# Patient Record
Sex: FEMALE | URBAN - METROPOLITAN AREA
[De-identification: names, ages, dates, MRNs, and addresses within clinical notes are randomized per-mention and may not be internally consistent; named-entity substitution may affect disease eponyms.]

---

## 2022-05-10 ENCOUNTER — APPOINTMENT (OUTPATIENT)
Dept: GENERAL RADIOLOGY | Age: 62
End: 2022-05-10
Attending: EMERGENCY MEDICINE
Payer: MEDICARE

## 2022-05-10 ENCOUNTER — HOSPITAL ENCOUNTER (EMERGENCY)
Age: 62
Discharge: HOME | End: 2022-05-11
Attending: EMERGENCY MEDICINE
Payer: MEDICARE

## 2022-05-10 DIAGNOSIS — M25.562 CHRONIC PAIN OF BOTH KNEES: Primary | ICD-10-CM

## 2022-05-10 DIAGNOSIS — D64.9 ANEMIA, UNSPECIFIED TYPE: ICD-10-CM

## 2022-05-10 DIAGNOSIS — M25.561 CHRONIC PAIN OF BOTH KNEES: Primary | ICD-10-CM

## 2022-05-10 DIAGNOSIS — G89.29 CHRONIC PAIN OF BOTH KNEES: Primary | ICD-10-CM

## 2022-05-10 LAB
ALBUMIN SERPL-MCNC: 3 G/DL (ref 3.5–5)
ALBUMIN/GLOB SERPL: 0.6 {RATIO} (ref 1.1–2.2)
ALP SERPL-CCNC: 77 U/L (ref 45–117)
ALT SERPL-CCNC: ABNORMAL U/L (ref 12–78)
ANION GAP SERPL CALC-SCNC: 2 MMOL/L (ref 5–15)
ANION GAP SERPL CALC-SCNC: 8 MMOL/L (ref 5–15)
AST SERPL-CCNC: ABNORMAL U/L (ref 15–37)
BASOPHILS # BLD: 0.1 K/UL (ref 0–0.1)
BASOPHILS NFR BLD: 1 % (ref 0–1)
BILIRUB SERPL-MCNC: 0.5 MG/DL (ref 0.2–1)
BUN SERPL-MCNC: 13 MG/DL (ref 6–20)
BUN SERPL-MCNC: 14 MG/DL (ref 6–20)
BUN/CREAT SERPL: 17 (ref 12–20)
BUN/CREAT SERPL: 19 (ref 12–20)
CALCIUM SERPL-MCNC: 8.3 MG/DL (ref 8.5–10.1)
CALCIUM SERPL-MCNC: 9 MG/DL (ref 8.5–10.1)
CHLORIDE SERPL-SCNC: 103 MMOL/L (ref 97–108)
CHLORIDE SERPL-SCNC: 105 MMOL/L (ref 97–108)
CO2 SERPL-SCNC: 23 MMOL/L (ref 21–32)
CO2 SERPL-SCNC: 24 MMOL/L (ref 21–32)
CREAT SERPL-MCNC: 0.75 MG/DL (ref 0.55–1.02)
CREAT SERPL-MCNC: 0.78 MG/DL (ref 0.55–1.02)
DIFFERENTIAL METHOD BLD: ABNORMAL
EOSINOPHIL # BLD: 0 K/UL (ref 0–0.4)
EOSINOPHIL NFR BLD: 0 % (ref 0–7)
ERYTHROCYTE [DISTWIDTH] IN BLOOD BY AUTOMATED COUNT: 16.4 % (ref 11.5–14.5)
GLOBULIN SER CALC-MCNC: 5 G/DL (ref 2–4)
GLUCOSE SERPL-MCNC: 79 MG/DL (ref 65–100)
GLUCOSE SERPL-MCNC: 88 MG/DL (ref 65–100)
HCT VFR BLD AUTO: 33.7 % (ref 35–47)
HGB BLD-MCNC: 10.3 G/DL (ref 11.5–16)
IMM GRANULOCYTES # BLD AUTO: 0.1 K/UL (ref 0–0.04)
IMM GRANULOCYTES NFR BLD AUTO: 1 % (ref 0–0.5)
LYMPHOCYTES # BLD: 2.1 K/UL (ref 0.8–3.5)
LYMPHOCYTES NFR BLD: 13 % (ref 12–49)
MCH RBC QN AUTO: 27.8 PG (ref 26–34)
MCHC RBC AUTO-ENTMCNC: 30.6 G/DL (ref 30–36.5)
MCV RBC AUTO: 90.8 FL (ref 80–99)
MONOCYTES # BLD: 1.2 K/UL (ref 0–1)
MONOCYTES NFR BLD: 7 % (ref 5–13)
NEUTS SEG # BLD: 12.7 K/UL (ref 1.8–8)
NEUTS SEG NFR BLD: 78 % (ref 32–75)
NRBC # BLD: 0 K/UL (ref 0–0.01)
NRBC BLD-RTO: 0 PER 100 WBC
PLATELET # BLD AUTO: 582 K/UL (ref 150–400)
PMV BLD AUTO: 9.1 FL (ref 8.9–12.9)
POTASSIUM SERPL-SCNC: 3.4 MMOL/L (ref 3.5–5.1)
POTASSIUM SERPL-SCNC: ABNORMAL MMOL/L (ref 3.5–5.1)
PROT SERPL-MCNC: 8 G/DL (ref 6.4–8.2)
RBC # BLD AUTO: 3.71 M/UL (ref 3.8–5.2)
SODIUM SERPL-SCNC: 129 MMOL/L (ref 136–145)
SODIUM SERPL-SCNC: 136 MMOL/L (ref 136–145)
UR CULT HOLD, URHOLD: NORMAL
WBC # BLD AUTO: 16.2 K/UL (ref 3.6–11)

## 2022-05-10 PROCEDURE — 99284 EMERGENCY DEPT VISIT MOD MDM: CPT

## 2022-05-10 PROCEDURE — 80053 COMPREHEN METABOLIC PANEL: CPT

## 2022-05-10 PROCEDURE — 74011250636 HC RX REV CODE- 250/636: Performed by: EMERGENCY MEDICINE

## 2022-05-10 PROCEDURE — 96374 THER/PROPH/DIAG INJ IV PUSH: CPT

## 2022-05-10 PROCEDURE — 73562 X-RAY EXAM OF KNEE 3: CPT

## 2022-05-10 PROCEDURE — 85025 COMPLETE CBC W/AUTO DIFF WBC: CPT

## 2022-05-10 PROCEDURE — 96372 THER/PROPH/DIAG INJ SC/IM: CPT

## 2022-05-10 PROCEDURE — 36415 COLL VENOUS BLD VENIPUNCTURE: CPT

## 2022-05-10 PROCEDURE — 81001 URINALYSIS AUTO W/SCOPE: CPT

## 2022-05-10 RX ORDER — KETOROLAC TROMETHAMINE 30 MG/ML
30 INJECTION, SOLUTION INTRAMUSCULAR; INTRAVENOUS ONCE
Status: COMPLETED | OUTPATIENT
Start: 2022-05-10 | End: 2022-05-10

## 2022-05-10 RX ORDER — ONDANSETRON 2 MG/ML
4 INJECTION INTRAMUSCULAR; INTRAVENOUS ONCE
Status: COMPLETED | OUTPATIENT
Start: 2022-05-10 | End: 2022-05-10

## 2022-05-10 RX ADMIN — KETOROLAC TROMETHAMINE 30 MG: 30 INJECTION, SOLUTION INTRAMUSCULAR at 23:03

## 2022-05-10 RX ADMIN — SODIUM CHLORIDE 1000 ML: 9 INJECTION, SOLUTION INTRAVENOUS at 23:36

## 2022-05-10 RX ADMIN — ONDANSETRON HYDROCHLORIDE 4 MG: 2 SOLUTION INTRAMUSCULAR; INTRAVENOUS at 23:36

## 2022-05-10 NOTE — ED TRIAGE NOTES
Triage: Pt arrives from home reporting bilateral knee dislocation, vomiting and need a blood transfusion that she is schedule to get on Monday. She cannot tell me the onset of her symptoms.

## 2022-05-11 VITALS
SYSTOLIC BLOOD PRESSURE: 118 MMHG | RESPIRATION RATE: 16 BRPM | WEIGHT: 132 LBS | HEIGHT: 63 IN | OXYGEN SATURATION: 98 % | TEMPERATURE: 98.4 F | BODY MASS INDEX: 23.39 KG/M2 | HEART RATE: 86 BPM | DIASTOLIC BLOOD PRESSURE: 74 MMHG

## 2022-05-11 LAB
APPEARANCE UR: CLEAR
BACTERIA URNS QL MICRO: NEGATIVE /HPF
BILIRUB UR QL: NEGATIVE
COLOR UR: ABNORMAL
EPITH CASTS URNS QL MICRO: ABNORMAL /LPF
GLUCOSE UR STRIP.AUTO-MCNC: NEGATIVE MG/DL
HGB UR QL STRIP: NEGATIVE
HYALINE CASTS URNS QL MICRO: ABNORMAL /LPF (ref 0–5)
KETONES UR QL STRIP.AUTO: ABNORMAL MG/DL
LEUKOCYTE ESTERASE UR QL STRIP.AUTO: ABNORMAL
NITRITE UR QL STRIP.AUTO: NEGATIVE
PH UR STRIP: 6 [PH] (ref 5–8)
PROT UR STRIP-MCNC: NEGATIVE MG/DL
RBC #/AREA URNS HPF: ABNORMAL /HPF (ref 0–5)
SP GR UR REFRACTOMETRY: >1.03
UROBILINOGEN UR QL STRIP.AUTO: 0.2 EU/DL (ref 0.2–1)
WBC URNS QL MICRO: ABNORMAL /HPF (ref 0–4)

## 2022-05-11 NOTE — ED NOTES
Bedside and Verbal shift change report given to Ruel Schulte RN (oncoming nurse) by Lyn Steward RN (offgoing nurse). Report included the following information SBAR, Kardex, ED Summary, Intake/Output, MAR, Recent Results and Med Rec Status.

## 2022-05-11 NOTE — ED PROVIDER NOTES
27-year-old female with past medical history significant for chronic bilateral knee pain, anemia presents with complaints of bilateral knee pain and concern for anemia with increasing fatigue. Patient reports she was unable to get her iron infusion at her last appointment at the infusion center because they could not get IV access. Denies syncope. Denies any new new trauma. Patient reports she is scheduled for a bilateral knee replacement with her orthopedist in [place]. Denies recent illness, fever, chills, nausea, vomiting, chest pain, shortness of breath. Friend concerned about her energy level. Denies urinary complaints. Denies tobacco use, drug use, alcohol use           No past medical history on file. No past surgical history on file. No family history on file. Social History     Socioeconomic History    Marital status: UNKNOWN     Spouse name: Not on file    Number of children: Not on file    Years of education: Not on file    Highest education level: Not on file   Occupational History    Not on file   Tobacco Use    Smoking status: Not on file    Smokeless tobacco: Not on file   Substance and Sexual Activity    Alcohol use: Not on file    Drug use: Not on file    Sexual activity: Not on file   Other Topics Concern    Not on file   Social History Narrative    Not on file     Social Determinants of Health     Financial Resource Strain:     Difficulty of Paying Living Expenses: Not on file   Food Insecurity:     Worried About Running Out of Food in the Last Year: Not on file    Se of Food in the Last Year: Not on file   Transportation Needs:     Lack of Transportation (Medical): Not on file    Lack of Transportation (Non-Medical):  Not on file   Physical Activity:     Days of Exercise per Week: Not on file    Minutes of Exercise per Session: Not on file   Stress:     Feeling of Stress : Not on file   Social Connections:     Frequency of Communication with Friends and Family: Not on file    Frequency of Social Gatherings with Friends and Family: Not on file    Attends Latter-day Services: Not on file    Active Member of Clubs or Organizations: Not on file    Attends Club or Organization Meetings: Not on file    Marital Status: Not on file   Intimate Partner Violence:     Fear of Current or Ex-Partner: Not on file    Emotionally Abused: Not on file    Physically Abused: Not on file    Sexually Abused: Not on file   Housing Stability:     Unable to Pay for Housing in the Last Year: Not on file    Number of [place] in the Last Year: Not on file    Unstable Housing in the Last Year: Not on file         ALLERGIES: Haldol [haloperidol lactate], Risperidone, and Clarithromycin    Review of Systems   Constitutional: Positive for fatigue. Negative for chills and fever. Respiratory: Negative for cough and shortness of breath. Cardiovascular: Negative for chest pain. Gastrointestinal: Negative for abdominal pain, nausea and vomiting. Genitourinary: Negative for dysuria and urgency. Musculoskeletal: Positive for arthralgias. Skin: Negative for wound. Neurological: Negative for seizures and headaches. Vitals:    05/10/22 1650   BP: 118/72   Pulse: 90   Resp: 16   Temp: 98 °F (36.7 °C)   SpO2: 98%            Physical Exam  Constitutional:       Appearance: She is well-developed. HENT:      Head: Normocephalic and atraumatic. Eyes:      Comments: Pale conjunctiva   Cardiovascular:      Rate and Rhythm: Normal rate. Pulmonary:      Effort: Pulmonary effort is normal. No respiratory distress. Musculoskeletal:         General: Normal range of motion. Cervical back: Normal range of motion. Comments: Ambulatory emergency room without difficulty, no bony step-offs or crepitus. Skin:     General: Skin is warm and dry. Neurological:      Mental Status: She is alert and oriented to person, place, and time.           [facility]  Number of Diagnoses or Management Options  Anemia, unspecified type  Chronic pain of both knees  Diagnosis management comments: 81-year-old female with chronic bilateral knee pain/arthritis, chronic anemia presents with complaints of bilateral knee pain and fatigue with anemia. Patient is afebrile, nontoxic, hemodynamically stable, afebrile, no respiratory stress, clear to auscultation bilaterally, normal oxygen saturation, abdomen soft/nontender/nondistended, ambulatory movement without difficulty. Plan bilateral knee x-ray, CBC/CMP. Knee x-ray showed no acute abnormality degenerative disease  Hemoglobin stable at 10.3 elevated WBC was noted at 16.2. Patient denies any recent illness, fever, chills, urinary complaints, cough, URI complaints. Advised to follow-up with primary care physician. Amount and/or Complexity of Data Reviewed  Clinical lab tests: ordered and reviewed  Tests in the radiology section of CPT®: ordered and reviewed    Patient Progress  Patient progress: stable         Procedures  11:55 PM  Patient's results have been reviewed with them. Patient and/or family have verbally conveyed their understanding and agreement of the patient's signs, symptoms, diagnosis, treatment and prognosis and additionally agree to follow up as recommended or return to the Emergency Room should their condition change prior to follow-up. Discharge instructions have also been provided to the patient with some educational information regarding their diagnosis as well a list of reasons why they would want to return to the ER prior to their follow-up appointment should their condition change. \

## 2022-05-16 ENCOUNTER — APPOINTMENT (OUTPATIENT)
Dept: GENERAL RADIOLOGY | Age: 62
DRG: 368 | End: 2022-05-16
Attending: EMERGENCY MEDICINE
Payer: MEDICARE

## 2022-05-16 ENCOUNTER — OFFICE VISIT (OUTPATIENT)
Dept: ONCOLOGY | Age: 62
End: 2022-05-16
Payer: MEDICARE

## 2022-05-16 ENCOUNTER — HOSPITAL ENCOUNTER (INPATIENT)
Age: 62
LOS: 12 days | Discharge: NURSING, REHAB OR LONG-TERM CARE | DRG: 368 | End: 2022-05-28
Attending: EMERGENCY MEDICINE | Admitting: HOSPITALIST
Payer: MEDICARE

## 2022-05-16 ENCOUNTER — APPOINTMENT (OUTPATIENT)
Dept: CT IMAGING | Age: 62
DRG: 368 | End: 2022-05-16
Attending: EMERGENCY MEDICINE
Payer: MEDICARE

## 2022-05-16 VITALS
HEART RATE: 83 BPM | DIASTOLIC BLOOD PRESSURE: 64 MMHG | HEIGHT: 63 IN | RESPIRATION RATE: 16 BRPM | BODY MASS INDEX: 21.76 KG/M2 | OXYGEN SATURATION: 97 % | SYSTOLIC BLOOD PRESSURE: 105 MMHG | TEMPERATURE: 98.1 F | WEIGHT: 122.8 LBS

## 2022-05-16 DIAGNOSIS — M25.562 CHRONIC PAIN OF BOTH KNEES: Primary | ICD-10-CM

## 2022-05-16 DIAGNOSIS — I95.89 HYPOTENSION DUE TO HYPOVOLEMIA: ICD-10-CM

## 2022-05-16 DIAGNOSIS — D50.0 IRON DEFICIENCY ANEMIA DUE TO CHRONIC BLOOD LOSS: Primary | ICD-10-CM

## 2022-05-16 DIAGNOSIS — M25.561 CHRONIC PAIN OF BOTH KNEES: Primary | ICD-10-CM

## 2022-05-16 DIAGNOSIS — Z51.5 PALLIATIVE CARE BY SPECIALIST: ICD-10-CM

## 2022-05-16 DIAGNOSIS — F19.90 SUBSTANCE USE DISORDER: ICD-10-CM

## 2022-05-16 DIAGNOSIS — G89.29 CHRONIC PAIN OF BOTH KNEES: Primary | ICD-10-CM

## 2022-05-16 DIAGNOSIS — K92.2 UPPER GI BLEED: ICD-10-CM

## 2022-05-16 DIAGNOSIS — E86.1 HYPOTENSION DUE TO HYPOVOLEMIA: ICD-10-CM

## 2022-05-16 PROBLEM — D50.9 IRON DEFICIENCY ANEMIA: Status: ACTIVE | Noted: 2022-05-16

## 2022-05-16 LAB
ALBUMIN SERPL-MCNC: 3 G/DL (ref 3.5–5)
ALBUMIN/GLOB SERPL: 0.7 {RATIO} (ref 1.1–2.2)
ALP SERPL-CCNC: 77 U/L (ref 45–117)
ALT SERPL-CCNC: 18 U/L (ref 12–78)
ANION GAP SERPL CALC-SCNC: 10 MMOL/L (ref 5–15)
APPEARANCE UR: CLEAR
AST SERPL-CCNC: 16 U/L (ref 15–37)
BACTERIA URNS QL MICRO: NEGATIVE /HPF
BASE DEFICIT BLD-SCNC: 2.7 MMOL/L
BASOPHILS # BLD: 0 K/UL (ref 0–0.1)
BASOPHILS NFR BLD: 0 % (ref 0–1)
BILIRUB SERPL-MCNC: 0.2 MG/DL (ref 0.2–1)
BILIRUB UR QL: NEGATIVE
BNP SERPL-MCNC: 111 PG/ML
BUN SERPL-MCNC: 19 MG/DL (ref 6–20)
BUN/CREAT SERPL: 20 (ref 12–20)
CA-I BLD-MCNC: 1.15 MMOL/L (ref 1.12–1.32)
CALCIUM SERPL-MCNC: 9.1 MG/DL (ref 8.5–10.1)
CHLORIDE BLD-SCNC: 100 MMOL/L (ref 100–108)
CHLORIDE SERPL-SCNC: 101 MMOL/L (ref 97–108)
CO2 BLD-SCNC: 21 MMOL/L (ref 19–24)
CO2 SERPL-SCNC: 22 MMOL/L (ref 21–32)
COLOR UR: ABNORMAL
CREAT SERPL-MCNC: 0.96 MG/DL (ref 0.55–1.02)
CREAT UR-MCNC: 0.8 MG/DL (ref 0.6–1.3)
DIFFERENTIAL METHOD BLD: ABNORMAL
EOSINOPHIL # BLD: 0.1 K/UL (ref 0–0.4)
EOSINOPHIL NFR BLD: 1 % (ref 0–7)
EPITH CASTS URNS QL MICRO: ABNORMAL /LPF
ERYTHROCYTE [DISTWIDTH] IN BLOOD BY AUTOMATED COUNT: 16.1 % (ref 11.5–14.5)
ERYTHROCYTE [DISTWIDTH] IN BLOOD BY AUTOMATED COUNT: 16.2 % (ref 11.5–14.5)
GLOBULIN SER CALC-MCNC: 4.2 G/DL (ref 2–4)
GLUCOSE BLD STRIP.AUTO-MCNC: 80 MG/DL (ref 74–106)
GLUCOSE SERPL-MCNC: 101 MG/DL (ref 65–100)
GLUCOSE UR STRIP.AUTO-MCNC: NEGATIVE MG/DL
HCO3 BLDA-SCNC: 20 MMOL/L
HCT VFR BLD AUTO: 21.6 % (ref 35–47)
HCT VFR BLD AUTO: 27.1 % (ref 35–47)
HGB BLD-MCNC: 6.5 G/DL (ref 11.5–16)
HGB BLD-MCNC: 8.3 G/DL (ref 11.5–16)
HGB UR QL STRIP: NEGATIVE
IMM GRANULOCYTES # BLD AUTO: 0.1 K/UL (ref 0–0.04)
IMM GRANULOCYTES NFR BLD AUTO: 1 % (ref 0–0.5)
KETONES UR QL STRIP.AUTO: NEGATIVE MG/DL
LACTATE BLD-SCNC: 1.1 MMOL/L (ref 0.4–2)
LEUKOCYTE ESTERASE UR QL STRIP.AUTO: ABNORMAL
LIPASE SERPL-CCNC: 109 U/L (ref 73–393)
LYMPHOCYTES # BLD: 2.4 K/UL (ref 0.8–3.5)
LYMPHOCYTES NFR BLD: 19 % (ref 12–49)
MCH RBC QN AUTO: 27.2 PG (ref 26–34)
MCH RBC QN AUTO: 27.3 PG (ref 26–34)
MCHC RBC AUTO-ENTMCNC: 30.1 G/DL (ref 30–36.5)
MCHC RBC AUTO-ENTMCNC: 30.6 G/DL (ref 30–36.5)
MCV RBC AUTO: 89.1 FL (ref 80–99)
MCV RBC AUTO: 90.4 FL (ref 80–99)
MONOCYTES # BLD: 0.9 K/UL (ref 0–1)
MONOCYTES NFR BLD: 7 % (ref 5–13)
NEUTS SEG # BLD: 9.1 K/UL (ref 1.8–8)
NEUTS SEG NFR BLD: 72 % (ref 32–75)
NITRITE UR QL STRIP.AUTO: NEGATIVE
NRBC # BLD: 0 K/UL (ref 0–0.01)
NRBC # BLD: 0 K/UL (ref 0–0.01)
NRBC BLD-RTO: 0 PER 100 WBC
NRBC BLD-RTO: 0 PER 100 WBC
OTHER,OTHU: ABNORMAL
PCO2 BLDV: 28 MMHG (ref 41–51)
PH BLDV: 7.47 [PH] (ref 7.32–7.42)
PH UR STRIP: 5 [PH] (ref 5–8)
PLATELET # BLD AUTO: 506 K/UL (ref 150–400)
PLATELET # BLD AUTO: 734 K/UL (ref 150–400)
PMV BLD AUTO: 8.3 FL (ref 8.9–12.9)
PMV BLD AUTO: 8.5 FL (ref 8.9–12.9)
PO2 BLDV: 35 MMHG (ref 25–40)
POTASSIUM BLD-SCNC: 3.3 MMOL/L (ref 3.5–5.5)
POTASSIUM SERPL-SCNC: 3.3 MMOL/L (ref 3.5–5.1)
PROT SERPL-MCNC: 7.2 G/DL (ref 6.4–8.2)
PROT UR STRIP-MCNC: NEGATIVE MG/DL
RBC # BLD AUTO: 2.39 M/UL (ref 3.8–5.2)
RBC # BLD AUTO: 3.04 M/UL (ref 3.8–5.2)
RBC #/AREA URNS HPF: ABNORMAL /HPF (ref 0–5)
RBC MORPH BLD: ABNORMAL
SODIUM BLD-SCNC: 136 MMOL/L (ref 136–145)
SODIUM SERPL-SCNC: 133 MMOL/L (ref 136–145)
SPECIMEN SITE: ABNORMAL
TROPONIN-HIGH SENSITIVITY: 8 NG/L (ref 0–51)
UA: UC IF INDICATED,UAUC: ABNORMAL
UROBILINOGEN UR QL STRIP.AUTO: 0.2 EU/DL (ref 0.2–1)
WBC # BLD AUTO: 12.6 K/UL (ref 3.6–11)
WBC # BLD AUTO: 8 K/UL (ref 3.6–11)
WBC URNS QL MICRO: ABNORMAL /HPF (ref 0–4)

## 2022-05-16 PROCEDURE — 99203 OFFICE O/P NEW LOW 30 MIN: CPT | Performed by: INTERNAL MEDICINE

## 2022-05-16 PROCEDURE — 74011000250 HC RX REV CODE- 250: Performed by: EMERGENCY MEDICINE

## 2022-05-16 PROCEDURE — 80053 COMPREHEN METABOLIC PANEL: CPT

## 2022-05-16 PROCEDURE — 71045 X-RAY EXAM CHEST 1 VIEW: CPT

## 2022-05-16 PROCEDURE — 93005 ELECTROCARDIOGRAM TRACING: CPT

## 2022-05-16 PROCEDURE — 83690 ASSAY OF LIPASE: CPT

## 2022-05-16 PROCEDURE — G8432 DEP SCR NOT DOC, RNG: HCPCS | Performed by: INTERNAL MEDICINE

## 2022-05-16 PROCEDURE — 84484 ASSAY OF TROPONIN QUANT: CPT

## 2022-05-16 PROCEDURE — 83880 ASSAY OF NATRIURETIC PEPTIDE: CPT

## 2022-05-16 PROCEDURE — 96375 TX/PRO/DX INJ NEW DRUG ADDON: CPT

## 2022-05-16 PROCEDURE — 74011250636 HC RX REV CODE- 250/636: Performed by: EMERGENCY MEDICINE

## 2022-05-16 PROCEDURE — 94761 N-INVAS EAR/PLS OXIMETRY MLT: CPT

## 2022-05-16 PROCEDURE — 81001 URINALYSIS AUTO W/SCOPE: CPT

## 2022-05-16 PROCEDURE — 82947 ASSAY GLUCOSE BLOOD QUANT: CPT

## 2022-05-16 PROCEDURE — 82533 TOTAL CORTISOL: CPT

## 2022-05-16 PROCEDURE — 3017F COLORECTAL CA SCREEN DOC REV: CPT | Performed by: INTERNAL MEDICINE

## 2022-05-16 PROCEDURE — 84443 ASSAY THYROID STIM HORMONE: CPT

## 2022-05-16 PROCEDURE — 86900 BLOOD TYPING SEROLOGIC ABO: CPT

## 2022-05-16 PROCEDURE — 96361 HYDRATE IV INFUSION ADD-ON: CPT

## 2022-05-16 PROCEDURE — 36415 COLL VENOUS BLD VENIPUNCTURE: CPT

## 2022-05-16 PROCEDURE — 85025 COMPLETE CBC W/AUTO DIFF WBC: CPT

## 2022-05-16 PROCEDURE — 70450 CT HEAD/BRAIN W/O DYE: CPT

## 2022-05-16 PROCEDURE — 74011250636 HC RX REV CODE- 250/636: Performed by: NURSE PRACTITIONER

## 2022-05-16 PROCEDURE — 74011250637 HC RX REV CODE- 250/637: Performed by: EMERGENCY MEDICINE

## 2022-05-16 PROCEDURE — 99285 EMERGENCY DEPT VISIT HI MDM: CPT

## 2022-05-16 PROCEDURE — C9113 INJ PANTOPRAZOLE SODIUM, VIA: HCPCS | Performed by: EMERGENCY MEDICINE

## 2022-05-16 PROCEDURE — 65610000006 HC RM INTENSIVE CARE

## 2022-05-16 PROCEDURE — G8427 DOCREV CUR MEDS BY ELIG CLIN: HCPCS | Performed by: INTERNAL MEDICINE

## 2022-05-16 PROCEDURE — 84145 PROCALCITONIN (PCT): CPT

## 2022-05-16 PROCEDURE — G0463 HOSPITAL OUTPT CLINIC VISIT: HCPCS | Performed by: INTERNAL MEDICINE

## 2022-05-16 PROCEDURE — G8420 CALC BMI NORM PARAMETERS: HCPCS | Performed by: INTERNAL MEDICINE

## 2022-05-16 PROCEDURE — 96374 THER/PROPH/DIAG INJ IV PUSH: CPT

## 2022-05-16 PROCEDURE — 85027 COMPLETE CBC AUTOMATED: CPT

## 2022-05-16 RX ORDER — SODIUM CHLORIDE 0.9 % (FLUSH) 0.9 %
5-40 SYRINGE (ML) INJECTION EVERY 8 HOURS
Status: DISCONTINUED | OUTPATIENT
Start: 2022-05-16 | End: 2022-05-28 | Stop reason: HOSPADM

## 2022-05-16 RX ORDER — ACETAMINOPHEN 500 MG
1000 TABLET ORAL
Status: DISCONTINUED | OUTPATIENT
Start: 2022-05-16 | End: 2022-05-18

## 2022-05-16 RX ORDER — BUDESONIDE 0.25 MG/2ML
250 INHALANT ORAL
Status: DISCONTINUED | OUTPATIENT
Start: 2022-05-17 | End: 2022-05-28 | Stop reason: HOSPADM

## 2022-05-16 RX ORDER — SODIUM CHLORIDE, SODIUM LACTATE, POTASSIUM CHLORIDE, CALCIUM CHLORIDE 600; 310; 30; 20 MG/100ML; MG/100ML; MG/100ML; MG/100ML
100 INJECTION, SOLUTION INTRAVENOUS CONTINUOUS
Status: DISPENSED | OUTPATIENT
Start: 2022-05-16 | End: 2022-05-17

## 2022-05-16 RX ORDER — SODIUM CHLORIDE 0.9 % (FLUSH) 0.9 %
5-40 SYRINGE (ML) INJECTION AS NEEDED
Status: DISCONTINUED | OUTPATIENT
Start: 2022-05-16 | End: 2022-05-28 | Stop reason: HOSPADM

## 2022-05-16 RX ORDER — SODIUM CHLORIDE 9 MG/ML
250 INJECTION, SOLUTION INTRAVENOUS AS NEEDED
Status: DISCONTINUED | OUTPATIENT
Start: 2022-05-16 | End: 2022-05-28 | Stop reason: HOSPADM

## 2022-05-16 RX ORDER — ONDANSETRON 2 MG/ML
4 INJECTION INTRAMUSCULAR; INTRAVENOUS
Status: DISCONTINUED | OUTPATIENT
Start: 2022-05-16 | End: 2022-05-28 | Stop reason: HOSPADM

## 2022-05-16 RX ORDER — KETOROLAC TROMETHAMINE 30 MG/ML
15 INJECTION, SOLUTION INTRAMUSCULAR; INTRAVENOUS
Status: COMPLETED | OUTPATIENT
Start: 2022-05-16 | End: 2022-05-16

## 2022-05-16 RX ORDER — LEVOTHYROXINE SODIUM 150 UG/1
150 TABLET ORAL
Status: DISCONTINUED | OUTPATIENT
Start: 2022-05-17 | End: 2022-05-18

## 2022-05-16 RX ORDER — LORAZEPAM 0.5 MG/1
0.5 TABLET ORAL
Status: COMPLETED | OUTPATIENT
Start: 2022-05-16 | End: 2022-05-16

## 2022-05-16 RX ORDER — IPRATROPIUM BROMIDE AND ALBUTEROL SULFATE 2.5; .5 MG/3ML; MG/3ML
3 SOLUTION RESPIRATORY (INHALATION)
Status: DISCONTINUED | OUTPATIENT
Start: 2022-05-17 | End: 2022-05-18

## 2022-05-16 RX ORDER — ONDANSETRON 2 MG/ML
4 INJECTION INTRAMUSCULAR; INTRAVENOUS
Status: COMPLETED | OUTPATIENT
Start: 2022-05-16 | End: 2022-05-16

## 2022-05-16 RX ORDER — POTASSIUM CHLORIDE 7.45 MG/ML
10 INJECTION INTRAVENOUS
Status: DISPENSED | OUTPATIENT
Start: 2022-05-16 | End: 2022-05-17

## 2022-05-16 RX ORDER — ONDANSETRON 4 MG/1
4 TABLET, ORALLY DISINTEGRATING ORAL
Status: DISCONTINUED | OUTPATIENT
Start: 2022-05-16 | End: 2022-05-28 | Stop reason: HOSPADM

## 2022-05-16 RX ADMIN — SODIUM CHLORIDE 1000 ML: 9 INJECTION, SOLUTION INTRAVENOUS at 19:50

## 2022-05-16 RX ADMIN — SODIUM CHLORIDE 1000 ML: 9 INJECTION, SOLUTION INTRAVENOUS at 19:08

## 2022-05-16 RX ADMIN — LORAZEPAM 0.5 MG: 0.5 TABLET ORAL at 22:08

## 2022-05-16 RX ADMIN — SODIUM CHLORIDE 80 MG: 9 INJECTION, SOLUTION INTRAMUSCULAR; INTRAVENOUS; SUBCUTANEOUS at 19:00

## 2022-05-16 RX ADMIN — POTASSIUM CHLORIDE 10 MEQ: 7.46 INJECTION, SOLUTION INTRAVENOUS at 23:43

## 2022-05-16 RX ADMIN — KETOROLAC TROMETHAMINE 15 MG: 30 INJECTION, SOLUTION INTRAMUSCULAR at 18:58

## 2022-05-16 RX ADMIN — KETOROLAC TROMETHAMINE 15 MG: 30 INJECTION, SOLUTION INTRAMUSCULAR at 18:59

## 2022-05-16 RX ADMIN — SODIUM CHLORIDE, POTASSIUM CHLORIDE, SODIUM LACTATE AND CALCIUM CHLORIDE 100 ML/HR: 600; 310; 30; 20 INJECTION, SOLUTION INTRAVENOUS at 23:42

## 2022-05-16 RX ADMIN — ONDANSETRON 4 MG: 2 INJECTION INTRAMUSCULAR; INTRAVENOUS at 18:59

## 2022-05-16 NOTE — PROGRESS NOTES
[address] [facility] [address], [address] [facility] [place], [address] [place] [address]  [phone]    Hematology Note        Patient: Pamela Hancock MRN: [id number]  SSN: [id number]    YOB: 1960  Age: 62 y.o. Sex: female      Subjective:      Pamela Hancock is a 62 y.o. female who I am seeing for iron deficiency anemia. She has suffered with iron deficiency anemia for several yrs. She receives IV iron in [place] where she lives. She is visiting family members in [place]. She feels poorly and is laying on the exam table. She is poor historian. Review of Systems:    Constitutional: positive for fatigue  Eyes: negative  Ears, Nose, Mouth, Throat, and Face: negative  Respiratory: negative  Cardiovascular: negative  Gastrointestinal: negative  Genitourinary:negative  Integument/Breast: negative  Hematologic/Lymphatic: negative  Musculoskeletal:negative  Neurological: negative      Past Medical History:   Diagnosis Date    Arthritis     Iron deficiency     Lupus ([address]     Osteoporosis     Sarcoidosis      Past Surgical History:   Procedure Laterality Date    HX HERNIA REPAIR  2013    needs a repat she says      History reviewed. No pertinent family history.   Social History     Tobacco Use    Smoking status: Former Smoker     Types: Cigarettes    Smokeless tobacco: Never Used   Substance Use Topics    Alcohol use: Never      Prior to Admission medications    Not on File            Allergies   Allergen Reactions    Haldol [Haloperidol Lactate] Anaphylaxis    Risperidone Anaphylaxis    Clarithromycin Other (comments)     Urticaria           Objective:     Vitals:    05/16/22 1544   BP: 105/64   Pulse: 83   Resp: 16   Temp: 98.1 °F (36.7 °C)   TempSrc: Oral   SpO2: 97%   Weight: 122 lb 12.8 oz (55.7 kg)   Height: 5' 3\" (1.6 m)            Physical Exam:    GENERAL: no distress, mild distress, appears older than stated age, she is poorly co-operative with exam  EYE: Could not examine since the patient is wearing dark glasses  LYMPHATIC: Cervical, supraclavicular, and axillary nodes normal.   THROAT & NECK: normal and no erythema or exudates noted. LUNG: clear to auscultation bilaterally  HEART: regular rate and rhythm, S1, S2 normal, no murmur, click, rub or gallop  ABDOMEN: soft, non-tender. Bowel sounds normal. No masses,  no organomegaly  EXTREMITIES: no edema  SKIN: Normal.  NEUROLOGIC: Alert. Detailed neurological exam was not performed        Lab Results   Component Value Date/Time    WBC 12.6 (H) 05/16/2022 04:57 PM    HGB 8.3 (L) 05/16/2022 04:57 PM    HCT 27.1 (L) 05/16/2022 04:57 PM    PLATELET 090 (H) 16/70/0025 04:57 PM    MCV 89.1 05/16/2022 04:57 PM           No results found for: IRON, FE, TIBC, IBCT, PSAT, FERR        Assessment:     1. Iron deficiency anemia secondary to chronic gastrointestinal bleeding:    Last Hg ~ 10  Patient is from [place]  No urgent need for IV iron      Plan:       1.  Follow up with your hematologist in [place]      Signed by: Elida Luciano MD                     May 16, 2022

## 2022-05-16 NOTE — PROGRESS NOTES
63 y/o female here for new appt for abnormal labs presents in a wheelchair. High platelets, high WBC, and low HGB. This is noted on her ER visit recently for knee pain. She was self referred here for JANE, PCP is in [place]. Pt thought she was going to be able to get iron today. She has difficulty swallowing. She has seen GI , Dr. Lena Abdullahi in [place]. Oral pills can not tolerate due to difficulty swallowing due to hernia. She is here on vacation from [place]. She has been here approx. 3-4 weeks. Pt was supposed to get iron transfusion back on April 20 before leaving [place] but could not get a line. Pt has had several iron infusions before. Venofer is the infusions she has had. She is not sure when last one was. Pt is slumped back in the chair, moaning and huffing, has pain, she has sarcoidosis and lupus. Which she usually takes toradol but doesn't have any. She is not supposed to take NSAIDS but she is to help with pain due to no toradol. Pt said she is nauseated and in pain. Last colonoscopy was April 8 but she doesn't remember the ear. Pt unable to remember her medications in extent. Pt is on xarelto but it is prophylatic she says. Pt was throwing up the other day which had blood in it. She is on protonix twice a day. Has not had it since been here in [place]. She is also on carafate but does not have it. She had a bleeding ulcer before down to 5. Last blood transfusion was in 2020. Pt was very flighty during the visit. Her cousin was present during the visit and seemed frustrated with the pt. She asked if she can get up on the table, I advised yes but she will need to get herself on the table if she needs to. She then did not want to leave the office. Tremayne Monreal NP spoke with pt and said if she is feeling so poorly she needs to go to the ED as we can not help her in the outpt setting.

## 2022-05-17 ENCOUNTER — ANESTHESIA (OUTPATIENT)
Dept: ENDOSCOPY | Age: 62
DRG: 368 | End: 2022-05-17
Payer: MEDICARE

## 2022-05-17 ENCOUNTER — ANESTHESIA EVENT (OUTPATIENT)
Dept: ENDOSCOPY | Age: 62
DRG: 368 | End: 2022-05-17
Payer: MEDICARE

## 2022-05-17 ENCOUNTER — APPOINTMENT (OUTPATIENT)
Dept: NON INVASIVE DIAGNOSTICS | Age: 62
DRG: 368 | End: 2022-05-17
Attending: NURSE PRACTITIONER
Payer: MEDICARE

## 2022-05-17 ENCOUNTER — APPOINTMENT (OUTPATIENT)
Dept: GENERAL RADIOLOGY | Age: 62
DRG: 368 | End: 2022-05-17
Attending: PHYSICIAN ASSISTANT
Payer: MEDICARE

## 2022-05-17 PROBLEM — K92.0 HEMATEMESIS: Status: ACTIVE | Noted: 2022-05-17

## 2022-05-17 LAB
ANION GAP SERPL CALC-SCNC: 6 MMOL/L (ref 5–15)
ATRIAL RATE: 59 BPM
BASOPHILS # BLD: 0.1 K/UL (ref 0–0.1)
BASOPHILS NFR BLD: 1 % (ref 0–1)
BUN SERPL-MCNC: 16 MG/DL (ref 6–20)
BUN/CREAT SERPL: 22 (ref 12–20)
CALCIUM SERPL-MCNC: 8 MG/DL (ref 8.5–10.1)
CALCULATED P AXIS, ECG09: 39 DEGREES
CALCULATED R AXIS, ECG10: 41 DEGREES
CALCULATED T AXIS, ECG11: 31 DEGREES
CHLORIDE SERPL-SCNC: 110 MMOL/L (ref 97–108)
CO2 SERPL-SCNC: 21 MMOL/L (ref 21–32)
CORTIS SERPL-MCNC: 3.9 UG/DL
CREAT SERPL-MCNC: 0.74 MG/DL (ref 0.55–1.02)
DIAGNOSIS, 93000: NORMAL
DIFFERENTIAL METHOD BLD: ABNORMAL
ECHO AO ROOT DIAM: 4 CM
ECHO AO ROOT INDEX: 2.53 CM/M2
ECHO AR MAX VEL PISA: 4.5 M/S
ECHO AV AREA PEAK VELOCITY: 2.2 CM2
ECHO AV AREA PEAK VELOCITY: 2.3 CM2
ECHO AV AREA VTI: 2.5 CM2
ECHO AV AREA/BSA VTI: 1.6 CM2/M2
ECHO AV MEAN GRADIENT: 6 MMHG
ECHO AV MEAN VELOCITY: 1.2 M/S
ECHO AV PEAK GRADIENT: 12 MMHG
ECHO AV PEAK GRADIENT: 13 MMHG
ECHO AV PEAK VELOCITY: 1.8 M/S
ECHO AV PEAK VELOCITY: 1.8 M/S
ECHO AV REGURGITANT PHT: 265.9 MILLISECOND
ECHO AV VTI: 33.3 CM
ECHO EST RA PRESSURE: 8 MMHG
ECHO LA DIAMETER INDEX: 2.09 CM/M2
ECHO LA DIAMETER: 3.3 CM
ECHO LA TO AORTIC ROOT RATIO: 0.83
ECHO LV E' LATERAL VELOCITY: 4 CM/S
ECHO LV E' SEPTAL VELOCITY: 6 CM/S
ECHO LV FRACTIONAL SHORTENING: 47 % (ref 28–44)
ECHO LV INTERNAL DIMENSION DIASTOLE INDEX: 2.85 CM/M2
ECHO LV INTERNAL DIMENSION DIASTOLIC: 4.5 CM (ref 3.9–5.3)
ECHO LV INTERNAL DIMENSION SYSTOLIC INDEX: 1.52 CM/M2
ECHO LV INTERNAL DIMENSION SYSTOLIC: 2.4 CM
ECHO LV IVSD: 1.6 CM (ref 0.6–0.9)
ECHO LV MASS 2D: 235.3 G (ref 67–162)
ECHO LV MASS INDEX 2D: 148.9 G/M2 (ref 43–95)
ECHO LV POSTERIOR WALL DIASTOLIC: 1.1 CM (ref 0.6–0.9)
ECHO LV RELATIVE WALL THICKNESS RATIO: 0.49
ECHO LVOT AREA: 3.8 CM2
ECHO LVOT AV VTI INDEX: 0.69
ECHO LVOT DIAM: 2.2 CM
ECHO LVOT MEAN GRADIENT: 3 MMHG
ECHO LVOT PEAK GRADIENT: 5 MMHG
ECHO LVOT PEAK GRADIENT: 5 MMHG
ECHO LVOT PEAK VELOCITY: 1.1 M/S
ECHO LVOT PEAK VELOCITY: 1.1 M/S
ECHO LVOT STROKE VOLUME INDEX: 55.3 ML/M2
ECHO LVOT SV: 87.4 ML
ECHO LVOT VTI: 23 CM
ECHO MV A VELOCITY: 1.14 M/S
ECHO MV E VELOCITY: 0.67 M/S
ECHO MV E/A RATIO: 0.59
ECHO MV E/E' LATERAL: 16.75
ECHO MV E/E' RATIO (AVERAGED): 13.96
ECHO MV E/E' SEPTAL: 11.17
ECHO MV REGURGITANT PEAK GRADIENT: 112 MMHG
ECHO MV REGURGITANT PEAK VELOCITY: 5.3 M/S
ECHO PV MAX VELOCITY: 1.1 M/S
ECHO PV PEAK GRADIENT: 5 MMHG
ECHO RIGHT VENTRICULAR SYSTOLIC PRESSURE (RVSP): 43 MMHG
ECHO RV FREE WALL PEAK S': 28 CM/S
ECHO TV REGURGITANT MAX VELOCITY: 2.94 M/S
ECHO TV REGURGITANT PEAK GRADIENT: 35 MMHG
EOSINOPHIL # BLD: 0.2 K/UL (ref 0–0.4)
EOSINOPHIL NFR BLD: 3 % (ref 0–7)
ERYTHROCYTE [DISTWIDTH] IN BLOOD BY AUTOMATED COUNT: 15.4 % (ref 11.5–14.5)
ERYTHROCYTE [DISTWIDTH] IN BLOOD BY AUTOMATED COUNT: 15.5 % (ref 11.5–14.5)
ERYTHROCYTE [DISTWIDTH] IN BLOOD BY AUTOMATED COUNT: 15.5 % (ref 11.5–14.5)
FERRITIN SERPL-MCNC: 15 NG/ML (ref 8–252)
GLUCOSE SERPL-MCNC: 71 MG/DL (ref 65–100)
HCT VFR BLD AUTO: 24.6 % (ref 35–47)
HCT VFR BLD AUTO: 25.1 % (ref 35–47)
HCT VFR BLD AUTO: 29.6 % (ref 35–47)
HGB BLD-MCNC: 7.4 G/DL (ref 11.5–16)
HGB BLD-MCNC: 7.8 G/DL (ref 11.5–16)
HGB BLD-MCNC: 9.1 G/DL (ref 11.5–16)
HISTORY CHECKED?,CKHIST: NORMAL
IMM GRANULOCYTES # BLD AUTO: 0.1 K/UL (ref 0–0.04)
IMM GRANULOCYTES NFR BLD AUTO: 1 % (ref 0–0.5)
INR PPP: 1 (ref 0.9–1.1)
LACTATE SERPL-SCNC: 1.5 MMOL/L (ref 0.4–2)
LYMPHOCYTES # BLD: 1.9 K/UL (ref 0.8–3.5)
LYMPHOCYTES NFR BLD: 25 % (ref 12–49)
MAGNESIUM SERPL-MCNC: 1.9 MG/DL (ref 1.6–2.4)
MCH RBC QN AUTO: 27.7 PG (ref 26–34)
MCH RBC QN AUTO: 28.2 PG (ref 26–34)
MCH RBC QN AUTO: 28.4 PG (ref 26–34)
MCHC RBC AUTO-ENTMCNC: 30.1 G/DL (ref 30–36.5)
MCHC RBC AUTO-ENTMCNC: 30.7 G/DL (ref 30–36.5)
MCHC RBC AUTO-ENTMCNC: 31.1 G/DL (ref 30–36.5)
MCV RBC AUTO: 90.2 FL (ref 80–99)
MCV RBC AUTO: 91.3 FL (ref 80–99)
MCV RBC AUTO: 93.9 FL (ref 80–99)
MONOCYTES # BLD: 0.6 K/UL (ref 0–1)
MONOCYTES NFR BLD: 8 % (ref 5–13)
NEUTS SEG # BLD: 4.7 K/UL (ref 1.8–8)
NEUTS SEG NFR BLD: 62 % (ref 32–75)
NRBC # BLD: 0 K/UL (ref 0–0.01)
NRBC BLD-RTO: 0 PER 100 WBC
P-R INTERVAL, ECG05: 118 MS
PHOSPHATE SERPL-MCNC: 2.9 MG/DL (ref 2.6–4.7)
PLATELET # BLD AUTO: 402 K/UL (ref 150–400)
PLATELET # BLD AUTO: 520 K/UL (ref 150–400)
PLATELET # BLD AUTO: 640 K/UL (ref 150–400)
PMV BLD AUTO: 8.1 FL (ref 8.9–12.9)
PMV BLD AUTO: 8.7 FL (ref 8.9–12.9)
PMV BLD AUTO: 8.7 FL (ref 8.9–12.9)
POTASSIUM SERPL-SCNC: 3.4 MMOL/L (ref 3.5–5.1)
PROCALCITONIN SERPL-MCNC: 0.21 NG/ML
PROTHROMBIN TIME: 10.6 SEC (ref 9–11.1)
Q-T INTERVAL, ECG07: 476 MS
QRS DURATION, ECG06: 90 MS
QTC CALCULATION (BEZET), ECG08: 471 MS
RBC # BLD AUTO: 2.62 M/UL (ref 3.8–5.2)
RBC # BLD AUTO: 2.75 M/UL (ref 3.8–5.2)
RBC # BLD AUTO: 3.28 M/UL (ref 3.8–5.2)
SODIUM SERPL-SCNC: 137 MMOL/L (ref 136–145)
T4 FREE SERPL-MCNC: 2.2 NG/DL (ref 0.8–1.5)
TSH SERPL DL<=0.05 MIU/L-ACNC: 0.09 UIU/ML (ref 0.36–3.74)
TSH SERPL DL<=0.05 MIU/L-ACNC: 0.1 UIU/ML (ref 0.36–3.74)
VENTRICULAR RATE, ECG03: 59 BPM
WBC # BLD AUTO: 7.5 K/UL (ref 3.6–11)
WBC # BLD AUTO: 7.5 K/UL (ref 3.6–11)
WBC # BLD AUTO: 7.7 K/UL (ref 3.6–11)

## 2022-05-17 PROCEDURE — 36415 COLL VENOUS BLD VENIPUNCTURE: CPT

## 2022-05-17 PROCEDURE — 93306 TTE W/DOPPLER COMPLETE: CPT | Performed by: INTERNAL MEDICINE

## 2022-05-17 PROCEDURE — 94640 AIRWAY INHALATION TREATMENT: CPT

## 2022-05-17 PROCEDURE — 74011000250 HC RX REV CODE- 250: Performed by: INTERNAL MEDICINE

## 2022-05-17 PROCEDURE — 85025 COMPLETE CBC W/AUTO DIFF WBC: CPT

## 2022-05-17 PROCEDURE — 84439 ASSAY OF FREE THYROXINE: CPT

## 2022-05-17 PROCEDURE — 85027 COMPLETE CBC AUTOMATED: CPT

## 2022-05-17 PROCEDURE — 76060000031 HC ANESTHESIA FIRST 0.5 HR: Performed by: SPECIALIST

## 2022-05-17 PROCEDURE — 94761 N-INVAS EAR/PLS OXIMETRY MLT: CPT

## 2022-05-17 PROCEDURE — 93306 TTE W/DOPPLER COMPLETE: CPT

## 2022-05-17 PROCEDURE — 77010033678 HC OXYGEN DAILY

## 2022-05-17 PROCEDURE — 74011250637 HC RX REV CODE- 250/637: Performed by: HOSPITALIST

## 2022-05-17 PROCEDURE — 0DJ08ZZ INSPECTION OF UPPER INTESTINAL TRACT, VIA NATURAL OR ARTIFICIAL OPENING ENDOSCOPIC: ICD-10-PCS | Performed by: SPECIALIST

## 2022-05-17 PROCEDURE — 85610 PROTHROMBIN TIME: CPT

## 2022-05-17 PROCEDURE — 74011250637 HC RX REV CODE- 250/637: Performed by: NURSE PRACTITIONER

## 2022-05-17 PROCEDURE — 74011250636 HC RX REV CODE- 250/636: Performed by: PHYSICIAN ASSISTANT

## 2022-05-17 PROCEDURE — 84443 ASSAY THYROID STIM HORMONE: CPT

## 2022-05-17 PROCEDURE — 65270000046 HC RM TELEMETRY

## 2022-05-17 PROCEDURE — 82728 ASSAY OF FERRITIN: CPT

## 2022-05-17 PROCEDURE — 76040000019: Performed by: SPECIALIST

## 2022-05-17 PROCEDURE — 74011250637 HC RX REV CODE- 250/637: Performed by: INTERNAL MEDICINE

## 2022-05-17 PROCEDURE — 74011250637 HC RX REV CODE- 250/637: Performed by: SPECIALIST

## 2022-05-17 PROCEDURE — 30233N1 TRANSFUSION OF NONAUTOLOGOUS RED BLOOD CELLS INTO PERIPHERAL VEIN, PERCUTANEOUS APPROACH: ICD-10-PCS | Performed by: HOSPITALIST

## 2022-05-17 PROCEDURE — 74011250636 HC RX REV CODE- 250/636: Performed by: SPECIALIST

## 2022-05-17 PROCEDURE — 83735 ASSAY OF MAGNESIUM: CPT

## 2022-05-17 PROCEDURE — 74011250636 HC RX REV CODE- 250/636: Performed by: NURSE PRACTITIONER

## 2022-05-17 PROCEDURE — 36430 TRANSFUSION BLD/BLD COMPNT: CPT

## 2022-05-17 PROCEDURE — 76937 US GUIDE VASCULAR ACCESS: CPT

## 2022-05-17 PROCEDURE — 94760 N-INVAS EAR/PLS OXIMETRY 1: CPT

## 2022-05-17 PROCEDURE — 74011000250 HC RX REV CODE- 250: Performed by: NURSE ANESTHETIST, CERTIFIED REGISTERED

## 2022-05-17 PROCEDURE — 74011250637 HC RX REV CODE- 250/637

## 2022-05-17 PROCEDURE — 74011250636 HC RX REV CODE- 250/636: Performed by: NURSE ANESTHETIST, CERTIFIED REGISTERED

## 2022-05-17 PROCEDURE — C9113 INJ PANTOPRAZOLE SODIUM, VIA: HCPCS | Performed by: NURSE PRACTITIONER

## 2022-05-17 PROCEDURE — 74011250636 HC RX REV CODE- 250/636: Performed by: HOSPITALIST

## 2022-05-17 PROCEDURE — P9016 RBC LEUKOCYTES REDUCED: HCPCS

## 2022-05-17 PROCEDURE — 83605 ASSAY OF LACTIC ACID: CPT

## 2022-05-17 PROCEDURE — 2709999900 HC NON-CHARGEABLE SUPPLY: Performed by: SPECIALIST

## 2022-05-17 PROCEDURE — 74019 RADEX ABDOMEN 2 VIEWS: CPT

## 2022-05-17 PROCEDURE — 80048 BASIC METABOLIC PNL TOTAL CA: CPT

## 2022-05-17 PROCEDURE — 84100 ASSAY OF PHOSPHORUS: CPT

## 2022-05-17 PROCEDURE — 74011000250 HC RX REV CODE- 250: Performed by: NURSE PRACTITIONER

## 2022-05-17 RX ORDER — LIDOCAINE HYDROCHLORIDE 20 MG/ML
INJECTION, SOLUTION EPIDURAL; INFILTRATION; INTRACAUDAL; PERINEURAL AS NEEDED
Status: DISCONTINUED | OUTPATIENT
Start: 2022-05-17 | End: 2022-05-17 | Stop reason: HOSPADM

## 2022-05-17 RX ORDER — HYDROXYCHLOROQUINE SULFATE 200 MG/1
200 TABLET, FILM COATED ORAL DAILY
COMMUNITY

## 2022-05-17 RX ORDER — FLUOXETINE HYDROCHLORIDE 20 MG/1
60 CAPSULE ORAL DAILY
Status: DISCONTINUED | OUTPATIENT
Start: 2022-05-17 | End: 2022-05-28 | Stop reason: HOSPADM

## 2022-05-17 RX ORDER — SUCRALFATE 1 G/1
1 TABLET ORAL
Status: DISCONTINUED | OUTPATIENT
Start: 2022-05-17 | End: 2022-05-28 | Stop reason: HOSPADM

## 2022-05-17 RX ORDER — SODIUM CHLORIDE 9 MG/ML
25 INJECTION, SOLUTION INTRAVENOUS CONTINUOUS
Status: DISCONTINUED | OUTPATIENT
Start: 2022-05-17 | End: 2022-05-17 | Stop reason: HOSPADM

## 2022-05-17 RX ORDER — GABAPENTIN 300 MG/1
300 CAPSULE ORAL 2 TIMES DAILY
Status: DISCONTINUED | OUTPATIENT
Start: 2022-05-17 | End: 2022-05-25

## 2022-05-17 RX ORDER — CLONAZEPAM 0.5 MG/1
0.5 TABLET ORAL DAILY
COMMUNITY
End: 2022-05-28

## 2022-05-17 RX ORDER — GLYCOPYRROLATE 0.2 MG/ML
INJECTION INTRAMUSCULAR; INTRAVENOUS AS NEEDED
Status: DISCONTINUED | OUTPATIENT
Start: 2022-05-17 | End: 2022-05-17 | Stop reason: HOSPADM

## 2022-05-17 RX ORDER — ONDANSETRON 2 MG/ML
4 INJECTION INTRAMUSCULAR; INTRAVENOUS EVERY 6 HOURS
Status: DISCONTINUED | OUTPATIENT
Start: 2022-05-17 | End: 2022-05-27 | Stop reason: SDUPTHER

## 2022-05-17 RX ORDER — MAG HYDROX/ALUMINUM HYD/SIMETH 200-200-20
30 SUSPENSION, ORAL (FINAL DOSE FORM) ORAL
Status: COMPLETED | OUTPATIENT
Start: 2022-05-17 | End: 2022-05-17

## 2022-05-17 RX ORDER — LIDOCAINE 4 G/100G
1 PATCH TOPICAL EVERY 24 HOURS
Status: DISCONTINUED | OUTPATIENT
Start: 2022-05-17 | End: 2022-05-28 | Stop reason: HOSPADM

## 2022-05-17 RX ORDER — LIOTHYRONINE SODIUM 5 UG/1
5 TABLET ORAL DAILY
COMMUNITY

## 2022-05-17 RX ORDER — HYDROMORPHONE HYDROCHLORIDE 1 MG/ML
0.2 INJECTION, SOLUTION INTRAMUSCULAR; INTRAVENOUS; SUBCUTANEOUS ONCE
Status: COMPLETED | OUTPATIENT
Start: 2022-05-17 | End: 2022-05-17

## 2022-05-17 RX ORDER — LORAZEPAM 2 MG/ML
0.5 INJECTION INTRAMUSCULAR ONCE
Status: COMPLETED | OUTPATIENT
Start: 2022-05-17 | End: 2022-05-17

## 2022-05-17 RX ORDER — FLUOXETINE HYDROCHLORIDE 20 MG/1
60 CAPSULE ORAL DAILY
COMMUNITY

## 2022-05-17 RX ORDER — FUROSEMIDE 40 MG/1
40 TABLET ORAL DAILY
COMMUNITY
End: 2022-05-28

## 2022-05-17 RX ORDER — LEVOTHYROXINE SODIUM 175 UG/1
175 TABLET ORAL
COMMUNITY
End: 2022-05-28

## 2022-05-17 RX ORDER — IBUPROFEN 200 MG
1 TABLET ORAL DAILY
Status: DISCONTINUED | OUTPATIENT
Start: 2022-05-17 | End: 2022-05-28 | Stop reason: HOSPADM

## 2022-05-17 RX ORDER — POTASSIUM CHLORIDE 7.45 MG/ML
10 INJECTION INTRAVENOUS
Status: COMPLETED | OUTPATIENT
Start: 2022-05-17 | End: 2022-05-17

## 2022-05-17 RX ORDER — SUCRALFATE 1 G/1
1 TABLET ORAL
COMMUNITY

## 2022-05-17 RX ORDER — LANOLIN ALCOHOL/MO/W.PET/CERES
6 CREAM (GRAM) TOPICAL
Status: DISCONTINUED | OUTPATIENT
Start: 2022-05-17 | End: 2022-05-18

## 2022-05-17 RX ORDER — TIZANIDINE 4 MG/1
4 TABLET ORAL
COMMUNITY

## 2022-05-17 RX ORDER — CLONAZEPAM 1 MG/1
0.5 TABLET ORAL DAILY
Status: DISCONTINUED | OUTPATIENT
Start: 2022-05-18 | End: 2022-05-17

## 2022-05-17 RX ORDER — PROPOFOL 10 MG/ML
INJECTION, EMULSION INTRAVENOUS AS NEEDED
Status: DISCONTINUED | OUTPATIENT
Start: 2022-05-17 | End: 2022-05-17 | Stop reason: HOSPADM

## 2022-05-17 RX ORDER — ROSUVASTATIN CALCIUM 20 MG/1
20 TABLET, COATED ORAL DAILY
COMMUNITY

## 2022-05-17 RX ORDER — AZATHIOPRINE 50 MG/1
50 TABLET ORAL DAILY
COMMUNITY

## 2022-05-17 RX ORDER — CLONAZEPAM 0.5 MG/1
0.5 TABLET ORAL
Status: DISCONTINUED | OUTPATIENT
Start: 2022-05-17 | End: 2022-05-18

## 2022-05-17 RX ORDER — BUTALBITAL, ASPIRIN AND CAFFEINE 50; 325; 40 MG/1; MG/1; MG/1
1 TABLET ORAL
Status: ON HOLD | COMMUNITY
End: 2022-05-25 | Stop reason: SDUPTHER

## 2022-05-17 RX ORDER — PROMETHAZINE HYDROCHLORIDE 6.25 MG/5ML
12.5 SYRUP ORAL
COMMUNITY

## 2022-05-17 RX ADMIN — IPRATROPIUM BROMIDE AND ALBUTEROL SULFATE 3 ML: .5; 3 SOLUTION RESPIRATORY (INHALATION) at 07:13

## 2022-05-17 RX ADMIN — CLONAZEPAM 0.5 MG: 1 TABLET ORAL at 20:36

## 2022-05-17 RX ADMIN — ACETAMINOPHEN 1000 MG: 500 TABLET ORAL at 16:11

## 2022-05-17 RX ADMIN — IPRATROPIUM BROMIDE AND ALBUTEROL SULFATE 3 ML: .5; 3 SOLUTION RESPIRATORY (INHALATION) at 01:20

## 2022-05-17 RX ADMIN — ONDANSETRON 4 MG: 2 INJECTION INTRAMUSCULAR; INTRAVENOUS at 01:30

## 2022-05-17 RX ADMIN — SODIUM CHLORIDE, POTASSIUM CHLORIDE, SODIUM LACTATE AND CALCIUM CHLORIDE 500 ML: 600; 310; 30; 20 INJECTION, SOLUTION INTRAVENOUS at 00:00

## 2022-05-17 RX ADMIN — Medication 1 AMPULE: at 09:00

## 2022-05-17 RX ADMIN — IPRATROPIUM BROMIDE AND ALBUTEROL SULFATE 3 ML: .5; 3 SOLUTION RESPIRATORY (INHALATION) at 13:31

## 2022-05-17 RX ADMIN — SODIUM CHLORIDE 40 MG: 9 INJECTION, SOLUTION INTRAMUSCULAR; INTRAVENOUS; SUBCUTANEOUS at 06:34

## 2022-05-17 RX ADMIN — SODIUM CHLORIDE, PRESERVATIVE FREE 10 ML: 5 INJECTION INTRAVENOUS at 05:13

## 2022-05-17 RX ADMIN — ACETAMINOPHEN 1000 MG: 500 TABLET ORAL at 05:13

## 2022-05-17 RX ADMIN — ALUMINUM HYDROXIDE, MAGNESIUM HYDROXIDE, AND SIMETHICONE 30 ML: 200; 200; 20 SUSPENSION ORAL at 05:13

## 2022-05-17 RX ADMIN — LEVOTHYROXINE SODIUM 150 MCG: 0.1 TABLET ORAL at 05:12

## 2022-05-17 RX ADMIN — ONDANSETRON HYDROCHLORIDE 4 MG: 2 INJECTION, SOLUTION INTRAMUSCULAR; INTRAVENOUS at 14:12

## 2022-05-17 RX ADMIN — PROPOFOL 50 MG: 10 INJECTION, EMULSION INTRAVENOUS at 12:43

## 2022-05-17 RX ADMIN — HYDROMORPHONE HYDROCHLORIDE 0.2 MG: 1 INJECTION, SOLUTION INTRAMUSCULAR; INTRAVENOUS; SUBCUTANEOUS at 05:13

## 2022-05-17 RX ADMIN — ONDANSETRON HYDROCHLORIDE 4 MG: 2 INJECTION, SOLUTION INTRAMUSCULAR; INTRAVENOUS at 20:32

## 2022-05-17 RX ADMIN — FLUOXETINE HYDROCHLORIDE 60 MG: 20 CAPSULE ORAL at 16:10

## 2022-05-17 RX ADMIN — POTASSIUM CHLORIDE 10 MEQ: 7.46 INJECTION, SOLUTION INTRAVENOUS at 03:21

## 2022-05-17 RX ADMIN — SODIUM CHLORIDE, PRESERVATIVE FREE 10 ML: 5 INJECTION INTRAVENOUS at 13:00

## 2022-05-17 RX ADMIN — SODIUM CHLORIDE, PRESERVATIVE FREE 10 ML: 5 INJECTION INTRAVENOUS at 01:32

## 2022-05-17 RX ADMIN — Medication 1 AMPULE: at 20:33

## 2022-05-17 RX ADMIN — SODIUM CHLORIDE, PRESERVATIVE FREE 10 ML: 5 INJECTION INTRAVENOUS at 20:35

## 2022-05-17 RX ADMIN — BUDESONIDE 250 MCG: 0.25 INHALANT RESPIRATORY (INHALATION) at 07:39

## 2022-05-17 RX ADMIN — GLYCOPYRROLATE 0.2 MG: 0.2 INJECTION, SOLUTION INTRAMUSCULAR; INTRAVENOUS at 12:38

## 2022-05-17 RX ADMIN — SODIUM CHLORIDE 25 ML/HR: 9 INJECTION, SOLUTION INTRAVENOUS at 12:19

## 2022-05-17 RX ADMIN — SUCRALFATE 1 G: 1 TABLET ORAL at 16:11

## 2022-05-17 RX ADMIN — BUDESONIDE 250 MCG: 0.25 INHALANT RESPIRATORY (INHALATION) at 19:48

## 2022-05-17 RX ADMIN — IPRATROPIUM BROMIDE AND ALBUTEROL SULFATE 3 ML: .5; 3 SOLUTION RESPIRATORY (INHALATION) at 19:48

## 2022-05-17 RX ADMIN — PROPOFOL 100 MG: 10 INJECTION, EMULSION INTRAVENOUS at 12:41

## 2022-05-17 RX ADMIN — GABAPENTIN 300 MG: 300 CAPSULE ORAL at 20:33

## 2022-05-17 RX ADMIN — LIDOCAINE HYDROCHLORIDE 100 MG: 20 INJECTION, SOLUTION EPIDURAL; INFILTRATION; INTRACAUDAL; PERINEURAL at 12:41

## 2022-05-17 RX ADMIN — HYDROMORPHONE HYDROCHLORIDE 0.2 MG: 1 INJECTION, SOLUTION INTRAMUSCULAR; INTRAVENOUS; SUBCUTANEOUS at 01:30

## 2022-05-17 RX ADMIN — LORAZEPAM 0.5 MG: 2 INJECTION INTRAMUSCULAR; INTRAVENOUS at 03:00

## 2022-05-17 RX ADMIN — SODIUM CHLORIDE 40 MG: 9 INJECTION, SOLUTION INTRAMUSCULAR; INTRAVENOUS; SUBCUTANEOUS at 20:34

## 2022-05-17 NOTE — CONSULTS
Psychiatric Consultation      DATE OF EVALUATION:  5/17/2022    ATTENDING PHYSICIAN:  Morro Ames MD    REASON FOR REFERRAL:    Psychiatric consultation requested for [address] to evaluate patient Schizoaffective disorder. Myriam Patton HISTORY OF PRESENT ILLNESS:  The patient, [address], is a 62 y.o.  female who presents on an inpatient medical unit. Patient is alert and oriented x 4. Patient is calm and co-operative during interview. States not sleeping good, due to chronic pain. Reports she is hungry and ready for some clear liquids. Patient just completed endoscopy study and has been NPO per [employer] Patient reports increased anxiety about not having her usual medications for her lupus and pain management. States depressed due to relationship with ex-spouse and needed to get back home to [place]. Denies all substance use including tobacco and ETOH. Denies A/VH. Denies SI/ HI or plan. Stating she would never hurt herself or anyone else. Patient ask this provider about going to a different unit in the hospital. This provider informed her once she is medically stable she could be transferred when her attending doctor finds it to be appropriate. Discussed working with social workers to help get her back home and in a skill nursing facility in [place]. PAST MEDICAL HISTORY  Patient Active Problem List    Diagnosis Date Noted    Hematemesis 05/17/2022    Iron deficiency anemia 05/16/2022    GI bleed 05/16/2022     Past Medical History:   Diagnosis Date    Arthritis     Iron deficiency     Lupus ([facility] [address]     Osteoporosis     Sarcoidosis         MEDICATION PRIOR TO ADMISSION:  Prior to Admission medications    Medication Sig Start Date End Date Taking? Authorizing Provider   sucralfate (CARAFATE) 1 gram tablet Take 1 g by mouth Before breakfast and dinner. Yes Provider, Historical   azaTHIOprine (IMURAN) 50 mg tablet Take 50 mg by mouth daily.    Yes Provider, Historical vortioxetine (Trintellix) 10 mg tablet Take 10 mg by mouth daily. Yes Provider, Historical   FLUoxetine (PROzac) 20 mg capsule Take 60 mg by mouth daily. Yes Provider, Historical   butalbital-aspirin-caffeine (FIORINAL) -40 mg tablet Take 1 Tablet by mouth two (2) times daily as needed for Headache. Yes Provider, Historical   rosuvastatin (CRESTOR) 20 mg tablet Take 20 mg by mouth daily. Yes Provider, Historical   hydrOXYchloroQUINE (PLAQUENIL) 200 mg tablet Take 200 mg by mouth daily. Yes Provider, Historical   levothyroxine (SYNTHROID) 175 mcg tablet Take 175 mcg by mouth Daily (before breakfast). Yes Provider, Historical   tiZANidine (ZANAFLEX) 4 mg tablet Take 4 mg by mouth nightly. Yes Provider, Historical   furosemide (LASIX) 40 mg tablet Take 40 mg by mouth daily. Yes Provider, Historical   clonazePAM (KlonoPIN) 0.5 mg tablet Take 0.5 mg by mouth daily. Yes Provider, Historical   liothyronine (CYTOMEL) 5 mcg tablet Take 5 mcg by mouth daily. Yes Provider, Historical   promethazine (PHENERGAN) 6.25 mg/5 mL syrup Take 12.5 mg by mouth two (2) times daily as needed for Nausea. Yes Provider, Historical       ALLERGIES:  Allergies   Allergen Reactions    Haldol [Haloperidol Lactate] Anaphylaxis    Risperidone Anaphylaxis    Clarithromycin Other (comments)     Urticaria      Psychosocial History:  Schizoaffective disorder with paranoia  History of OD / ? Klonopin  Chronic pain  Lives with ex-  Disabled   Completed 10 grade        SOCIAL HISTORY:  Social History     Tobacco Use    Smoking status: Former Smoker     Types: Cigarettes    Smokeless tobacco: Never Used   Substance Use Topics    Alcohol use: Never       FAMILY HISTORY:  History reviewed. No pertinent family history. REVIEW OF SYSTEMS:  The patient denies/reports complaints of rishi, depression, anxiety, delusions,   A/V hallucinations, suicidal ideation, homicidal ideation.     Medical review of systems mainly considered negative. MENTAL STATUS EXAM:  Sensorium  oriented to time, place and person   Orientation person, place, time/date, situation, day of week, month of year and year   Relations cooperative   Eye Contact poor   Appearance:  disheveled   Motor Behavior:  within normal limits   Speech:  normal pitch, normal volume and non-pressured   Vocabulary average   Thought Process: goal directed and logical   Thought Content free of delusions and free of hallucinations   Suicidal ideations no plan  and no intention   Homicidal ideations no plan  and no intention   Mood:  anxious   Affect:  anxious   Memory recent  adequate   Memory remote:  adequate   Concentration:  adequate   Abstraction:  abstract   Insight:  fair   Reliability poor   Judgment:  poor     ASSESSMENT:  The patient is a 62 y.o.  female with a history of schizoaffective disorder, with paranoia and general anxiety disorder. Plan:  Patient does not meet inpatient criteria for behavioral unit at this time. Re-consult as needed for behavioral changes       We will follow patient's course along with you as necessary. Thank you for the opportunity to participate in the care of your patient.                     SIGNED:    Joseph Nicole NP  5/17/2022

## 2022-05-17 NOTE — H&P
[facility]    ICU TEAM Progress Note    Name: Ene Leon   : 1960   MRN: [id number]   Date: 2022      Subjective:   Progress Note: 2022      61 y/o F pt with PMH of COPD, iron deficiency anemia, peptic ulcer disease, lupus, sarcoidosis presented to ED with c/o SOB, N/V/abdominal pain. Pt reports vomiting blood yesterday but no hematemesis today. Upon arrival to ED, pt hypotensive to 63/40, given 2 L IVF with improvement in BP. Labs significant for Hg of 8.3 (down from 10.3 on 5/10). GI consulted and recommended protonix and will see in am unless she becomes unstable with s/o active bleeding. ICU consulted for admission. Upon my arrival pt's BP 92/60, HR 50.  C/o being cold and leg and abdominal pain. She is alert and oriented but with a disorganized speech pattern. Will be admitted to the ICU for further management. Active Problem List:     Problem List  Never Reviewed          Codes Class    Iron deficiency anemia ICD-10-CM: D50.9  ICD-9-CM: 280.9         GI bleed ICD-10-CM: K92.2  ICD-9-CM: 578.9               Past Medical History:      has a past medical history of Arthritis, Iron deficiency, Lupus ([address] Osteoporosis, and Sarcoidosis. Past Surgical History:      has a past surgical history that includes hx hernia repair (). Home Medications:     Prior to Admission medications    Not on File       Allergies/Social/Family History: Allergies   Allergen Reactions    Haldol [Haloperidol Lactate] Anaphylaxis    Risperidone Anaphylaxis    Clarithromycin Other (comments)     Urticaria      Social History     Tobacco Use    Smoking status: Former Smoker     Types: Cigarettes    Smokeless tobacco: Never Used   Substance Use Topics    Alcohol use: Never      No family history on file. Review of Systems:     Pertinent items are noted in HPI.     Objective:   Vital Signs:  Visit Vitals  BP 92/60   Pulse (!) 51   Temp 98.1 °F (36.7 °C)   Resp 21   Ht 5' 3\" (1.6 m)   Wt 55.7 kg (122 lb 12.7 oz)   SpO2 100%   BMI 21.75 kg/m²      O2 Device: None (Room air) Temp (24hrs), Av.1 °F (36.7 °C), Min:98.1 °F (36.7 °C), Max:98.1 °F (36.7 °C)           Intake/Output:     Intake/Output Summary (Last 24 hours) at 2022 2317  Last data filed at 2022 2210  Gross per 24 hour   Intake 2000 ml   Output 400 ml   Net 1600 ml       Physical Exam:    General:  alert, mild distress  Eye:  conjunctivae/corneas clear. PERRL,  Neurologic:  no focal deficits  Lymphatic:  Cervical, supraclavicular, and axillary nodes normal.   Neck:  normal and no erythema or exudates noted. Lungs:  clear to auscultation bilaterally  Heart:  regular rhythm with bradycardia, S1, S2 normal, no murmur, click, rub or gallop  Abdomen:  Soft but tender. Bowel sounds normal. No masses,  no organomegaly  Cardiovascular:  S1S2 present, without murmur or extra heart sounds, pedal pulses normal and no edema  Skin:  Normal.    LABS AND  DATA: Personally reviewed  Recent Labs     22  1657   WBC 12.6*   HGB 8.3*   HCT 27.1*   *     Recent Labs     22  1657   *   K 3.3*      CO2 22   BUN 19   CREA 0.96   *   CA 9.1     Recent Labs     22  1916 22  1657   AP  --  77   TP  --  7.2   ALB  --  3.0*   GLOB  --  4.2*   LPSE 109  --      No results for input(s): INR, PTP, APTT, INREXT in the last 72 hours. No results for input(s): PHI, PCO2I, PO2I, FIO2I in the last 72 hours. No results for input(s): CPK, CKMB, TROIQ, BNPP in the last 72 hours. Hemodynamics:   PAP:   CO:     Wedge:   CI:     CVP:    SVR:       PVR:       Ventilator Settings:  Mode Rate Tidal Volume Pressure FiO2 PEEP                    Peak airway pressure:      Minute ventilation:          MEDS: Reviewed    Chest X-Ray: personally reviewed and report checked      Assessment and Plan:     Discussed Plan of Care (goals of care):  Yes  Addressed Code Status: Full Code    Hypovolemic shock  Acute on chronic anemia due to GI bleed  Peptic ulcer disease  -s/p 2 L IVF in ED  -give additional 500 cc IVF bolus for hypotension while awaiting PRBC   -protonix 80mg x1 dose then 40mg BID  -2 large bore IVs  -Dr. Marcela Cloud (GI doctor) consulted and plan for EGD in am   -NPO  -Hg 10.3-->8.3-->6.5, given 1 unit PRBC  -transfusion goal Hg>7  -CBC Q 4 hr      Bradycardia  PMH of hypothyroidism  -send TSH  -cont home synthroid    PMH of COPD  -duoneb Q 6 hr  -budesonide neb Q 12    T/L/D  Tubes: None  Lines: Peripheral IV  Drains: None    DISPOSITION  Stay in ICU    CRITICAL CARE CONSULTANT NOTE  I had a face to face encounter with the patient, reviewed and interpreted patient data including clinical events, labs, images, vital signs, I/O's, and examined patient. I have discussed the case and the plan and management of the patient's care with the consulting services, the bedside nurses and the respiratory therapist.      NOTE OF PERSONAL INVOLVEMENT IN CARE   This patient has a high probability of imminent, clinically significant deterioration, which requires the highest level of preparedness to intervene urgently. I participated in the decision-making and personally managed or directed the management of the following life and organ supporting interventions that required my frequent assessment to treat or prevent imminent deterioration. I personally spent 50 minutes of critical care time. This is time spent at this critically ill patient's bedside actively involved in patient care as well as the coordination of care and discussions with the patient's family. This does not include any procedural time which has been billed separately.     Hedy Bloch, NP  [facility]  5/16/2022

## 2022-05-17 NOTE — PROCEDURES
Esophagogastroduodenoscopy Procedure Note      Pamela Hancock  1960  [id number]    Indication: Hematemesis     Endoscopist: Carlos Dinero MD    Referring Provider:  Yesenia, MD [place]    Sedation:  MAC anesthesia Propofol    Procedure Details:  After infomed consent was obtained for the procedure, with all risks and benefits of procedure explained the patient was taken to the endoscopy suite and placed in the left lateral decubitus position. Following sequential administration of sedation as per above, the endoscope was inserted into the mouth and advanced under direct vision to second portion of the duodenum. A careful inspection was made as the gastroscope was withdrawn, including a retroflexed view of the proximal stomach; findings and interventions are described below. Findings:     Esophagus:   + Lower 1/3 of esophagus with scarred appearing mucosa with some friability seen extending from 30-33 cm from incisors. Slight oozing but no mucosal tear seen. This appeared c/w partially healed LA Grade B erosive esophagitis. No bx performed. There was no other nodularity seen or mucosal lesions in esophagus. + There was a moderate sized hiatal hernia with GEJ located at 40 cm form incisors. Stomach:   + Normal mucosa throughout the stomach that appeared cylindrical shaped in keeping with prior Gastric sleeve. No ulcers in stomach. Pylorus patent. Duodenum:   - Normal mucosa to second portion. Therapies: none    Specimen: none            Complications:   None were encountered during the procedure. EBL:  < 10 ml.           Recommendations:   -PPI BID  -Carafate QID  -F/U with her bariatric surgeon and GI team in [address] [place] [address] Road, MD  5/17/2022  12:51 PM

## 2022-05-17 NOTE — PROGRESS NOTES
0715 Verbal shift change report received. 8646 Patient requesting pain medication and visually upset when told she has to wait until after endo procedure. Patient stating she will not have procedure done if she cant have dilaudid. Informed patient that they will give her medication during the procedure. 1130 Patient upset that endo procedure hasnt happened yet. She has called the  from the room phone several times and asked to be connected with endoscopy where she has then told them that they are taking to long. Patient is giving time ultimatums stating that if they dont come by a certain time, she is calling patient advocacy. She has made similar comments about nursing staff. 1145: Transport here to pick patient up and take her to Endo lab.     1325: Patient back in room from procedure. Patient very upset that she is not being given dilaudid. Patient states that she will call the \"higher ups\" if she doesn't get pain medication. 1400: Patient c/o nausea, zofran administered. See MAR    1600: Patient requesting tylenol, toradol, and ativan. Spoke with intensivist and relayed request. Tylenol okay to give and doctor ordered home med of prozac. Patient made aware of medications and upset. States \"can I have dilaudid or im signing myself out. \" Maddy Roth declined to give her dilaudid. 1730: Teledoc psych consult. 1800 Hospitalist in room talking with patient. 1930 Verbal shift change report given to Susu Leyva RN via Joan [employer]. Chart, medications and treatment plan.

## 2022-05-17 NOTE — PROGRESS NOTES
0000: patient arrived from ER. Hemoglobin 6.5  One unit of prbc's ordered. Heart rate in the 40-50 NSR. Patient anxious and restless complaining of abdominal pain and nausea. MD notified dilaudid 0.2g ordered. 0130: complete assessment done- see flow sheet. patient medicated with dilaudid 0.2mg for abdominal pain and zofran for nausea. 0230: patient still complaining of abdominal pain and anxiety. NP notified  Ativan 0.5mg iv ordered. Hypotension- blood in progress. 0500: patient complains of abdominal pain 7/10. NP notified dilaudid 0.2mg iv ordered. 4536: reassessment- no changes       Bedside shift change report given to [place] (oncoming nurse) by Darby Bueno). Report included the following information Kardex, ED Summary, Intake/Output, MAR, Recent Results, Cardiac Rhythm NSR  and Alarm Parameters .

## 2022-05-17 NOTE — PROGRESS NOTES
Patient transported to [facility] [id number] on monitor with two RN assist  Patient laying in bed with monitor on     TRANSFER - OUT REPORT:    Verbal report given to Trace RN on [address]  being transferred to 2520(unit) for routine progression of care       Report consisted of patients Situation, Background, Assessment and   Recommendations(SBAR). Information from the following report(s) Procedure Summary was reviewed with the receiving nurse. Opportunity for questions and clarification was provided.

## 2022-05-17 NOTE — PROGRESS NOTES
Inserted 20g 6cm Endurance Extended Dwell Peripheral Catheter into right basilic using ultrasound guidance and sterile technique. The Endurance catheter is an extended dwell peripheral catheter and may remain in place 29 days. Blood samples can be obtained from this catheter. To obtain labs, a tourniquet may be used, flush with 10ml NS, waste 3ml, draw labs, flush with 20ml NS. Dressings needs to be changed with central line dressing kit using bio patch and stat lock every 7 days by nurse. Patient tolerated procedure well, denies questions or concerns at this time. Patient resting in bed, sitter at bedside, call bell in reach, bed in lowest position, side rails up x 3. Primary nurse, Kassandra Dean RN notified.             Osiel Wetzel RN [facility]  Vascular Access Nurse

## 2022-05-17 NOTE — ED PROVIDER NOTES
EMERGENCY DEPARTMENT HISTORY AND PHYSICAL EXAM      Date: 5/16/2022  Patient Name: Ene Leon    History of Presenting Illness     Chief Complaint   Patient presents with    Shortness of Breath     she is bleeding and is short of breath and sent to ED for low hemoglobin. she is to come here for blood transfusion per her doctor       History Provided By: Patient    HPI: Ene Leon, 62 y.o. female with history of sarcoidosis, lupus, iron deficiency anemia, history of bleeding peptic ulcer disease requiring transfusions in the past presents to the ED with cc of shortness of breath, generalized weakness, fatigue, epigastric abdominal pain, nausea, vomiting, hematemesis. Symptoms have been present over the past 1 week. She endorses very poor p.o. intake over the past 1 week due to persistent symptoms. She endorses multiple episodes of hematemesis which started out with bright red blood and has now become dark brown-maroon colored. Denies fevers, chills, chest pain. She states that she is from [place] and recently came down to the [facility] where she is staying with her cousin. She does not know if this is a permanent location for her. She was seen at [facility] 6 days ago on 5/10 where she was noted to have a hemoglobin of 10.3 and discharged home. There are no other complaints, changes, or physical findings at this time. PCP: Yesenia, MD Chelsea    No current facility-administered medications on file prior to encounter. No current outpatient medications on file prior to encounter. Past History     Past Medical History:  Past Medical History:   Diagnosis Date    Arthritis     Iron deficiency     Lupus ([address]     Osteoporosis     Sarcoidosis        Past Surgical History:  Past Surgical History:   Procedure Laterality Date    HX HERNIA REPAIR  2013    needs a repat she says       Family History:  No family history on file.     Social History:  Social History     Tobacco Use  Smoking status: Former Smoker     Types: Cigarettes    Smokeless tobacco: Never Used   Vaping Use    Vaping Use: Every day    Substances: Nicotine    Devices: Pre-filled or refillable cartridge   Substance Use Topics    Alcohol use: Never    Drug use: Not on file       Allergies: Allergies   Allergen Reactions    Haldol [Haloperidol Lactate] Anaphylaxis    Risperidone Anaphylaxis    Clarithromycin Other (comments)     Urticaria         Review of Systems   Review of Systems   Constitutional: Positive for activity change, appetite change and fatigue. Negative for chills and fever. Respiratory: Positive for shortness of breath. Negative for cough. Cardiovascular: Negative for chest pain and leg swelling. Gastrointestinal: Positive for abdominal pain, nausea and vomiting. Musculoskeletal: Negative for back pain and gait problem. Skin: Negative for color change and rash. Neurological: Negative for dizziness, weakness, light-headedness and headaches. All other systems reviewed and are negative. Physical Exam   Physical Exam  Vitals and nursing note reviewed. Constitutional:       General: She is not in acute distress. Appearance: Normal appearance. She is not ill-appearing or toxic-appearing. HENT:      Head: Normocephalic and atraumatic. Nose: Nose normal.      Mouth/Throat:      Mouth: Mucous membranes are moist.   Eyes:      Extraocular Movements: Extraocular movements intact. Pupils: Pupils are equal, round, and reactive to light. Cardiovascular:      Rate and Rhythm: Regular rhythm. Bradycardia present. Heart sounds: No murmur heard. Pulmonary:      Effort: Pulmonary effort is normal. No respiratory distress. Breath sounds: Normal breath sounds. No wheezing. Abdominal:      General: There is no distension. Palpations: Abdomen is soft. Tenderness: There is no abdominal tenderness. There is no guarding or rebound.    Musculoskeletal: General: No swelling or tenderness. Normal range of motion. Cervical back: Normal range of motion and neck supple. Right lower leg: No edema. Left lower leg: No edema. Skin:     General: Skin is warm and dry. Coloration: Skin is not pale. Findings: No erythema. Neurological:      General: No focal deficit present. Mental Status: She is alert and oriented to person, place, and time. Diagnostic Study Results     Labs -     Recent Results (from the past 12 hour(s))   CBC WITH AUTOMATED DIFF    Collection Time: 05/16/22  4:57 PM   Result Value Ref Range    WBC 12.6 (H) 3.6 - 11.0 K/uL    RBC 3.04 (L) 3.80 - 5.20 M/uL    HGB 8.3 (L) 11.5 - 16.0 g/dL    HCT 27.1 (L) 35.0 - 47.0 %    MCV 89.1 80.0 - 99.0 FL    MCH 27.3 26.0 - 34.0 PG    MCHC 30.6 30.0 - 36.5 g/dL    RDW 16.2 (H) 11.5 - 14.5 %    PLATELET 314 (H) 642 - 400 K/uL    MPV 8.3 (L) 8.9 - 12.9 FL    NRBC 0.0 0  WBC    ABSOLUTE NRBC 0.00 0.00 - 0.01 K/uL    NEUTROPHILS 72 32 - 75 %    LYMPHOCYTES 19 12 - 49 %    MONOCYTES 7 5 - 13 %    EOSINOPHILS 1 0 - 7 %    BASOPHILS 0 0 - 1 %    IMMATURE GRANULOCYTES 1 (H) 0.0 - 0.5 %    ABS. NEUTROPHILS 9.1 (H) 1.8 - 8.0 K/UL    ABS. LYMPHOCYTES 2.4 0.8 - 3.5 K/UL    ABS. MONOCYTES 0.9 0.0 - 1.0 K/UL    ABS. EOSINOPHILS 0.1 0.0 - 0.4 K/UL    ABS. BASOPHILS 0.0 0.0 - 0.1 K/UL    ABS. IMM.  GRANS. 0.1 (H) 0.00 - 0.04 K/UL    DF SMEAR SCANNED      RBC COMMENTS NORMOCYTIC, NORMOCHROMIC     METABOLIC PANEL, COMPREHENSIVE    Collection Time: 05/16/22  4:57 PM   Result Value Ref Range    Sodium 133 (L) 136 - 145 mmol/L    Potassium 3.3 (L) 3.5 - 5.1 mmol/L    Chloride 101 97 - 108 mmol/L    CO2 22 21 - 32 mmol/L    Anion gap 10 5 - 15 mmol/L    Glucose 101 (H) 65 - 100 mg/dL    BUN 19 6 - 20 MG/DL    Creatinine 0.96 0.55 - 1.02 MG/DL    BUN/Creatinine ratio 20 12 - 20      GFR est AA >60 >60 ml/min/1.73m2    GFR est non-AA 59 (L) >60 ml/min/1.73m2    Calcium 9.1 8.5 - 10.1 MG/DL Bilirubin, total 0.2 0.2 - 1.0 MG/DL    ALT (SGPT) 18 12 - 78 U/L    AST (SGOT) 16 15 - 37 U/L    Alk. phosphatase 77 45 - 117 U/L    Protein, total 7.2 6.4 - 8.2 g/dL    Albumin 3.0 (L) 3.5 - 5.0 g/dL    Globulin 4.2 (H) 2.0 - 4.0 g/dL    A-G Ratio 0.7 (L) 1.1 - 2.2     TROPONIN-HIGH SENSITIVITY    Collection Time: 05/16/22  4:57 PM   Result Value Ref Range    Troponin-High Sensitivity 8 0 - 51 ng/L   NT-PRO BNP    Collection Time: 05/16/22  4:57 PM   Result Value Ref Range    NT pro- <125 PG/ML   TYPE & SCREEN    Collection Time: 05/16/22  4:57 PM   Result Value Ref Range    Crossmatch Expiration 05/19/2022,2359     ABO/Rh(D) A POSITIVE     Antibody screen NEG    EKG, 12 LEAD, INITIAL    Collection Time: 05/16/22  5:02 PM   Result Value Ref Range    Ventricular Rate 59 BPM    Atrial Rate 59 BPM    P-R Interval 118 ms    QRS Duration 90 ms    Q-T Interval 476 ms    QTC Calculation (Bezet) 471 ms    Calculated P Axis 39 degrees    Calculated R Axis 41 degrees    Calculated T Axis 31 degrees    Diagnosis       Sinus bradycardia  Possible Left atrial enlargement  No previous ECGs available     BLOOD GAS,CHEM8,LACTIC ACID POC    Collection Time: 05/16/22  7:13 PM   Result Value Ref Range    Calcium, ionized (POC) 1.15 1.12 - 1.32 mmol/L    BICARBONATE 20 mmol/L    Base deficit (POC) 2.7 mmol/L    Sample source VENOUS BLOOD      CO2, POC 21 19 - 24 MMOL/L    Sodium,  136 - 145 MMOL/L    Potassium, POC 3.3 (L) 3.5 - 5.5 MMOL/L    Chloride,  100 - 108 MMOL/L    Glucose, POC 80 74 - 106 MG/DL    Creatinine, POC 0.8 0.6 - 1.3 MG/DL    Lactic Acid (POC) 1.10 0.40 - 2.00 mmol/L    pH, venous (POC) 7.47 (H) 7.32 - 7.42      pCO2, venous (POC) 28.0 (L) 41 - 51 MMHG    pO2, venous (POC) 35 25 - 40 mmHg   LIPASE    Collection Time: 05/16/22  7:16 PM   Result Value Ref Range    Lipase 109 73 - 393 U/L       Radiologic Studies -   XR CHEST PORT   Final Result   No acute cardiopulmonary findings.       CT HEAD WO CONT    (Results Pending)     CT Results  (Last 48 hours)    None        CXR Results  (Last 48 hours)               05/16/22 1718  XR CHEST PORT Final result    Impression:  No acute cardiopulmonary findings. Narrative:  EXAM: XR CHEST PORT       INDICATION: Shortness of breath       COMPARISON: None. FINDINGS: A portable AP radiograph of the chest was obtained at 1714 hours. Examination is slightly limited by the chin positioning. The lungs are clear. Heart size is normal.  There is no focal airspace consolidation. There is no   evidence of pleural effusion or pneumothorax. Patient is status post bilateral   shoulder arthroplasties. Medical Decision Making   I am the first provider for this patient. I reviewed the vital signs, available nursing notes, past medical history, past surgical history, family history and social history. Vital Signs-Reviewed the patient's vital signs. Patient Vitals for the past 12 hrs:   Temp Pulse Resp BP SpO2   05/16/22 2207 -- (!) 50 13 (!) 90/50 91 %   05/16/22 2137 -- (!) 47 10 (!) 94/54 100 %   05/16/22 2122 -- 60 21 (!) 95/57 99 %   05/16/22 2037 -- (!) 52 12 (!) 82/52 100 %   05/16/22 2007 -- (!) 58 13 (!) 83/48 100 %   05/16/22 1922 -- (!) 58 15 (!) 76/63 100 %   05/16/22 1907 -- (!) 56 17 (!) 63/40 100 %   05/16/22 1809 -- 60 10 -- --   05/16/22 1808 -- -- -- (!) 102/59 --   05/16/22 1804 -- -- -- -- 100 %   05/16/22 1652 98.1 °F (36.7 °C) 71 22 (!) 75/46 100 %       EKG interpretation:   EKG per my interpretation sinus bradycardia, rate 59 bpm, normal axis, no acute ischemic changes or interval changes. Records Reviewed: Nursing Notes, Old Medical Records, Previous Radiology Studies and Previous Laboratory Studies  I personally reviewed prior hospitalization records from earlier this year when she was admitted in [facility].     Provider Notes (Medical Decision Making):   81-year-old female with above history presenting with reported hematemesis, nausea, vomiting, epigastric pain, weakness and fatigue and poor p.o. intake. She appears dry, pale, and dehydrated. She is noted to be hypotensive upon arrival and has had persistent borderline low blood pressures despite 2 L IV fluid bolus. She is noted to have a two-point hemoglobin drop from 10.3 down to 8.3 over the course of 6 days. I have concern for upper GI bleed given her reported hematemesis and prior history of GI bleed requiring blood transfusions. I have typed and crossed patient but will hold on any blood product administration at this time. I will consult GI and plan for admission. ED Course:   Initial assessment performed. The patients presenting problems have been discussed, and they are in agreement with the care plan formulated and outlined with them. I have encouraged them to ask questions as they arise throughout their visit. ED Course as of 05/16/22 8244   Mon May 16, 2022   1913 Spoke with Dr. Frances Pandey, GI, agrees with plan to fluid resuscitate, start 80 mg IV Protonix, and keep n.p.o.. If patient's blood pressure remains stable then he will plan on EGD in the morning, if she decompensates or becomes unstable from a hemodynamic perspective then he will plan on EGD tonight. [AK]   1959 EKG per my interpretation sinus bradycardia, rate 59 bpm, normal axis, no acute ischemic changes or interval changes. [AK]      ED Course User Index  [AK] Alphonse Becerra MD         Cardiac Monitoring: The cardiac monitor revealed the following rhythm as interpreted by me: Sinus bradycardia, rate 55 bpm  The cardiac monitor was ordered secondary to the patient's reported complaint of weakness and shortness of breath and to monitor the patient for dysrhythmia.   Dinah Spencer MD      Admission Note:  Patient is being admitted to the hospital by Dr. Cornelio Desir, Service: [facility].  The results of their tests and reasons for their admission have been discussed with them and available family. They convey agreement and understanding for the need to be admitted and for their admission diagnosis. Critical Care Documentation  I have spent 83 minutes of critical care time in evaluating and treating this patient. This includes time spent at bedside, time with family and decision makers, documentation, review of labs and imaging, and/or consultation with specialists. It does not include time spent on separately billed procedures. This patient presents with a critical illness or injury that acutely impairs one or more vital organ systems such that there is a high probability of imminent or life threatening deterioration in the patient's condition. This case involved decision making of high complexity to assess, manipulate, and support vital organ system failure and/or to prevent further life threatening deterioration of the patient's condition. Failure to initiate these interventions on an urgent basis would likely result in sudden, clinically significant or life threatening deterioration in the patient's condition. This case required my direct attention, intervention, and personal management for the time documented above. This time does not include separately billed interventions/procedures which are separately documented. Abnormal findings supporting critical care: Persistent hypotension in setting of dehydration as well as probable upper GI bleed  Interventions to support critical care: Frequent reassessment of hemodynamic instability, fluid resuscitation with 2 L IV fluid bolus, consultation multiple specialist including GI and ICU, typed and crossed packed red blood cells  Failure to intervene may result in: Hemodynamic instability and collapse, worsening dehydration, electrolyte abnormality, JOANA, hemorrhagic shock, death  Mark Quiñones MD      Disposition:  Admit to ICU      Diagnosis     Clinical Impression:   1. Upper GI bleed    2.  Hypotension due to hypovolemia Attestations:  I am the first and primary provider of record for this patient's ED encounter. I personally performed the services described above in this documentation. Mandi Maya MD    Please note that this dictation was completed with [employer], the computer voice recognition software. Quite often unanticipated grammatical, syntax, homophones, and other interpretive errors are inadvertently transcribed by the computer software. Please disregard these errors. Please excuse any errors that have escaped final proofreading. Thank you.

## 2022-05-17 NOTE — PROGRESS NOTES
Pharmacy Medication Reconciliation     Pertinent Findings:   -clonazepam was filled recently (5/4/2022)  -liothyronine bottle is empty (filled 4/28/22 for 30 DS)    Clarified PTA med list with Sahra, patient's cousin. PTA medication list was corrected to the following:     Prior to Admission Medications   Prescriptions Last Dose Informant Taking? FLUoxetine (PROzac) 20 mg capsule  Family Member Yes   Sig: Take 60 mg by mouth daily. azaTHIOprine (IMURAN) 50 mg tablet  Family Member Yes   Sig: Take 50 mg by mouth daily. butalbital-aspirin-caffeine (FIORINAL) -40 mg tablet  Family Member Yes   Sig: Take 1 Tablet by mouth two (2) times daily as needed for Headache.   clonazePAM (KlonoPIN) 0.5 mg tablet  Family Member Yes   Sig: Take 0.5 mg by mouth daily. furosemide (LASIX) 40 mg tablet  Family Member Yes   Sig: Take 40 mg by mouth daily. hydrOXYchloroQUINE (PLAQUENIL) 200 mg tablet  Family Member Yes   Sig: Take 200 mg by mouth daily. levothyroxine (SYNTHROID) 175 mcg tablet  Family Member Yes   Sig: Take 175 mcg by mouth Daily (before breakfast). liothyronine (CYTOMEL) 5 mcg tablet  Family Member Yes   Sig: Take 5 mcg by mouth daily. promethazine (PHENERGAN) 6.25 mg/5 mL syrup  Family Member Yes   Sig: Take 12.5 mg by mouth two (2) times daily as needed for Nausea. rosuvastatin (CRESTOR) 20 mg tablet  Family Member Yes   Sig: Take 20 mg by mouth daily. sucralfate (CARAFATE) 1 gram tablet  Family Member Yes   Sig: Take 1 g by mouth Before breakfast and dinner. tiZANidine (ZANAFLEX) 4 mg tablet  Family Member Yes   Sig: Take 4 mg by mouth nightly. vortioxetine (Trintellix) 10 mg tablet  Family Member Yes   Sig: Take 10 mg by mouth daily.       Facility-Administered Medications: None        Thank you,  Elvis Rivera, STUD

## 2022-05-17 NOTE — PROGRESS NOTES
Hospitalist Progress Note    NAME: Elmira Benson   :  1960   MRN:  [id number]     Admit date: 2022    Today's date: 22    PCP: Yesenia, MD Chelsea     Called to see pt , transferring out of the ICU    Anticipated discharge date: 2022    Barriers:      Assessment / Plan:    Hypovolemic shock POA initial BP 63/40, improved with IVF  Acute blood loss anemia POA Hgb  Peptic ulcer disease POA  - Reportedly vomited blood PTA, hypotensive in ED  - S/p 2 L IVF in ED  - Given 1 unit PRBCs  - IV protonix  - 2 large bore IVs  - [facility] following  - EGD    Esophagus:             Lower 1/3 of esophagus with scarred appearing mucosa with some friability seen extending from 30-33 cm from incisors. Slight oozing but no mucosal tear seen. This appeared c/w partially healed LA Grade B erosive esophagitis. No bx performed. There was no other nodularity seen or mucosal lesions in esophagus. There was a moderate sized hiatal hernia with GEJ located at 40 cm form incisors. Stomach:               Normal mucosa throughout the stomach that appeared cylindrical shaped in keeping with prior Gastric sleeve. No ulcers in stomach. Pylorus patent. Duodenum:             Normal mucosa to second portion. - clears  - Hg 10.3 --> 8.3 --> 6.5, given 1 unit PRBC --> 7.4 --> 7.8 --> 9.1  - Transfusion goal Hg>7  - Serial HgBs  - Avoid NSAIDs  - Start discharge planning in AM     Bradycardia  Hypothyroidism  -TSH 0.10  -Home synthroid reduced from 175 mcg to 150 mcg     COPD  -duoneb Q 6 hr  -budesonide neb Q 12     Schizoaffective disorder POA  Recent admit for clonopine/suboxone overdose in [address] [place] [address] several months ago  Chronic pain syndrome  Tylenol  Reports taking neurontin, will add back    History of gastric       Body mass index is 22.03 kg/m².     Code status: Full  Prophylaxis: SCD's  Recommended Disposition: Home w/Family    Subjective: Chief Complaint / Reason for Physician Visit  \"I am hurting all over\". Discussed with RN events overnight. Complaints of chronic back, joint pain  No further bleeding per NS  EGD today  Pt asking for clear liquids     Review of Systems:  Symptom Y/N Comments  Symptom Y/N Comments   Fever/Chills n   Chest Pain n    Poor Appetite    Edema     Cough n   Abdominal Pain n    Sputum    Joint Pain y    SOB/HILL n   Headache     Nausea/vomit n   Tolerating PT/OT     Diarrhea    Tolerating Diet y    Constipation    Other       Could NOT obtain due to:      Objective:     VITALS:   Last 24hrs VS reviewed since prior progress note.  Most recent are:  Patient Vitals for the past 24 hrs:   Temp Pulse Resp BP SpO2   05/17/22 1600 -- 85 21 (!) 154/69 --   05/17/22 1345 -- -- -- (!) 172/72 --   05/17/22 1253 -- 85 22 (!) 172/72 100 %   05/17/22 1250 -- 95 18 (!) 172/72 98 %   05/17/22 1248 -- 94 24 (!) 172/72 100 %   05/17/22 1209 -- 81 18 (!) 170/65 95 %   05/17/22 1100 -- 83 20 (!) 143/64 --   05/17/22 1000 -- 87 18 (!) 120/57 --   05/17/22 0900 -- 83 18 129/75 --   05/17/22 0800 97.8 °F (36.6 °C) 83 14 117/61 --   05/17/22 0713 -- -- -- -- 99 %   05/17/22 0700 -- 70 17 (!) 111/54 97 %   05/17/22 0605 -- 68 21 112/74 --   05/17/22 0535 -- 70 20 (!) 137/121 100 %   05/17/22 0520 97.8 °F (36.6 °C) 72 22 (!) 153/58 96 %   05/17/22 0505 -- 89 28 (!) 115/58 --   05/17/22 0450 -- 70 25 (!) 110/99 97 %   05/17/22 0435 -- 72 20 104/88 --   05/17/22 0420 -- 74 15 101/74 95 %   05/17/22 0405 -- 63 18 (!) 126/51 96 %   05/17/22 0350 -- (!) 57 17 (!) 137/50 99 %   05/17/22 0305 -- 70 -- -- --   05/17/22 0240 97.8 °F (36.6 °C) 62 19 111/61 100 %   05/17/22 0225 -- 67 14 107/63 100 %   05/17/22 0215 -- 63 19 94/60 --   05/17/22 0210 -- (!) 59 20 (!) 100/49 93 %   05/17/22 0200 -- 62 15 (!) 88/27 96 %   05/17/22 0145 -- (!) 57 16 (!) 89/33 95 %   05/17/22 0130 -- (!) 56 22 (!) 80/68 97 %   05/17/22 0115 -- (!) 53 18 (!) 81/49 99 % 05/17/22 0100 -- (!) 55 16 (!) 91/53 --   05/17/22 0045 97.7 °F (36.5 °C) (!) 48 10 (!) 102/54 96 %   05/17/22 0030 97.8 °F (36.6 °C) (!) 52 14 (!) 102/55 99 %   05/16/22 2300 -- -- -- -- 100 %   05/16/22 2237 -- (!) 51 21 92/60 100 %   05/16/22 2207 -- (!) 50 13 (!) 90/50 91 %   05/16/22 2137 -- (!) 47 10 (!) 94/54 100 %   05/16/22 2122 -- 60 21 (!) 95/57 99 %   05/16/22 2037 -- (!) 52 12 (!) 82/52 100 %   05/16/22 2007 -- (!) 58 13 (!) 83/48 100 %   05/16/22 1922 -- (!) 58 15 (!) 76/63 100 %   05/16/22 1907 -- (!) 56 17 (!) 63/40 100 %       Intake/Output Summary (Last 24 hours) at 5/17/2022 1830  Last data filed at 5/17/2022 1600  Gross per 24 hour   Intake 3280 ml   Output 1100 ml   Net 2180 ml        Wt Readings from Last 12 Encounters:   05/17/22 56.4 kg (124 lb 5.4 oz)   05/16/22 55.7 kg (122 lb 12.8 oz)   05/10/22 59.9 kg (132 lb)       PHYSICAL EXAM:    I had a face to face encounter and independently examined this patient on 05/17/22 as outlined below:    General: WD, WN. Alert, cooperative, no acute distress    EENT:  PERRL. Anicteric sclerae. MMM  Resp:  Rhonchi bilaterally, no wheezing or rales. No accessory muscle use  CV:  Regular  rhythm,  No edema  GI:  Soft, Non distended, Non tender. +Bowel sounds, no rebound  Neurologic:  Alert and oriented X 3, normal speech, non focal motor exam  Psych:   Good insight. Not anxious nor agitated  Skin:  No rashes.   No jaundice    Reviewed most current lab test results and cultures  YES  Reviewed most current radiology test results   YES  Review and summation of old records today    NO  Reviewed patient's current orders and MAR    YES  PMH/SH reviewed - no change compared to H&P  ________________________________________________________________________  Care Plan discussed with:    Comments   Patient y    Family      RN y    Care Manager     Consultant                        Multidiciplinary team rounds were held today with , nursing, pharmacist and clinical coordinator. Patient's plan of care was discussed; medications were reviewed and discharge planning was addressed. ________________________________________________________________________      Comments   >50% of visit spent in counseling and coordination of care     ________________________________________________________________________  Sierra Bangura MD     Procedures: see electronic medical records for all procedures/Xrays and details which were not copied into this note but were reviewed prior to creation of Plan. LABS:  I reviewed today's most current labs and imaging studies.   Pertinent labs include:  Recent Labs     05/17/22  1522 05/17/22  0819 05/17/22  0327   WBC 7.5 7.7 7.5   HGB 9.1* 7.8* 7.4*   HCT 29.6* 25.1* 24.6*   * 402* 520*     Recent Labs     05/17/22  1522 05/17/22  0327 05/16/22  1657   NA  --  137 133*   K  --  3.4* 3.3*   CL  --  110* 101   CO2  --  21 22   GLU  --  71 101*   BUN  --  16 19   CREA  --  0.74 0.96   CA  --  8.0* 9.1   MG  --  1.9  --    PHOS  --  2.9  --    ALB  --   --  3.0*   TBILI  --   --  0.2   ALT  --   --  18   INR 1.0  --   --

## 2022-05-17 NOTE — PROGRESS NOTES
Critical Care Progress Note  Layo Merchant MD          Date of Service:  2022  NAME:  Calos Morse  :  1960  MRN:  [id number]      Subjective/Hospital course:      : Patient is asking for pain medicines and c/o pain all over her body and abd pain. She is going to get EGD today. Received one U of blood overnight. Problem list:     Problem list:  Hospital Problems  Date Reviewed: 2022          Codes Class Noted POA    Hematemesis ICD-10-CM: K92.0  ICD-9-CM: 578.0  2022 Unknown        Iron deficiency anemia ICD-10-CM: D50.9  ICD-9-CM: 280.9  2022 Yes        GI bleed ICD-10-CM: K92.2  ICD-9-CM: 578.9  2022 Unknown              Assessment/Plan:     Hypovolemic/hemorrhagic shock  Acute post hemorrhagic anemia  Peptic ulcer disease  -s/p 2.5 L IVF oevernight.  -protonix  40mg BID  -2 large bore IVs  -Plan for EGD this am   -NPO  -Hg 10.3-->8.3-->6.5, given 1 unit PRBC  -transfusion goal Hg>7  -CBC Q 4 hr        Bradycardia  PMH of hypothyroidism  -TSH is low, check FT4 level.  -cont home synthroid     PMH of COPD  -duoneb Q 6 hr  -budesonide neb Q 12    Pain seeking behavior and schizoaffective disorider.  -There is discripancy in the psych meds that are reports vs filled. H/o admission due to ?klonopin overdose.  -Consult psychiatry. Code status: Full code. DVT prophylaxis: SCD. AC contraindicated. Care Plan discussed with: Patient/Family and Nurse  Disposition: TBD    I personally spent 40 minutes of critical care time. This is time spent at this critically ill patient's bedside actively involved in patient care as well as the coordination of care and discussions with the patient's family. This does not include any procedural time which has been billed separately. Review of Systems:   A comprehensive review of systems was negative.        Vital Signs:     Patient Vitals for the past 4 hrs:   Pulse   22 0800 83        Intake/Output Summary (Last 24 hours) at 5/17/2022 1159  Last data filed at 5/17/2022 0700  Gross per 24 hour   Intake 3280 ml   Output 700 ml   Net 2580 ml        Physical Examination:    Physical Exam  Constitutional:       Appearance: Normal appearance. HENT:      Head: Normocephalic and atraumatic. Mouth/Throat:      Mouth: Mucous membranes are moist.   Eyes:      Extraocular Movements: Extraocular movements intact. Conjunctiva/sclera: Conjunctivae normal.   Cardiovascular:      Rate and Rhythm: Normal rate and regular rhythm. Pulmonary:      Effort: Pulmonary effort is normal. No respiratory distress. Breath sounds: Normal breath sounds. Abdominal:      General: Abdomen is flat. There is no distension. Palpations: Abdomen is soft. Tenderness: There is no abdominal tenderness. There is no guarding. Musculoskeletal:      Cervical back: Normal range of motion and neck supple. Neurological:      Mental Status: She is alert and oriented to person, place, and time. Labs:   Labs and imaging reviewed.       Medications:     Current Facility-Administered Medications   Medication Dose Route Frequency    nicotine (NICODERM CQ) 21 mg/24 hr patch 1 Patch  1 Patch TransDERmal DAILY    alcohol 62% (NOZIN) nasal  1 Ampule  1 Ampule Topical Q12H    ondansetron (ZOFRAN) injection 4 mg  4 mg IntraVENous Q6H    pantoprazole (PROTONIX) 40 mg in 0.9% sodium chloride 10 mL injection  40 mg IntraVENous Q12H    albuterol-ipratropium (DUO-NEB) 2.5 MG-0.5 MG/3 ML  3 mL Nebulization Q6H RT    levothyroxine (SYNTHROID) tablet 150 mcg  150 mcg Oral 6am    budesonide (PULMICORT) 250 mcg/2ml nebulizer susp  250 mcg Nebulization BID RT    sodium chloride (NS) flush 5-40 mL  5-40 mL IntraVENous Q8H    sodium chloride (NS) flush 5-40 mL  5-40 mL IntraVENous PRN    ondansetron (ZOFRAN ODT) tablet 4 mg  4 mg Oral Q8H PRN    Or    ondansetron (ZOFRAN) injection 4 mg  4 mg IntraVENous Q6H PRN    acetaminophen (TYLENOL) tablet 1,000 mg  1,000 mg Oral Q6H PRN    0.9% sodium chloride infusion 250 mL  250 mL IntraVENous PRN     ______________________________________________________________________  EXPECTED LENGTH OF STAY: - - -  ACTUAL LENGTH OF STAY:          [address] Abad, MD   Pulmonary/CCM  [address]  [phone]  5/17/2022

## 2022-05-17 NOTE — ANESTHESIA PREPROCEDURE EVALUATION
Relevant Problems   HEMATOLOGY   (+) Iron deficiency anemia       Anesthetic History     PONV          Review of Systems / Medical History  Patient summary reviewed, nursing notes reviewed and pertinent labs reviewed    Pulmonary                Comments: Former Smoker   Neuro/Psych   Within defined limits           Cardiovascular  Within defined limits                  Comments: ECG (5/16/22): Sinus bradycardia   Possible Left atrial enlargement   No previous ECGs available   GI/Hepatic/Renal               Comments: Hematemesis    Hx Gastric sleeve (2013) and revision (2020) Endo/Other        Arthritis and anemia (Hgb=7.8 on 5/17/22)    Comments: S/P Bilateral shoulder arthroplasties Other Findings   Comments: Systemic Lupus Erythematosus    Osteoporosis    Sarcoidosis         Physical Exam    Airway  Mallampati: II  TM Distance: > 6 cm  Neck ROM: normal range of motion   Mouth opening: Normal     Cardiovascular  Regular rate and rhythm,  S1 and S2 normal,  no murmur, click, rub, or gallop             Dental    Dentition: Poor dentition  Comments: Upper - edentulous, Lower - few remaining teeth, all broken of at gum line with severe decay.    Pulmonary  Breath sounds clear to auscultation               Abdominal  GI exam deferred       Other Findings            Anesthetic Plan    ASA: 3  Anesthesia type: MAC          Induction: Intravenous  Anesthetic plan and risks discussed with: Patient

## 2022-05-17 NOTE — PROGRESS NOTES
0510: Patient was out of bed, pulled out telemetry cables and BP cuff. Patient redirected back into bed by staff and bed alarm turned on at this time.

## 2022-05-17 NOTE — CONSULTS
Gastroenterology Consultation Note  SHANNON You   for Dr. Perez    NAME: Ene Leon : 1960 MRN: [id number]   ATTG: Dr. Sana Garcia PCP: Chelsea Patterson MD  Date/Time:  2022 7:58 AM  Subjective:   REASON FOR CONSULT:      Suzan Nunn is a 62 y.o.  female who I was asked to see for hematemesis. PMHx significant for Lupus on plaquel, osteoparosis, depression, and chronic JANE. She reports she has a hx of ulcerative esophagitis. Underwent EGD on 22 by Dr. Johann Hare in [place], [place]. Results and path not available however can see encounter in [facility]. She reports they were also to do a manometry however her esophagus was too ulcerated to complete. She was to increase her Omeprazole to BID but she never did as she came down to  [facility] to  with her Aunt who is in hospice. Pt is very tangential with her hx. She reports she has been vomiting fairly regularly since EGD but not daily and reports it to be bilious emesis. Yesterday started with hematemesis, unable to quantify how much to me. She reports friends were caring for her as she was previously in a SNF. She reports she is not to use NSAIDs but is taking Toradol for pain. Admits to umbilical abdominal pain described as burning. She has continued to request narcotic pain meds. She reports hx of gastric sleeve in  and required a revision in  with mesh placement. She reports it has since, \"\". She reports she was to have joint replacement surgery and given Xarelto to take prophylactically but has not started it yet. Denies any other AC meds. She also reports she was to get an iron infusion but unable to complete prior to her leaving [place]. She was to do a bone marrow biopsy but really did not want to complete due to fear of pain and therefore not done. Hgb on presentation was 8.3 down to 6.5, this AM improved to 7.4then 7.8. Given one unit of blood.   Plt stable, WBC WNL, K 3.4, BUN and Cr stable. Normal LFTs and lipase. CT head neg. EGD 1/2020 from [address] [place] [address] Loop noted gastric ulcer with normal esphagus, normal duodenum. Path not available. She denies any pertinent family hx. She also denies any alcohol use, vapes and denies any illicit substance use. There are reports in her care everywhere chart to the contrary. Past Medical History:   Diagnosis Date    Arthritis     Iron deficiency     Lupus ([address]     Osteoporosis     Sarcoidosis       Past Surgical History:   Procedure Laterality Date    HX HERNIA REPAIR  2013    needs a repat she says     Social History     Tobacco Use    Smoking status: Former Smoker     Types: Cigarettes    Smokeless tobacco: Never Used   Substance Use Topics    Alcohol use: Never      No family history on file.    Allergies   Allergen Reactions    Haldol [Haloperidol Lactate] Anaphylaxis    Risperidone Anaphylaxis    Clarithromycin Other (comments)     Urticaria      Home Medications:  None     Hospital medications:  Current Facility-Administered Medications   Medication Dose Route Frequency    nicotine (NICODERM CQ) 21 mg/24 hr patch 1 Patch  1 Patch TransDERmal DAILY    alcohol 62% (NOZIN) nasal  1 Ampule  1 Ampule Topical Q12H    pantoprazole (PROTONIX) 40 mg in 0.9% sodium chloride 10 mL injection  40 mg IntraVENous Q12H    albuterol-ipratropium (DUO-NEB) 2.5 MG-0.5 MG/3 ML  3 mL Nebulization Q6H RT    levothyroxine (SYNTHROID) tablet 150 mcg  150 mcg Oral 6am    budesonide (PULMICORT) 250 mcg/2ml nebulizer susp  250 mcg Nebulization BID RT    sodium chloride (NS) flush 5-40 mL  5-40 mL IntraVENous Q8H    sodium chloride (NS) flush 5-40 mL  5-40 mL IntraVENous PRN    ondansetron (ZOFRAN ODT) tablet 4 mg  4 mg Oral Q8H PRN    Or    ondansetron (ZOFRAN) injection 4 mg  4 mg IntraVENous Q6H PRN    acetaminophen (TYLENOL) tablet 1,000 mg  1,000 mg Oral Q6H PRN    0.9% sodium chloride infusion 250 mL  250 mL IntraVENous PRN     REVIEW OF SYSTEMS:     []     Unable to obtain  ROS due to  []    mental status change  []    sedated   []    intubated  Review of Systems -   History obtained from the patient  General ROS: negative for - chills or fever  Psychological ROS: positive for - anxiety and depression  Hematological and Lymphatic ROS: positive for - blood transfusions  Respiratory ROS: no cough, shortness of breath, or wheezing  Cardiovascular ROS: no chest pain or dyspnea on exertion  Gastrointestinal ROS: see HPI  Genito-Urinary ROS: no dysuria, trouble voiding, or hematuria  Musculoskeletal ROS: Positive for - joint pain  Neurological ROS: no TIA or stroke symptoms, +AMS  Dermatological ROS: negative for - rash    Objective:   VITALS:    Visit Vitals  /74   Pulse 68   Temp 97.8 °F (36.6 °C)   Resp 21   Ht 5' 3\" (1.6 m)   Wt 56.4 kg (124 lb 5.4 oz)   SpO2 99%   BMI 22.03 kg/m²     Temp (24hrs), Av.9 °F (36.6 °C), Min:97.7 °F (36.5 °C), Max:98.1 °F (36.7 °C)    PHYSICAL EXAM:   General:    Alert, somewhat cooperative, no distress, appears older than stated age. Head:   Normocephalic, without obvious abnormality, atraumatic. Eyes:   Conjunctivae clear, anicteric sclerae. Pupils are equal  Nose:  Nares normal. No drainage or sinus tenderness. Throat:    Lips, mucosa, and tongue normal.  No Thrush  Neck:  Supple, symmetrical,  no adenopathy, thyroid: non tender. Lungs:   CTA bilaterally. No wheezing/rhonchi/rales. Heart:   Regular rate and rhythm,  no murmur, rub or gallop. Abdomen:   Soft, epigastric tenderness without guarding or rebound. Not distended. Bowel sounds normal. No masses. No hepatosplenomegaly. Rectal:  Deferred   Extremities: No cyanosis. No edema. No clubbing  Skin:     Texture, turgor normal. No rashes/lesions/jaundice  Lymph: Cervical, supraclavicular normal.  Psych:   Poor insight. Not depressed. Not anxious or agitated. Neurologic: EOMs intact. No facial asymmetry.  No aphasia or slurred speech normal strength, A/O X 3. LAB DATA REVIEWED:    Lab Results   Component Value Date/Time    WBC 7.5 05/17/2022 03:27 AM    HGB 7.4 (L) 05/17/2022 03:27 AM    HCT 24.6 (L) 05/17/2022 03:27 AM    PLATELET 539 (H) 87/96/9418 03:27 AM    MCV 93.9 05/17/2022 03:27 AM     Lab Results   Component Value Date/Time    ALT (SGPT) 18 05/16/2022 04:57 PM    Alk. phosphatase 77 05/16/2022 04:57 PM    Bilirubin, total 0.2 05/16/2022 04:57 PM     Lab Results   Component Value Date/Time    Sodium 137 05/17/2022 03:27 AM    Potassium 3.4 (L) 05/17/2022 03:27 AM    Chloride 110 (H) 05/17/2022 03:27 AM    CO2 21 05/17/2022 03:27 AM    Anion gap 6 05/17/2022 03:27 AM    Glucose 71 05/17/2022 03:27 AM    BUN 16 05/17/2022 03:27 AM    Creatinine 0.74 05/17/2022 03:27 AM    BUN/Creatinine ratio 22 (H) 05/17/2022 03:27 AM    GFR est AA >60 05/17/2022 03:27 AM    GFR est non-AA >60 05/17/2022 03:27 AM    Calcium 8.0 (L) 05/17/2022 03:27 AM     Lab Results   Component Value Date/Time    Lipase 109 05/16/2022 07:16 PM     No results found for: INR, PTMR, PTP, PT1, PT2, INREXT    IMAGING RESULTS:   []      I have personally reviewed the actual   []    CXR  []    CT  []     US    Impression:  Active Problems:    Iron deficiency anemia (5/16/2022)      GI bleed (5/16/2022)      Hematemesis (5/17/2022)       Plan:   Patient with reports of hematemesis yesterday, no further witness episodes since arrival.  She was hypotensive and anemic on presentation that improved with IVF and a unit of blood. She had been vomiting for days prior as well. Recommend proceeding with EGD today, appears stable to go down to endo if anesthesia agrees. Procedure reviewed with patient and she is agreeable with proceeding. Nausea meds ordered as scheduled rather than PRN, defer pain management to primary care team, concerned for drug seeking behaviors.   Was able to geet in touch with GI practice in [place], faxing procedure and path reports to [facility] and awaiting MD to call back to review case.        ___________________________________________________  Care Plan discussed with:    [x]    Patient   []    Family   [x]    Nursing   []    Attending  Total Time :  45  minutes   ___________________________________________________  GI: SHANNON Salomon

## 2022-05-17 NOTE — PROGRESS NOTES
Endoscope was pre-cleaned at the bedside immediately following procedure by Jamshid Luna ET    Medications     glycopyrrolate 0.2 mg/mL (mg)     Date/Time Rate/Dose/Volume Action Route Admin User Audit       05/17/22  1238 0.2 mg Given IntraVENous Adam Wood CRNA            lidocaine (PF) 2% (mg)     Date/Time Rate/Dose/Volume Action Route Admin User Audit       05/17/22  1241 100 mg Given IntraVENous Adam Wood CRNA            propofol 10 mg/mL (mg)     Date/Time Rate/Dose/Volume Action Route Admin User Audit       05/17/22  1241 100 mg Given IntraVENous [place], [facility], CRNA       1243 50 mg Given IntraVENous Paula Badillo CRNA            0.9% sodium chloride infusion (mL/hr) Dosing weight:  56.4    Date/Time Rate/Dose/Volume Action Route Admin User Audit       05/17/22  1235 25 mL/hr Rate Verify IntraVENous Paula Badillo CRNA edited               .

## 2022-05-18 LAB
ABO + RH BLD: NORMAL
ANION GAP SERPL CALC-SCNC: 7 MMOL/L (ref 5–15)
BASOPHILS # BLD: 0.1 K/UL (ref 0–0.1)
BASOPHILS NFR BLD: 1 % (ref 0–1)
BLD PROD TYP BPU: NORMAL
BLOOD GROUP ANTIBODIES SERPL: NORMAL
BPU ID: NORMAL
BUN SERPL-MCNC: 4 MG/DL (ref 6–20)
BUN/CREAT SERPL: 8 (ref 12–20)
CALCIUM SERPL-MCNC: 8.5 MG/DL (ref 8.5–10.1)
CHLORIDE SERPL-SCNC: 111 MMOL/L (ref 97–108)
CO2 SERPL-SCNC: 24 MMOL/L (ref 21–32)
CREAT SERPL-MCNC: 0.52 MG/DL (ref 0.55–1.02)
CROSSMATCH RESULT,%XM: NORMAL
DIFFERENTIAL METHOD BLD: ABNORMAL
EOSINOPHIL # BLD: 0.1 K/UL (ref 0–0.4)
EOSINOPHIL NFR BLD: 1 % (ref 0–7)
ERYTHROCYTE [DISTWIDTH] IN BLOOD BY AUTOMATED COUNT: 15.5 % (ref 11.5–14.5)
GLUCOSE BLD STRIP.AUTO-MCNC: 120 MG/DL (ref 65–117)
GLUCOSE BLD STRIP.AUTO-MCNC: 81 MG/DL (ref 65–117)
GLUCOSE SERPL-MCNC: 81 MG/DL (ref 65–100)
HCT VFR BLD AUTO: 28.6 % (ref 35–47)
HGB BLD-MCNC: 9 G/DL (ref 11.5–16)
IMM GRANULOCYTES # BLD AUTO: 0.1 K/UL (ref 0–0.04)
IMM GRANULOCYTES NFR BLD AUTO: 1 % (ref 0–0.5)
INR PPP: 1 (ref 0.9–1.1)
LYMPHOCYTES # BLD: 1.3 K/UL (ref 0.8–3.5)
LYMPHOCYTES NFR BLD: 22 % (ref 12–49)
MAGNESIUM SERPL-MCNC: 2.1 MG/DL (ref 1.6–2.4)
MCH RBC QN AUTO: 28.1 PG (ref 26–34)
MCHC RBC AUTO-ENTMCNC: 31.5 G/DL (ref 30–36.5)
MCV RBC AUTO: 89.4 FL (ref 80–99)
MONOCYTES # BLD: 0.6 K/UL (ref 0–1)
MONOCYTES NFR BLD: 10 % (ref 5–13)
NEUTS SEG # BLD: 3.8 K/UL (ref 1.8–8)
NEUTS SEG NFR BLD: 65 % (ref 32–75)
NRBC # BLD: 0 K/UL (ref 0–0.01)
NRBC BLD-RTO: 0 PER 100 WBC
PHOSPHATE SERPL-MCNC: 2.7 MG/DL (ref 2.6–4.7)
PLATELET # BLD AUTO: 635 K/UL (ref 150–400)
PMV BLD AUTO: 8.1 FL (ref 8.9–12.9)
POTASSIUM SERPL-SCNC: 3.4 MMOL/L (ref 3.5–5.1)
PROTHROMBIN TIME: 10.8 SEC (ref 9–11.1)
RBC # BLD AUTO: 3.2 M/UL (ref 3.8–5.2)
RBC MORPH BLD: ABNORMAL
SERVICE CMNT-IMP: ABNORMAL
SERVICE CMNT-IMP: NORMAL
SODIUM SERPL-SCNC: 142 MMOL/L (ref 136–145)
SPECIMEN EXP DATE BLD: NORMAL
STATUS OF UNIT,%ST: NORMAL
UNIT DIVISION, %UDIV: 0
WBC # BLD AUTO: 6 K/UL (ref 3.6–11)

## 2022-05-18 PROCEDURE — 84100 ASSAY OF PHOSPHORUS: CPT

## 2022-05-18 PROCEDURE — 74011250637 HC RX REV CODE- 250/637: Performed by: HOSPITALIST

## 2022-05-18 PROCEDURE — 85025 COMPLETE CBC W/AUTO DIFF WBC: CPT

## 2022-05-18 PROCEDURE — 94640 AIRWAY INHALATION TREATMENT: CPT

## 2022-05-18 PROCEDURE — 74011250637 HC RX REV CODE- 250/637: Performed by: SPECIALIST

## 2022-05-18 PROCEDURE — 82962 GLUCOSE BLOOD TEST: CPT

## 2022-05-18 PROCEDURE — 74011000250 HC RX REV CODE- 250: Performed by: NURSE PRACTITIONER

## 2022-05-18 PROCEDURE — 65270000046 HC RM TELEMETRY

## 2022-05-18 PROCEDURE — 83735 ASSAY OF MAGNESIUM: CPT

## 2022-05-18 PROCEDURE — 74011250636 HC RX REV CODE- 250/636: Performed by: PHYSICIAN ASSISTANT

## 2022-05-18 PROCEDURE — 74011250637 HC RX REV CODE- 250/637: Performed by: NURSE PRACTITIONER

## 2022-05-18 PROCEDURE — 85610 PROTHROMBIN TIME: CPT

## 2022-05-18 PROCEDURE — 74011250636 HC RX REV CODE- 250/636: Performed by: NURSE PRACTITIONER

## 2022-05-18 PROCEDURE — 74011250637 HC RX REV CODE- 250/637: Performed by: INTERNAL MEDICINE

## 2022-05-18 PROCEDURE — C9113 INJ PANTOPRAZOLE SODIUM, VIA: HCPCS | Performed by: NURSE PRACTITIONER

## 2022-05-18 PROCEDURE — 80048 BASIC METABOLIC PNL TOTAL CA: CPT

## 2022-05-18 PROCEDURE — 36415 COLL VENOUS BLD VENIPUNCTURE: CPT

## 2022-05-18 PROCEDURE — 74011000250 HC RX REV CODE- 250: Performed by: INTERNAL MEDICINE

## 2022-05-18 RX ORDER — LIOTHYRONINE SODIUM 5 UG/1
5 TABLET ORAL DAILY
Status: DISCONTINUED | OUTPATIENT
Start: 2022-05-19 | End: 2022-05-28 | Stop reason: HOSPADM

## 2022-05-18 RX ORDER — HYDROXYCHLOROQUINE SULFATE 200 MG/1
200 TABLET, FILM COATED ORAL DAILY
Status: DISCONTINUED | OUTPATIENT
Start: 2022-05-19 | End: 2022-05-28 | Stop reason: HOSPADM

## 2022-05-18 RX ORDER — FUROSEMIDE 40 MG/1
40 TABLET ORAL DAILY
Status: DISCONTINUED | OUTPATIENT
Start: 2022-05-19 | End: 2022-05-28 | Stop reason: HOSPADM

## 2022-05-18 RX ORDER — TRAZODONE HYDROCHLORIDE 50 MG/1
25 TABLET ORAL
Status: DISCONTINUED | OUTPATIENT
Start: 2022-05-18 | End: 2022-05-28 | Stop reason: HOSPADM

## 2022-05-18 RX ORDER — HYDROCODONE BITARTRATE AND ACETAMINOPHEN 5; 325 MG/1; MG/1
1 TABLET ORAL
Status: DISCONTINUED | OUTPATIENT
Start: 2022-05-18 | End: 2022-05-23

## 2022-05-18 RX ORDER — IPRATROPIUM BROMIDE AND ALBUTEROL SULFATE 2.5; .5 MG/3ML; MG/3ML
3 SOLUTION RESPIRATORY (INHALATION)
Status: DISCONTINUED | OUTPATIENT
Start: 2022-05-18 | End: 2022-05-28 | Stop reason: HOSPADM

## 2022-05-18 RX ORDER — CLONAZEPAM 1 MG/1
1 TABLET ORAL
Status: DISCONTINUED | OUTPATIENT
Start: 2022-05-18 | End: 2022-05-20

## 2022-05-18 RX ORDER — POTASSIUM CHLORIDE 750 MG/1
20 TABLET, FILM COATED, EXTENDED RELEASE ORAL
Status: COMPLETED | OUTPATIENT
Start: 2022-05-18 | End: 2022-05-18

## 2022-05-18 RX ADMIN — SODIUM CHLORIDE 40 MG: 9 INJECTION, SOLUTION INTRAMUSCULAR; INTRAVENOUS; SUBCUTANEOUS at 18:15

## 2022-05-18 RX ADMIN — POTASSIUM CHLORIDE 20 MEQ: 750 TABLET, EXTENDED RELEASE ORAL at 12:36

## 2022-05-18 RX ADMIN — ONDANSETRON HYDROCHLORIDE 4 MG: 2 INJECTION, SOLUTION INTRAMUSCULAR; INTRAVENOUS at 09:29

## 2022-05-18 RX ADMIN — Medication 1 AMPULE: at 08:55

## 2022-05-18 RX ADMIN — CLONAZEPAM 1 MG: 1 TABLET ORAL at 20:42

## 2022-05-18 RX ADMIN — ACETAMINOPHEN 1000 MG: 500 TABLET ORAL at 00:50

## 2022-05-18 RX ADMIN — SUCRALFATE 1 G: 1 TABLET ORAL at 12:36

## 2022-05-18 RX ADMIN — BUDESONIDE 250 MCG: 0.25 INHALANT RESPIRATORY (INHALATION) at 20:56

## 2022-05-18 RX ADMIN — ACETAMINOPHEN 1000 MG: 500 TABLET ORAL at 06:44

## 2022-05-18 RX ADMIN — SODIUM CHLORIDE, PRESERVATIVE FREE 10 ML: 5 INJECTION INTRAVENOUS at 06:44

## 2022-05-18 RX ADMIN — GABAPENTIN 300 MG: 300 CAPSULE ORAL at 08:51

## 2022-05-18 RX ADMIN — GABAPENTIN 300 MG: 300 CAPSULE ORAL at 18:15

## 2022-05-18 RX ADMIN — IPRATROPIUM BROMIDE AND ALBUTEROL SULFATE 3 ML: .5; 3 SOLUTION RESPIRATORY (INHALATION) at 02:29

## 2022-05-18 RX ADMIN — FLUOXETINE HYDROCHLORIDE 60 MG: 20 CAPSULE ORAL at 08:51

## 2022-05-18 RX ADMIN — SODIUM CHLORIDE, PRESERVATIVE FREE 10 ML: 5 INJECTION INTRAVENOUS at 12:39

## 2022-05-18 RX ADMIN — SODIUM CHLORIDE 40 MG: 9 INJECTION, SOLUTION INTRAMUSCULAR; INTRAVENOUS; SUBCUTANEOUS at 06:44

## 2022-05-18 RX ADMIN — ACETAMINOPHEN 1000 MG: 500 TABLET ORAL at 12:36

## 2022-05-18 RX ADMIN — LEVOTHYROXINE SODIUM 150 MCG: 0.1 TABLET ORAL at 06:44

## 2022-05-18 RX ADMIN — HYDROCODONE BITARTRATE AND ACETAMINOPHEN 1 TABLET: 5; 325 TABLET ORAL at 16:44

## 2022-05-18 RX ADMIN — ONDANSETRON HYDROCHLORIDE 4 MG: 2 INJECTION, SOLUTION INTRAMUSCULAR; INTRAVENOUS at 20:00

## 2022-05-18 RX ADMIN — SUCRALFATE 1 G: 1 TABLET ORAL at 20:43

## 2022-05-18 RX ADMIN — ONDANSETRON HYDROCHLORIDE 4 MG: 2 INJECTION, SOLUTION INTRAMUSCULAR; INTRAVENOUS at 15:50

## 2022-05-18 RX ADMIN — SUCRALFATE 1 G: 1 TABLET ORAL at 06:45

## 2022-05-18 RX ADMIN — TRAZODONE HYDROCHLORIDE 25 MG: 50 TABLET ORAL at 20:00

## 2022-05-18 RX ADMIN — HYDROCODONE BITARTRATE AND ACETAMINOPHEN 1 TABLET: 5; 325 TABLET ORAL at 21:57

## 2022-05-18 RX ADMIN — SODIUM CHLORIDE, PRESERVATIVE FREE 10 ML: 5 INJECTION INTRAVENOUS at 23:13

## 2022-05-18 RX ADMIN — SUCRALFATE 1 G: 1 TABLET ORAL at 16:44

## 2022-05-18 NOTE — PROGRESS NOTES
End of Shift Note    Bedside shift change report given to Kwan Tavares (oncoming nurse) by Blanca Don (offgoing nurse). Report included the following information SBAR, Kardex, Intake/Output and MAR    Shift worked:  07a-7p     Shift summary and any significant changes:    Pt is not wanting to participate in care. Does not want to get out of bed   Demands for medication to be crushed   Demands for different medication other then what the hospital has prescribed based on her medical needs   Alert  X 4  Room air   Cardiac monitoring      Concerns for physician to address: none     Zone phone for oncoming shift:  None     Activity:  Activity Level: Up with Assistance  Number times ambulated in hallways past shift: 0  Number of times OOB to chair past shift: 1    Cardiac:   Cardiac Monitoring: Yes      Cardiac Rhythm: Sinus Rhythm    Access:   Current line(s): PIV     Genitourinary:   Urinary status: voiding    Respiratory:   O2 Device: None (Room air)  Chronic home O2 use?: YES  Incentive spirometer at bedside: YES       GI:  Last Bowel Movement Date:  (PTA)  Current diet:  ADULT DIET Clear Liquid  Passing flatus: YES  Tolerating current diet: YES       Pain Management:   Patient states pain is manageable on current regimen: YES    Skin:  Quan Score: 19  Interventions: turn team and foam dressing    Patient Safety:  Fall Score:  Total Score: 3  Interventions: bed/chair alarm and gripper socks  High Fall Risk: Yes    Length of Stay:  Expected LOS: 4d 9h  Actual LOS: [facility]

## 2022-05-18 NOTE — ANESTHESIA POSTPROCEDURE EVALUATION
Procedure(s):  ESOPHAGOGASTRODUODENOSCOPY (EGD).     MAC    Anesthesia Post Evaluation      Multimodal analgesia: multimodal analgesia used between 6 hours prior to anesthesia start to PACU discharge  Patient location during evaluation: bedside  Patient participation: complete - patient participated  Level of consciousness: awake  Pain management: adequate  Airway patency: patent  Anesthetic complications: no  Cardiovascular status: acceptable  Respiratory status: acceptable  Hydration status: acceptable  Post anesthesia nausea and vomiting:  controlled  Final Post Anesthesia Temperature Assessment:  Normothermia (36.0-37.5 degrees C)      INITIAL Post-op Vital signs:   Vitals Value Taken Time   /81 05/18/22 1100   Temp 36.8 °C (98.2 °F) 05/18/22 0800   Pulse 80 05/18/22 1200   Resp 18 05/18/22 1055   SpO2 99 % 05/18/22 1055

## 2022-05-18 NOTE — PROGRESS NOTES
Problem: Pressure Injury - Risk of  Goal: *Prevention of pressure injury  Description: Document Quan Scale and appropriate interventions in the flowsheet. Outcome: Progressing Towards Goal  Note: Pressure Injury Interventions:       Moisture Interventions: Absorbent underpads    Activity Interventions: Increase time out of bed    Mobility Interventions: HOB 30 degrees or less    Nutrition Interventions: Document food/fluid/supplement intake    Friction and Shear Interventions: HOB 30 degrees or less                Problem: Patient Education: Go to Patient Education Activity  Goal: Patient/Family Education  Outcome: Progressing Towards Goal     Problem: Falls - Risk of  Goal: *Absence of Falls  Description: Document Alfredo Fall Risk and appropriate interventions in the flowsheet.   Outcome: Progressing Towards Goal  Note: Fall Risk Interventions:  Mobility Interventions: Bed/chair exit alarm         Medication Interventions: Patient to call before getting OOB,Bed/chair exit alarm    Elimination Interventions: Patient to call for help with toileting needs              Problem: Patient Education: Go to Patient Education Activity  Goal: Patient/Family Education  Outcome: Progressing Towards Goal     Problem: Breathing Pattern - Ineffective  Goal: *Absence of hypoxia  Outcome: Progressing Towards Goal  Goal: *Use of effective breathing techniques  Outcome: Progressing Towards Goal     Problem: Patient Education: Go to Patient Education Activity  Goal: Patient/Family Education  Outcome: Progressing Towards Goal

## 2022-05-18 NOTE — PROGRESS NOTES
1900:Bedside shift change report given to Mercedes WHITTAKER (oncoming nurse) by Sachi Berg (offgoing nurse). Report included the following information SBAR, Kardex, Intake/Output, MAR, Recent Results and Cardiac Rhythm NSR.       2000: Assessment completed- see flow sheet. Ambulated patient to the bathroom voided w/o problem. Requested her klonopin. Klonopin not ordered until am but patient wants now. MD  Notified, klonopin changed to bedtime. Patient agreeable. Provided patient with chicken broth and tea. Sitter at the bedside with patient. 2100: Patient states she has pain in her knees, abdomen, and back. Patient requested pain medication. Patient was offered tylenol. Patient agreeable. 2200: Patient sleeping in bed. Bed alarm in place and sitter in the room. 0000: Reassessment completed- no changes. Patient assisted to the bathroom. Patient took tylenol for pain and went back to sleep. Bed alarm in place. 0540: Patient has slept most of the night. Patient woken up for lab work and 3rd shift assessment- no changes. Patient assisted to the bathroom back in bed with one person assist. complains of abdominal pain will medicate with tylenol. 0700:Bedside shift change report given to Cuauhtemoc Jacobo (oncoming nurse) by Mercedes WHITTAKER(offgoing nurse). Report included the following information SBAR, Kardex, Intake/Output, MAR, Recent Results, Med Rec Status, Cardiac Rhythm NSR  and Alarm Parameters .

## 2022-05-18 NOTE — PROGRESS NOTES
Nocturnist NP Note         2004- Notified by nursing the patient is requesting her Klonopin. Scheduled to start tomorrow morning, but patient is requesting tonight. Changed dosing to every bedtime, first dose tonight. Nursing explained to patient that she will not get another dose until tomorrow evening and the patient verbalized understanding. Please note that this note was dictated using Dragon computer voice recognition software. Quite often unanticipated grammatical, syntax, homophones, and other interpretive errors are inadvertently transcribed by the computer software. Please disregard these errors. Please excuse any errors that have escaped final proofreading.

## 2022-05-18 NOTE — PROGRESS NOTES
Gastroenterology Daily Progress Note   (Leeanna Ram PA-C for Dr. Bina Napier)   [facility]    Admit Date: 5/16/2022       Subjective:     Patient seen during morning rounds. She is resting with sitter at bedside. No new complaints. Hgb improved to 9 today    EGD by Dr. Bina Napier reviewed with patient:   Findings:      Esophagus:   +          Lower 1/3 of esophagus with scarred appearing mucosa with some friability seen extending from 30-33 cm from incisors. Slight oozing but no mucosal tear seen. This appeared c/w partially healed LA Grade B erosive esophagitis. No bx performed. There was no other nodularity seen or mucosal lesions in esophagus. +          There was a moderate sized hiatal hernia with GEJ located at 40 cm form incisors.       Stomach:   +          Normal mucosa throughout the stomach that appeared cylindrical shaped in keeping with prior Gastric sleeve. No ulcers in stomach. Pylorus patent.     Duodenum:   -           Normal mucosa to second portion.     Current Facility-Administered Medications   Medication Dose Route Frequency    nicotine (NICODERM CQ) 21 mg/24 hr patch 1 Patch  1 Patch TransDERmal DAILY    alcohol 62% (NOZIN) nasal  1 Ampule  1 Ampule Topical Q12H    ondansetron (ZOFRAN) injection 4 mg  4 mg IntraVENous Q6H    sucralfate (CARAFATE) tablet 1 g  1 g Oral AC&HS    FLUoxetine (PROzac) capsule 60 mg  60 mg Oral DAILY    gabapentin (NEURONTIN) capsule 300 mg  300 mg Oral BID    lidocaine 4 % patch 1 Patch  1 Patch TransDERmal Q24H    melatonin tablet 6 mg  6 mg Oral QHS    clonazePAM (KlonoPIN) tablet 0.5 mg  0.5 mg Oral QHS    pantoprazole (PROTONIX) 40 mg in 0.9% sodium chloride 10 mL injection  40 mg IntraVENous Q12H    albuterol-ipratropium (DUO-NEB) 2.5 MG-0.5 MG/3 ML  3 mL Nebulization Q6H RT    levothyroxine (SYNTHROID) tablet 150 mcg  150 mcg Oral 6am    budesonide (PULMICORT) 250 mcg/2ml nebulizer susp  250 mcg Nebulization BID RT    sodium chloride (NS) flush 5-40 mL  5-40 mL IntraVENous Q8H    sodium chloride (NS) flush 5-40 mL  5-40 mL IntraVENous PRN    ondansetron (ZOFRAN ODT) tablet 4 mg  4 mg Oral Q8H PRN    Or    ondansetron (ZOFRAN) injection 4 mg  4 mg IntraVENous Q6H PRN    acetaminophen (TYLENOL) tablet 1,000 mg  1,000 mg Oral Q6H PRN    0.9% sodium chloride infusion 250 mL  250 mL IntraVENous PRN        Objective:     Visit Vitals  /64 (BP 1 Location: Left upper arm, BP Patient Position: Lying right side)   Pulse 75   Temp 98.2 °F (36.8 °C)   Resp 20   Ht 5' 3\" (1.6 m)   Wt 56.4 kg (124 lb 5.4 oz)   SpO2 96%   Breastfeeding No   BMI 22.03 kg/m²   Blood pressure 139/64, pulse 75, temperature 98.2 °F (36.8 °C), resp. rate 20, height 5' 3\" (1.6 m), weight 56.4 kg (124 lb 5.4 oz), SpO2 96 %, not currently breastfeeding. No intake/output data recorded. 05/16 1901 - 05/18 0700  In: 3280 [I.V.:2930]  Out: 1100 [Urine:1100]      Intake/Output Summary (Last 24 hours) at 5/18/2022 0840  Last data filed at 5/17/2022 1600  Gross per 24 hour   Intake --   Output 400 ml   Net -400 ml         Physical Exam:       General: unkempt WF, NAD  Chest:  CTA, No rhonchi, rales or rubs.   Heart: S1, S2, RRR  GI: Soft, subjective epigastric tenderness without guarding or rebound, ND + bowel sounds  Extremities: No edema      Labs:       Recent Results (from the past 24 hour(s))   ECHO ADULT COMPLETE    Collection Time: 05/17/22  2:10 PM   Result Value Ref Range    IVSd 1.6 (A) 0.6 - 0.9 cm    LVIDd 4.5 3.9 - 5.3 cm    LVIDs 2.4 cm    LVOT Diameter 2.2 cm    LVPWd 1.1 (A) 0.6 - 0.9 cm    LVOT Peak Gradient 5 mmHg    LVOT Peak Gradient 5 mmHg    LVOT Mean Gradient 3 mmHg    LVOT SV 87.4 ml    LVOT Peak Velocity 1.1 m/s    LVOT Peak Velocity 1.1 m/s    LVOT VTI 23.0 cm    RV Free Wall Peak S' 28 cm/s    LA Diameter 3.3 cm    AV Area by Peak Velocity 2.2 cm2    AV Area by Peak Velocity 2.2 cm2    AV Area by Peak Velocity 2.3 cm2    AV Area by Peak Velocity 2.2 cm2    AV Area by VTI 2.5 cm2    AR .9 millisecond    AR Max Velocity PISA 4.5 m/s    AV Peak Gradient 12 mmHg    AV Peak Gradient 13 mmHg    AV Mean Gradient 6 mmHg    AV Peak Velocity 1.8 m/s    AV Peak Velocity 1.8 m/s    AV Mean Velocity 1.2 m/s    AV VTI 33.3 cm    MV A Velocity 1.14 m/s    MV E Velocity 0.67 m/s    LV E' Lateral Velocity 4 cm/s    LV E' Septal Velocity 6 cm/s    MR Peak Gradient 112 mmHg    MR Peak Velocity 5.3 m/s    PV Peak Gradient 5 mmHg    PV Max Velocity 1.1 m/s    TR Peak Gradient 35 mmHg    TR Max Velocity 2.94 m/s    Aortic Root 4.0 cm    Fractional Shortening 2D 47 28 - 44 %    LVIDd Index 2.85 cm/m2    LVIDs Index 1.52 cm/m2    LV RWT Ratio 0.49     LV Mass 2D 235.3 (A) 67 - 162 g    LV Mass 2D Index 148.9 (A) 43 - 95 g/m2    MV E/A 0.59     E/E' Ratio (Averaged) 13.96     E/E' Lateral 16.75     E/E' Septal 11.17     LVOT Stroke Volume Index 55.3 mL/m2    LVOT Area 3.8 cm2    LA Size Index 2.09 cm/m2    LA/AO Root Ratio 0.83     Ao Root Index 2.53 cm/m2    LVOT:AV VTI Index 0.69     SONAL/BSA VTI 1.6 cm2/m2    Est. RA Pressure 8 mmHg    RVSP 43 mmHg   CBC W/O DIFF    Collection Time: 05/17/22  3:22 PM   Result Value Ref Range    WBC 7.5 3.6 - 11.0 K/uL    RBC 3.28 (L) 3.80 - 5.20 M/uL    HGB 9.1 (L) 11.5 - 16.0 g/dL    HCT 29.6 (L) 35.0 - 47.0 %    MCV 90.2 80.0 - 99.0 FL    MCH 27.7 26.0 - 34.0 PG    MCHC 30.7 30.0 - 36.5 g/dL    RDW 15.5 (H) 11.5 - 14.5 %    PLATELET 268 (H) 424 - 400 K/uL    MPV 8.1 (L) 8.9 - 12.9 FL    NRBC 0.0 0  WBC    ABSOLUTE NRBC 0.00 0.00 - 0.01 K/uL   PROTHROMBIN TIME + INR    Collection Time: 05/17/22  3:22 PM   Result Value Ref Range    INR 1.0 0.9 - 1.1      Prothrombin time 10.6 9.0 - 11.1 sec   T4, FREE    Collection Time: 05/17/22  3:22 PM   Result Value Ref Range    T4, Free 2.2 (H) 0.8 - 1.5 NG/DL   METABOLIC PANEL, BASIC    Collection Time: 05/18/22  5:30 AM   Result Value Ref Range    Sodium 142 136 - 145 mmol/L    Potassium 3.4 (L) 3.5 - 5.1 mmol/L    Chloride 111 (H) 97 - 108 mmol/L    CO2 24 21 - 32 mmol/L    Anion gap 7 5 - 15 mmol/L    Glucose 81 65 - 100 mg/dL    BUN 4 (L) 6 - 20 MG/DL    Creatinine 0.52 (L) 0.55 - 1.02 MG/DL    BUN/Creatinine ratio 8 (L) 12 - 20      GFR est AA >60 >60 ml/min/1.73m2    GFR est non-AA >60 >60 ml/min/1.73m2    Calcium 8.5 8.5 - 10.1 MG/DL   CBC WITH AUTOMATED DIFF    Collection Time: 05/18/22  5:30 AM   Result Value Ref Range    WBC 6.0 3.6 - 11.0 K/uL    RBC 3.20 (L) 3.80 - 5.20 M/uL    HGB 9.0 (L) 11.5 - 16.0 g/dL    HCT 28.6 (L) 35.0 - 47.0 %    MCV 89.4 80.0 - 99.0 FL    MCH 28.1 26.0 - 34.0 PG    MCHC 31.5 30.0 - 36.5 g/dL    RDW 15.5 (H) 11.5 - 14.5 %    PLATELET 231 (H) 853 - 400 K/uL    MPV 8.1 (L) 8.9 - 12.9 FL    NRBC 0.0 0  WBC    ABSOLUTE NRBC 0.00 0.00 - 0.01 K/uL    NEUTROPHILS 65 32 - 75 %    LYMPHOCYTES 22 12 - 49 %    MONOCYTES 10 5 - 13 %    EOSINOPHILS 1 0 - 7 %    BASOPHILS 1 0 - 1 %    IMMATURE GRANULOCYTES 1 (H) 0.0 - 0.5 %    ABS. NEUTROPHILS 3.8 1.8 - 8.0 K/UL    ABS. LYMPHOCYTES 1.3 0.8 - 3.5 K/UL    ABS. MONOCYTES 0.6 0.0 - 1.0 K/UL    ABS. EOSINOPHILS 0.1 0.0 - 0.4 K/UL    ABS. BASOPHILS 0.1 0.0 - 0.1 K/UL    ABS. IMM.  GRANS. 0.1 (H) 0.00 - 0.04 K/UL    DF SMEAR SCANNED      RBC COMMENTS HYPOCHROMIA  1+       MAGNESIUM    Collection Time: 05/18/22  5:30 AM   Result Value Ref Range    Magnesium 2.1 1.6 - 2.4 mg/dL   PHOSPHORUS    Collection Time: 05/18/22  5:30 AM   Result Value Ref Range    Phosphorus 2.7 2.6 - 4.7 MG/DL   PROTHROMBIN TIME + INR    Collection Time: 05/18/22  5:30 AM   Result Value Ref Range    INR 1.0 0.9 - 1.1      Prothrombin time 10.8 9.0 - 11.1 sec   LABRCNT(wbc:2,hgb:2,hct:2,plt:2,)  Recent Labs     05/18/22  0530 05/17/22  0327 05/16/22  1657    137 133*   K 3.4* 3.4* 3.3*   * 110* 101   CO2 24 21 22   BUN 4* 16 19   CREA 0.52* 0.74 0.96   GLU 81 71 101*   CA 8.5 8.0* 9.1   MG 2.1 1.9  --    PHOS 2.7 2.9  --    LABRCNT(sgot:3,gpt:3,ap:3,tbiL:3,TP:3,ALB:3,GLOB:3,ggt:3,aml:3,amyp:3,lpse:3,hlpse:3)  Recent Labs     05/18/22  0530 05/17/22  1522   INR 1.0 1.0   PTP 10.8 10.6     Recent Labs     05/16/22  1916 05/16/22  1657   AP  --  77   TP  --  7.2   ALB  --  3.0*   GLOB  --  4.2*   LPSE 109  --    BRIEFLAB(B12,FOL,FOLAT,RBCF)No results found for: FOL, RBCFLABRCNT(CPK:3,CpKMB:3,ckndx:3,troiq:3)No components found for: GLPOCBRIEFLAB(CHOL,CHOLX,CHOLP,CHLST,CHOLV,HDL,HDLC,HDLP,LDL,DLDL,LDLC,DLDLP,TGL,TGLX,TRIGL,TRIGP,CHHD,CHHDX)No results for input(s): PH, PCO2, PO2 in the last 72 hours. LABRCNT(CPK:3,CpKMB:3,ckndx:3,troiq:3)SHANNON Grigsby  No results for input(s): CPK, CKNDX, TROIQ in the last 72 hours. No lab exists for component: CPSHANNON Alvarez      Problem List:     Active Problems:    Iron deficiency anemia (5/16/2022)      GI bleed (5/16/2022)      Hematemesis (5/17/2022)        Impression:  Hematemesis  Erosive esophagitis- Grade B  Hiatal hernia  Hx of gastic sleeve  Chronic NSAID use  Schizoaffective disorder          Plan:  Patient with finding of Grade B erosive esophagitis. Continue on BID PPI and Carafate. Discussed avoiding all NSAIDs moving forward. Discussed with her Primary GI yesterday evening who reports patient is often non complianet and fails to follow up. Reiterated to importance of f/u with her primary GI in [place] as well as her bariatric surgeon to consider [facility] repair. Hgb stable today without further bleeding. We will sign off the case, feel free to call with questions or if further consultation is required.        SHANNON Brooks    5/18/2022  8:40 AM   [address] [facility], [address] [place] [address]  [phone]  [address] [place] [address] [place]: [phone]

## 2022-05-18 NOTE — PROGRESS NOTES
Transition of Care Plan:    RUR:14%  Disposition:Home w/family  Follow up appointments:  DME needed:n/a-pt owns a walker  Transportation at [address] or means to access home:        IM Medicare Letter:2nd IM needed  Is patient a BCPI-A Bundle: If yes, was Bundle Letter given?:    Is patient a [place] and connected with the [place]? If yes, was [place] transfer form completed and [facility] notified? Caregiver Contact:, Erika Pugh [phone]  Discharge Caregiver contacted prior to discharge? Care Conference needed?:                         Reason for Admission:  GI Bleed                 RUR Score:      14%               Plan for utilizing home health:    TBD      PCP: First and Last name:  Other, MD Chelsea     Name of Practice:    Are you a current patient: Yes/No:    Approximate date of last visit:    Can you participate in a virtual visit with your PCP:                     Current Advanced Directive/Advance Care Plan: Full Code      Healthcare Decision Maker:   Click here to complete [address] including selection of the [address] Relationship (ie \"Primary\")                             Transition of Care Plan:     Per pt's , pt lives in [place] at [address] [place] [address]. Patient states her address is [address], [address] [place] [address]. Patient uses a walker at [facility]. Patient has home O2 that is used occasionally. Per , pt came to [place] with cousin, Shell Brown for a visit.  also stated pt has Medicaid. MD to order therapy consult. CM will continue to follow.     Fleet Dykes  Ext [phone]

## 2022-05-18 NOTE — PROGRESS NOTES
Hospitalist Progress Note    NAME: Chetan Parker   :  1960   MRN:  [id number]     Admit date: 2022    Today's date: 22    PCP: Alessandro Anderson MD     Anticipated discharge date: 2022    Barriers:  Safe discharge back to [address] [place] [address]. Assessment / Plan:    Hypovolemic shock POA initial BP 63/40, improved with IVF  Acute blood loss anemia POA Hgb  Peptic ulcer disease POA  - Reportedly vomited blood PTA, hypotensive in ED  - S/p 2 L IVF in ED  - S/p 1 unit PRBCs   - EGD    Esophagus:             Lower 1/3 of esophagus with scarred appearing mucosa with some friability seen extending from 30-33 cm from incisors. Slight oozing but no mucosal tear seen. This appeared c/w partially healed LA Grade B erosive esophagitis. No bx performed. There was no other nodularity seen or mucosal lesions in esophagus. There was a moderate sized hiatal hernia with GEJ located at 40 cm form incisors. Stomach:               Normal mucosa throughout the stomach that appeared cylindrical shaped in keeping with prior Gastric sleeve. No ulcers in stomach. Pylorus patent. Duodenum:             Normal mucosa to second portion.  - Avoid NSAIDs  - on IV PPI, switch to PO in AM.  Continue carafate with PPI  - on clears today. Advance this evening    Deconditioned  DJD, knees  - was on NSAIDs for DJD. Try hydrocodone prn. Baseline walks sometimes by herself but frequently needs cane. Does not drive.      - PT OT eval and treat.     Bradycardia  Hypothyroidism  -TSH 0.10  -on synthroid and cytomel PTA. Needs repeat TSH in 4 weeks with PCP     COPD  -duoneb Q 6 hr  -budesonide neb Q 12     Schizoaffective disorder POA  Recent admit for clonopine/suboxone overdose in [address] [place] [address] several months ago  Chronic pain syndrome  Tylenol  Reports taking neurontin, will add back  Continue psych meds        Body mass index is 22.03 kg/m².     Code status: Full  Prophylaxis: SCD's  Recommended Disposition: Home w/Family    Subjective:     Chief Complaint / Reason for Physician Visit  \"I am hurting all over\". Discussed with RN events overnight. Complaints of chronic back, joint pain  No further bleeding per NS  EGD today  Pt asking for clear liquids     Review of Systems:  Symptom Y/N Comments  Symptom Y/N Comments   Fever/Chills n   Chest Pain n    Poor Appetite    Edema     Cough n   Abdominal Pain n    Sputum    Joint Pain y    SOB/HILL n   Headache     Nausea/vomit n   Tolerating PT/OT     Diarrhea    Tolerating Diet y    Constipation    Other       Could NOT obtain due to:      Objective:     VITALS:   Last 24hrs VS reviewed since prior progress note. Most recent are:  Patient Vitals for the past 24 hrs:   Temp Pulse Resp BP SpO2   05/18/22 1600 -- 82 -- -- --   05/18/22 1522 98.3 °F (36.8 °C) 82 18 (!) 163/68 96 %   05/18/22 1200 -- 80 -- -- --   05/18/22 1100 -- 80 -- (!) 154/81 --   05/18/22 1055 -- 79 18 (!) 169/103 99 %   05/18/22 0815 -- 75 20 -- --   05/18/22 0800 98.2 °F (36.8 °C) 75 25 139/64 96 %   05/18/22 0745 -- 68 21 -- --   05/18/22 0730 -- 76 15 -- --   05/18/22 0715 -- 77 14 -- --   05/18/22 0700 -- 84 13 -- --   05/18/22 0539 98.4 °F (36.9 °C) 87 13 (!) 142/81 97 %   05/18/22 0229 -- -- -- -- 94 %   05/18/22 0048 98.5 °F (36.9 °C) 81 20 (!) 157/69 95 %   05/17/22 2000 98.7 °F (37.1 °C) 100 (!) 34 (!) 158/74 --   05/17/22 1948 -- -- -- -- 97 %   05/17/22 1900 -- 89 23 -- --   05/17/22 1800 -- 91 25 (!) 144/74 --     No intake or output data in the 24 hours ending 05/18/22 1712     Wt Readings from Last 12 Encounters:   05/17/22 56.4 kg (124 lb 5.4 oz)   05/16/22 55.7 kg (122 lb 12.8 oz)   05/10/22 59.9 kg (132 lb)       PHYSICAL EXAM:    I had a face to face encounter and independently examined this patient on 05/18/22 as outlined below:    General: WD, WN. Alert, cooperative, no acute distress    EENT:  PERRL. Anicteric sclerae.  MMM  Resp:  Rhonchi bilaterally, no wheezing or rales. No accessory muscle use  CV:  Regular  rhythm,  No edema  GI:  Soft, Non distended, Non tender. +Bowel sounds, no rebound  Neurologic:  Alert and oriented X 3, normal speech, non focal motor exam  Psych:   Good insight. Not anxious nor agitated  Skin:  No rashes. No jaundice    Reviewed most current lab test results and cultures  YES  Reviewed most current radiology test results   YES  Review and summation of old records today    NO  Reviewed patient's current orders and MAR    YES  PMH/SH reviewed - no change compared to H&P  ________________________________________________________________________  Care Plan discussed with:    Comments   Patient y    Family      RN y    Care Manager     Consultant                        Multidiciplinary team rounds were held today with , nursing, pharmacist and clinical coordinator. Patient's plan of care was discussed; medications were reviewed and discharge planning was addressed. ________________________________________________________________________      Comments   >50% of visit spent in counseling and coordination of care     ________________________________________________________________________  Clint Castro MD     Procedures: see electronic medical records for all procedures/Xrays and details which were not copied into this note but were reviewed prior to creation of Plan. LABS:  I reviewed today's most current labs and imaging studies.   Pertinent labs include:  Recent Labs     05/18/22  0530 05/17/22  1522 05/17/22  0819   WBC 6.0 7.5 7.7   HGB 9.0* 9.1* 7.8*   HCT 28.6* 29.6* 25.1*   * 640* 402*     Recent Labs     05/18/22  0530 05/17/22  1522 05/17/22  0327 05/16/22  1657     --  137 133*   K 3.4*  --  3.4* 3.3*   *  --  110* 101   CO2 24  --  21 22   GLU 81  --  71 101*   BUN 4*  --  16 19   CREA 0.52*  --  0.74 0.96   CA 8.5  --  8.0* 9.1   MG 2.1  --  1.9  --    PHOS 2.7  --  2.9  --    ALB  --   -- --  3.0*   TBILI  --   --   --  0.2   ALT  --   --   --  18   INR 1.0 1.0  --   --

## 2022-05-18 NOTE — PROGRESS NOTES
8764: TRANSFER - OUT REPORT:    Verbal report given to Nathaly Del Cid RN(name) on Latanya Heading  being transferred to Surg Tele(unit) for routine progression of care       Report consisted of patients Situation, Background, Assessment and   Recommendations(SBAR). Information from the following report(s) SBAR, Kardex, Intake/Output, MAR, Recent Results and Cardiac Rhythm NSR was reviewed with the receiving nurse. Lines:   Peripheral IV 05/16/22 Left;Posterior Forearm (Active)   Site Assessment Clean, dry, & intact 05/18/22 0000   Phlebitis Assessment 0 05/18/22 0000   Infiltration Assessment 0 05/18/22 0000   Dressing Status Clean, dry, & intact 05/18/22 0000   Dressing Type Transparent 05/18/22 0000   Hub Color/Line Status Pink;Patent; Flushed 05/18/22 0000   Action Taken Open ports on tubing capped 05/18/22 0000   Alcohol Cap Used Yes 05/17/22 2000       Peripheral IV 08/48/15 Right Basilic (Active)   Site Assessment Clean, dry, & intact 05/18/22 0000   Phlebitis Assessment 0 05/18/22 0000   Infiltration Assessment 0 05/18/22 0000   Dressing Status Clean, dry, & intact 05/18/22 0000   Dressing Type Transparent 05/18/22 0000   Hub Color/Line Status Patent;Pink 05/18/22 0000   Action Taken Open ports on tubing capped 05/18/22 0000   Alcohol Cap Used Yes 05/17/22 2000        Opportunity for questions and clarification was provided.       Patient transported with:   Monitor  Registered Nurse

## 2022-05-19 LAB
25(OH)D3 SERPL-MCNC: 41.1 NG/ML (ref 30–100)
ANION GAP SERPL CALC-SCNC: 6 MMOL/L (ref 5–15)
BASOPHILS # BLD: 0.1 K/UL (ref 0–0.1)
BASOPHILS NFR BLD: 1 % (ref 0–1)
BUN SERPL-MCNC: 3 MG/DL (ref 6–20)
BUN/CREAT SERPL: 5 (ref 12–20)
CALCIUM SERPL-MCNC: 8.4 MG/DL (ref 8.5–10.1)
CHLORIDE SERPL-SCNC: 110 MMOL/L (ref 97–108)
CO2 SERPL-SCNC: 24 MMOL/L (ref 21–32)
COMMENT, HOLDF: NORMAL
CREAT SERPL-MCNC: 0.55 MG/DL (ref 0.55–1.02)
DIFFERENTIAL METHOD BLD: ABNORMAL
EOSINOPHIL # BLD: 0.1 K/UL (ref 0–0.4)
EOSINOPHIL NFR BLD: 1 % (ref 0–7)
ERYTHROCYTE [DISTWIDTH] IN BLOOD BY AUTOMATED COUNT: 15.5 % (ref 11.5–14.5)
FERRITIN SERPL-MCNC: 17 NG/ML (ref 8–252)
GLUCOSE BLD STRIP.AUTO-MCNC: 100 MG/DL (ref 65–117)
GLUCOSE BLD STRIP.AUTO-MCNC: 94 MG/DL (ref 65–117)
GLUCOSE SERPL-MCNC: 88 MG/DL (ref 65–100)
HCT VFR BLD AUTO: 27.9 % (ref 35–47)
HGB BLD-MCNC: 8.6 G/DL (ref 11.5–16)
IMM GRANULOCYTES # BLD AUTO: 0 K/UL (ref 0–0.04)
IMM GRANULOCYTES NFR BLD AUTO: 1 % (ref 0–0.5)
IRON SATN MFR SERPL: 7 % (ref 20–50)
IRON SERPL-MCNC: 20 UG/DL (ref 35–150)
LYMPHOCYTES # BLD: 1.5 K/UL (ref 0.8–3.5)
LYMPHOCYTES NFR BLD: 22 % (ref 12–49)
MAGNESIUM SERPL-MCNC: 2 MG/DL (ref 1.6–2.4)
MCH RBC QN AUTO: 27.7 PG (ref 26–34)
MCHC RBC AUTO-ENTMCNC: 30.8 G/DL (ref 30–36.5)
MCV RBC AUTO: 90 FL (ref 80–99)
MONOCYTES # BLD: 0.7 K/UL (ref 0–1)
MONOCYTES NFR BLD: 11 % (ref 5–13)
NEUTS SEG # BLD: 4.4 K/UL (ref 1.8–8)
NEUTS SEG NFR BLD: 65 % (ref 32–75)
NRBC # BLD: 0 K/UL (ref 0–0.01)
NRBC BLD-RTO: 0 PER 100 WBC
PLATELET # BLD AUTO: 636 K/UL (ref 150–400)
PMV BLD AUTO: 8.1 FL (ref 8.9–12.9)
POTASSIUM SERPL-SCNC: 3.7 MMOL/L (ref 3.5–5.1)
RBC # BLD AUTO: 3.1 M/UL (ref 3.8–5.2)
SAMPLES BEING HELD,HOLD: NORMAL
SERVICE CMNT-IMP: NORMAL
SERVICE CMNT-IMP: NORMAL
SODIUM SERPL-SCNC: 140 MMOL/L (ref 136–145)
TIBC SERPL-MCNC: 292 UG/DL (ref 250–450)
WBC # BLD AUTO: 6.7 K/UL (ref 3.6–11)

## 2022-05-19 PROCEDURE — 82306 VITAMIN D 25 HYDROXY: CPT

## 2022-05-19 PROCEDURE — 97535 SELF CARE MNGMENT TRAINING: CPT

## 2022-05-19 PROCEDURE — 65270000046 HC RM TELEMETRY

## 2022-05-19 PROCEDURE — 74011250636 HC RX REV CODE- 250/636: Performed by: PHYSICIAN ASSISTANT

## 2022-05-19 PROCEDURE — 74011250636 HC RX REV CODE- 250/636: Performed by: NURSE PRACTITIONER

## 2022-05-19 PROCEDURE — 74011000250 HC RX REV CODE- 250: Performed by: NURSE PRACTITIONER

## 2022-05-19 PROCEDURE — 97116 GAIT TRAINING THERAPY: CPT | Performed by: PHYSICAL THERAPIST

## 2022-05-19 PROCEDURE — 97161 PT EVAL LOW COMPLEX 20 MIN: CPT | Performed by: PHYSICAL THERAPIST

## 2022-05-19 PROCEDURE — 74011250637 HC RX REV CODE- 250/637: Performed by: SPECIALIST

## 2022-05-19 PROCEDURE — 97165 OT EVAL LOW COMPLEX 30 MIN: CPT

## 2022-05-19 PROCEDURE — 74011250637 HC RX REV CODE- 250/637: Performed by: INTERNAL MEDICINE

## 2022-05-19 PROCEDURE — 83540 ASSAY OF IRON: CPT

## 2022-05-19 PROCEDURE — 74011250637 HC RX REV CODE- 250/637: Performed by: NURSE PRACTITIONER

## 2022-05-19 PROCEDURE — 74011250636 HC RX REV CODE- 250/636: Performed by: INTERNAL MEDICINE

## 2022-05-19 PROCEDURE — 82962 GLUCOSE BLOOD TEST: CPT

## 2022-05-19 PROCEDURE — 74011250637 HC RX REV CODE- 250/637: Performed by: HOSPITALIST

## 2022-05-19 PROCEDURE — 74011000250 HC RX REV CODE- 250: Performed by: INTERNAL MEDICINE

## 2022-05-19 PROCEDURE — 83735 ASSAY OF MAGNESIUM: CPT

## 2022-05-19 PROCEDURE — 82728 ASSAY OF FERRITIN: CPT

## 2022-05-19 PROCEDURE — 85025 COMPLETE CBC W/AUTO DIFF WBC: CPT

## 2022-05-19 PROCEDURE — 80048 BASIC METABOLIC PNL TOTAL CA: CPT

## 2022-05-19 PROCEDURE — C9113 INJ PANTOPRAZOLE SODIUM, VIA: HCPCS | Performed by: NURSE PRACTITIONER

## 2022-05-19 RX ORDER — LEVOTHYROXINE SODIUM 150 UG/1
150 TABLET ORAL
Status: DISCONTINUED | OUTPATIENT
Start: 2022-05-20 | End: 2022-05-28 | Stop reason: HOSPADM

## 2022-05-19 RX ORDER — BUTALBITAL, ACETAMINOPHEN AND CAFFEINE 50; 325; 40 MG/1; MG/1; MG/1
1 TABLET ORAL
Status: DISCONTINUED | OUTPATIENT
Start: 2022-05-19 | End: 2022-05-28 | Stop reason: HOSPADM

## 2022-05-19 RX ORDER — PANTOPRAZOLE SODIUM 40 MG/1
40 TABLET, DELAYED RELEASE ORAL
Status: DISCONTINUED | OUTPATIENT
Start: 2022-05-19 | End: 2022-05-28 | Stop reason: HOSPADM

## 2022-05-19 RX ADMIN — SODIUM CHLORIDE, PRESERVATIVE FREE 10 ML: 5 INJECTION INTRAVENOUS at 23:50

## 2022-05-19 RX ADMIN — ONDANSETRON HYDROCHLORIDE 4 MG: 2 INJECTION, SOLUTION INTRAMUSCULAR; INTRAVENOUS at 02:57

## 2022-05-19 RX ADMIN — GABAPENTIN 300 MG: 300 CAPSULE ORAL at 17:15

## 2022-05-19 RX ADMIN — FLUOXETINE HYDROCHLORIDE 60 MG: 20 CAPSULE ORAL at 08:02

## 2022-05-19 RX ADMIN — SODIUM CHLORIDE 40 MG: 9 INJECTION, SOLUTION INTRAMUSCULAR; INTRAVENOUS; SUBCUTANEOUS at 03:58

## 2022-05-19 RX ADMIN — HYDROCODONE BITARTRATE AND ACETAMINOPHEN 1 TABLET: 5; 325 TABLET ORAL at 11:52

## 2022-05-19 RX ADMIN — SUCRALFATE 1 G: 1 TABLET ORAL at 17:15

## 2022-05-19 RX ADMIN — ONDANSETRON HYDROCHLORIDE 4 MG: 2 INJECTION, SOLUTION INTRAMUSCULAR; INTRAVENOUS at 14:04

## 2022-05-19 RX ADMIN — SODIUM CHLORIDE, PRESERVATIVE FREE 10 ML: 5 INJECTION INTRAVENOUS at 14:09

## 2022-05-19 RX ADMIN — HYDROXYCHLOROQUINE SULFATE 200 MG: 200 TABLET ORAL at 08:02

## 2022-05-19 RX ADMIN — HYDROCODONE BITARTRATE AND ACETAMINOPHEN 1 TABLET: 5; 325 TABLET ORAL at 18:26

## 2022-05-19 RX ADMIN — HYDROCODONE BITARTRATE AND ACETAMINOPHEN 1 TABLET: 5; 325 TABLET ORAL at 03:57

## 2022-05-19 RX ADMIN — CLONAZEPAM 1 MG: 1 TABLET ORAL at 20:36

## 2022-05-19 RX ADMIN — BUTALBITAL, ACETAMINOPHEN, AND CAFFEINE 1 TABLET: 50; 325; 40 TABLET ORAL at 17:15

## 2022-05-19 RX ADMIN — PANTOPRAZOLE SODIUM 40 MG: 40 TABLET, DELAYED RELEASE ORAL at 17:15

## 2022-05-19 RX ADMIN — IRON SUCROSE 200 MG: 20 INJECTION, SOLUTION INTRAVENOUS at 17:15

## 2022-05-19 RX ADMIN — SUCRALFATE 1 G: 1 TABLET ORAL at 20:36

## 2022-05-19 RX ADMIN — GABAPENTIN 300 MG: 300 CAPSULE ORAL at 08:08

## 2022-05-19 RX ADMIN — LIOTHYRONINE SODIUM 5 MCG: 5 TABLET ORAL at 08:12

## 2022-05-19 RX ADMIN — LEVOTHYROXINE SODIUM 175 MCG: 0.15 TABLET ORAL at 03:57

## 2022-05-19 RX ADMIN — FUROSEMIDE 40 MG: 40 TABLET ORAL at 08:02

## 2022-05-19 RX ADMIN — SUCRALFATE 1 G: 1 TABLET ORAL at 11:52

## 2022-05-19 RX ADMIN — ONDANSETRON HYDROCHLORIDE 4 MG: 2 INJECTION, SOLUTION INTRAMUSCULAR; INTRAVENOUS at 08:02

## 2022-05-19 RX ADMIN — ONDANSETRON 4 MG: 2 INJECTION INTRAMUSCULAR; INTRAVENOUS at 20:36

## 2022-05-19 RX ADMIN — SODIUM CHLORIDE, PRESERVATIVE FREE 10 ML: 5 INJECTION INTRAVENOUS at 05:53

## 2022-05-19 RX ADMIN — SUCRALFATE 1 G: 1 TABLET ORAL at 03:58

## 2022-05-19 NOTE — PROGRESS NOTES
Problem: Self Care Deficits Care Plan (Adult)  Goal: *Acute Goals and Plan of Care (Insert Text)  Description: FUNCTIONAL STATUS PRIOR TO ADMISSION:  Patient poor historian giving conflicting reports of PLOF. Uses cane or walker for ambulation    HOME SUPPORT PRIOR TO ADMISSION: Patient stating she is homeless. Per chart she lives in [place] and was visiting family in [place]. Per CM, she has an address in [place]. Uncertain if she lives alone and/or has any support. Occupational Therapy Goals  Initiated 5/19/2022  1. Patient will perform grooming at the sink with supervision/set-up within 7 day(s). 2.  Patient will perform bathing at the sink with moderate assistance  within 7 day(s). 3.  Patient will perform lower body dressing with minimal assistance/contact guard assist within 7 day(s). 4.  Patient will perform toilet transfers with supervision/set-up within 7 day(s). 5.  Patient will perform all aspects of toileting with supervision/set-up within 7 day(s). 6.  Patient will participate in upper extremity therapeutic exercise/activities with supervision/set-up for 10 minutes within 7 day(s). 7.  Patient will utilize energy conservation techniques during functional activities with verbal cues within 7 day(s). Outcome: Not Met   OCCUPATIONAL THERAPY EVALUATION  Patient: Samir Poole (22 y.o. female)  Date: 5/19/2022  Primary Diagnosis: GI bleed [K92.2]  Procedure(s) (LRB):  ESOPHAGOGASTRODUODENOSCOPY (EGD) (N/A) 2 Days Post-Op   Precautions: fall       ASSESSMENT  Based on the objective data described below, the patient presents with decreased endurance, strength, functional mobility, ADLs and confusion. Pt is a poor historian, and stated that she was homeless and was not able to state where she was living prior. Pt was supine in bed and was able to come supine to sit with SBA and was able to stand with CGA. Pt stated she would not be able to walk to bathroom with RW, due to her right knee pain. Pt was able to hop on one foot  with RW to bathroom. Pt was CGA to SBA to transfer to toilet, and able to stand with CGA, and wash her hands at the sink. She needed VC and manual cues to use the RW correctly when transferring to toilet.  (pt turned and put the RW behind her and was pulling it as she tried to start to walk)  Pt was able to remain sitting up in chair and bed alarm activated, and pt was instructed on use of call bell. Pt does not appear to have the ability to learn new information. Pt will need rehab at discharge. Current Level of Function Impacting Discharge (ADLs/self-care): max to mod for ADLs     Functional Outcome Measure: The patient scored 55/100 on the Barthel outcome measure which is indicative of max to mod for ADls. Patient will benefit from skilled therapy intervention to address the above noted impairments. PLAN :  Recommendations and Planned Interventions: self care training, functional mobility training, therapeutic exercise, balance training, therapeutic activities, endurance activities, patient education, and home safety training    Frequency/Duration: Patient will be followed by occupational therapy 3 times a week to address goals. Recommendation for discharge: (in order for the patient to meet his/her long term goals)  Therapy up to 5 days/week in SNF setting    This discharge recommendation:  Has been made in collaboration with the attending provider and/or case management    IF patient discharges home will need the following DME: tbd       SUBJECTIVE:   Patient stated I cant walk. My knees are not good.     OBJECTIVE DATA SUMMARY:   HISTORY:   Past Medical History:   Diagnosis Date    Arthritis     Iron deficiency     Lupus ([facility] [address]     Osteoporosis     Sarcoidosis      Past Surgical History:   Procedure Laterality Date    HX GASTRIC BYPASS      gastric sleeve    HX HERNIA REPAIR  2013    needs a repat she says       Expanded or extensive additional review of patient history:     Home Situation  Home Environment: Other (comment) (homeless)  # Steps to Enter: 0  One/Two Story Residence: Other (Comment)  # of Interior Steps: 0  Living Alone: Yes (homeless)  Support Systems: Spouse/Significant Other,Other Family Member(s)  Patient Expects to be Discharged to[de-identified] Other:  Current DME Used/Available at Home: Walker, rolling    Hand dominance: Right    EXAMINATION OF PERFORMANCE DEFICITS:  Cognitive/Behavioral Status:  Neurologic State: Alert  Orientation Level: Oriented to person;Oriented to place  Cognition: Follows commands; Impaired decision making  Perception: Appears intact  Perseveration: No perseveration noted  Safety/Judgement: Fall prevention    Skin: in fair health     Edema: none    Hearing: Auditory  Auditory Impairment: None    Vision/Perceptual:                 Appears intact                    Range of Motion:    AROM: Within functional limits                         Strength:    Strength: Within functional limits                Coordination:  Coordination: Within functional limits  Fine Motor Skills-Upper: Left Intact; Right Intact    Gross Motor Skills-Upper: Left Intact; Right Intact    Tone & Sensation:    Tone: Normal              Balance:  Sitting: Intact  Standing: Impaired  Standing - Static: Good;Constant support  Standing - Dynamic : Good;Constant support (using RW for support)    Functional Mobility and Transfers for ADLs:  Bed Mobility:  Rolling: Stand-by assistance  Supine to Sit: Stand-by assistance  Scooting: Stand-by assistance    Transfers:  Sit to Stand: Contact guard assistance  Stand to Sit: Contact guard assistance  Bed to Chair: Contact guard assistance;Assist x1  Bathroom Mobility: Contact guard assistance  Toilet Transfer : Contact guard assistance;Assist x1    ADL Assessment:  Feeding: Setup    Oral Facial Hygiene/Grooming: Setup    Bathing: Maximum assistance;Minimum assistance    Upper Body Dressing: Setup    Lower Body Dressing: Maximum assistance;Minimum assistance    Toileting: Contact guard assistance;Stand by assistance             Cognitive Retraining  Safety/Judgement: Fall prevention         Functional Measure:    Barthel Index:  Bathin  Bladder: 10  Bowels: 10  Groomin  Dressin  Feeding: 10  Mobility: 0  Stairs: 0  Toilet Use: 5  Transfer (Bed to Chair and Back): 10  Total: 55/100      The Barthel ADL Index: Guidelines  1. The index should be used as a record of what a patient does, not as a record of what a patient could do. 2. The main aim is to establish degree of independence from any help, physical or verbal, however minor and for whatever reason. 3. The need for supervision renders the patient not independent. 4. A patient's performance should be established using the best available evidence. Asking the patient, friends/relatives and nurses are the usual sources, but direct observation and common sense are also important. However direct testing is not needed. 5. Usually the patient's performance over the preceding 24-48 hours is important, but occasionally longer periods will be relevant. 6. Middle categories imply that the patient supplies over 50 per cent of the effort. 7. Use of aids to be independent is allowed. Score Interpretation (from [address] [address])    Independent   60-79 Minimally independent   40-59 Partially dependent   20-39 Very dependent   <20 Totally dependent     -Chuck Harding., Barthel, D.W. (1965). Functional evaluation: the Barthel Index. [facility] ([address] (1997). The Barthel activities of daily living index: self-reporting versus actual performance in the old (> or = 75 years). Journal of [address] [place] [address] 45(7), [facility], .JMartyMCONTRERAS, Lisa Yost., Janette Mina. (1999).  Measuring the change in disability after inpatient rehabilitation; comparison of the responsiveness of the Barthel Index and Functional Ethel Measure. Journal of Neurology, Neurosurgery, and Psychiatry, 66(4), 593-623. BRISEYDA Knowles.A, JARON Fall, & Sammie Sage M.A. (2004) Assessment of post-stroke quality of life in cost-effectiveness studies: The usefulness of the Barthel Index and the EuroQoL-5D. Quality of Life Research, 15, 626-82         Occupational Therapy Evaluation Charge Determination   History Examination Decision-Making   MEDIUM Complexity : Expanded review of history including physical, cognitive and psychosocial  history  MEDIUM Complexity : 3-5 performance deficits relating to physical, cognitive , or psychosocial skils that result in activity limitations and / or participation restrictions MEDIUM Complexity : Patient may present with comorbidities that affect occupational performnce. Miniml to moderate modification of tasks or assistance (eg, physical or verbal ) with assesment(s) is necessary to enable patient to complete evaluation       Based on the above components, the patient evaluation is determined to be of the following complexity level: MEDIUM  Pain Rating:  Pt stated that she had pain in her knee but did not rate    Activity Tolerance:   Fair    After treatment patient left in no apparent distress:    Sitting in chair, Call bell within reach, and Bed / chair alarm activated    COMMUNICATION/EDUCATION:   The patients plan of care was discussed with: Physical therapist and Registered nurse. Patient is unable to participate in goal setting and plan of care. This patients plan of care is appropriate for delegation to [facility].     Thank you for this referral.  Bree Dow OT  Time Calculation: 19 mins

## 2022-05-19 NOTE — PROGRESS NOTES
End of Shift Note    Bedside shift change report given to (oncoming nurse) by Julisa Callahan RN (offgoing nurse). Report included the following information SBAR and Kardex    Shift worked:  [id number]   Shift summary and any significant changes:     Tele Sitter  Wants home meds on board for scheduled medications  Midline IV no blood return- unable to get labs drawn. ED nurse paged to do labs       Concerns for physician to address:  Possible new IV line     Zone phone for oncoming shift:   [phone]       Activity:  Activity Level: Up with Assistance  Number times ambulated in hallways past shift: 0  Number of times OOB to chair past shift: 0    Cardiac:   Cardiac Monitoring: Yes      Cardiac Rhythm: Sinus Rhythm    Access:   Current line(s): midline     Genitourinary:   Urinary status: voiding    Respiratory:   O2 Device: None (Room air)  Chronic home O2 use?: NO  Incentive spirometer at bedside: NO       GI:  Last Bowel Movement Date:  (PTA)  Current diet:  ADULT ORAL NUTRITION SUPPLEMENT Lunch; Protein Modular  ADULT DIET Easy to Chew  DIET ONE TIME MESSAGE  Passing flatus: YES  Tolerating current diet: YES       Pain Management:   Patient states pain is manageable on current regimen: NO    Skin:  Quan Score: 18  Interventions: float heels, increase time out of bed, foam dressing and PT/OT consult    Patient Safety:  Fall Score:  Total Score: 3  Interventions: bed/chair alarm, assistive device (walker, cane, etc), gripper socks, pt to call before getting OOB, stay with me (per policy) and tele sitter   High Fall Risk: Yes    Length of Stay:  Expected LOS: 4d 9h  Actual LOS: 3      Julisa Callahan RN

## 2022-05-19 NOTE — PROGRESS NOTES
Hospitalist Progress Note    NAME: Jae Worthington   :  1960   MRN:  [id number]     Admit date: 2022    Today's date: 22    PCP: Other, MD [place]     Anticipated discharge date:  Unknown    Barriers:  Homeless    Assessment / Plan:    Hypovolemic shock POA initial BP 63/40, improved with IVF  Acute blood loss anemia POA Hgb  GI bleeding from Erosive esophagitis, POA  Iron deficiency  - Reportedly vomited blood PTA, hypotensive in ED  - S/p 2 L IVF in ED  - S/p 1 unit PRBCs   - EGD    Esophagus:             Lower 1/3 of esophagus with scarred appearing mucosa with some friability seen extending from 30-33 cm from incisors. Slight oozing but no mucosal tear seen. This appeared c/w partially healed LA Grade B erosive esophagitis. No bx performed. There was no other nodularity seen or mucosal lesions in esophagus. There was a moderate sized hiatal hernia with GEJ located at 40 cm form incisors. Stomach:               Normal mucosa throughout the stomach that appeared cylindrical shaped in keeping with prior Gastric sleeve. No ulcers in stomach. Pylorus patent. Duodenum:             Normal mucosa to second portion.    - Avoid NSAIDs  - Continue carafate with PPI  - advance diet   - Iron sucrose 200mg IV x 1     Deconditioned  DJD, knees  - was on NSAIDs for DJD. Try hydrocodone prn. Baseline walks sometimes by herself but frequently needs cane. Does not drive.      - PT OT eval and treat.     Bradycardia  Hypothyroidism  -TSH 0.10  -Home synthroid reduced from 175 mcg to 150 mcg  -also on cytomel PTA.   Needs repeat TSH in 4 weeks with PCP     COPD  -duoneb Q 6 hr  -budesonide neb Q 12     Schizoaffective disorder POA  Recent admit for clonopine/suboxone overdose in [place] several months ago  Chronic pain syndrome  Tylenol  Reports taking neurontin, will add back  Continue psych meds        Body mass index is 22.03 kg/m². Code status: Full  Prophylaxis: SCD's  Recommended Disposition: Home w/Family    Subjective:     Chief Complaint / Reason for Physician Visit  Follow up GIB, ABLA, psych issues, chronic pain    Review of Systems:  Symptom Y/N Comments  Symptom Y/N Comments   Fever/Chills n   Chest Pain n    Poor Appetite    Edema     Cough n   Abdominal Pain n    Sputum    Joint Pain y    SOB/HILL n   Headache     Nausea/vomit n   Tolerating PT/OT     Diarrhea    Tolerating Diet y    Constipation    Other       Could NOT obtain due to:      Objective:     VITALS:   Last 24hrs VS reviewed since prior progress note. Most recent are:  Patient Vitals for the past 24 hrs:   Temp Pulse Resp BP SpO2   05/19/22 1504 98.5 °F (36.9 °C) 98 18 131/79 98 %   05/19/22 1109 98.5 °F (36.9 °C) (!) 108 18 138/77 98 %   05/19/22 0722 98.8 °F (37.1 °C) 72 18 (!) 144/70 98 %   05/19/22 0406 98.6 °F (37 °C) 70 17 (!) 149/66 97 %   05/19/22 0400 -- 73 -- -- --   05/18/22 2356 -- 75 -- -- --   05/18/22 2325 99 °F (37.2 °C) 73 17 138/66 97 %   05/18/22 2055 -- -- -- -- 98 %   05/18/22 2045 98.6 °F (37 °C) 91 18 132/75 98 %   05/18/22 2000 -- 91 -- -- --   05/18/22 1600 -- 82 -- -- --       Intake/Output Summary (Last 24 hours) at 5/19/2022 1545  Last data filed at 5/18/2022 1942  Gross per 24 hour   Intake 240 ml   Output --   Net 240 ml        Wt Readings from Last 12 Encounters:   05/17/22 56.4 kg (124 lb 5.4 oz)   05/16/22 55.7 kg (122 lb 12.8 oz)   05/10/22 59.9 kg (132 lb)       PHYSICAL EXAM:    I had a face to face encounter and independently examined this patient on 05/19/22 as outlined below:    General: WD, WN. Alert, cooperative, no acute distress    EENT:  PERRL. Anicteric sclerae. MMM  Resp:  Rhonchi bilaterally, no wheezing or rales. No accessory muscle use  CV:  Regular  rhythm,  No edema  GI:  Soft, Non distended, Non tender.   +Bowel sounds, no rebound  Neurologic:  Alert and oriented X 3, normal speech, non focal motor exam  Psych:   Good insight. Not anxious nor agitated  Skin:  No rashes. No jaundice    Reviewed most current lab test results and cultures  YES  Reviewed most current radiology test results   YES  Review and summation of old records today    NO  Reviewed patient's current orders and MAR    YES  PMH/SH reviewed - no change compared to H&P  ________________________________________________________________________  Care Plan discussed with:    Comments   Patient y    Family      RN y    Care Manager     Consultant                        Multidiciplinary team rounds were held today with , nursing, pharmacist and clinical coordinator. Patient's plan of care was discussed; medications were reviewed and discharge planning was addressed. ________________________________________________________________________      Comments   >50% of visit spent in counseling and coordination of care     ________________________________________________________________________  Juan J Rubi MD     Procedures: see electronic medical records for all procedures/Xrays and details which were not copied into this note but were reviewed prior to creation of Plan. LABS:  I reviewed today's most current labs and imaging studies.   Pertinent labs include:  Recent Labs     05/19/22  0324 05/18/22  0530 05/17/22  1522   WBC 6.7 6.0 7.5   HGB 8.6* 9.0* 9.1*   HCT 27.9* 28.6* 29.6*   * 635* 640*     Recent Labs     05/19/22  0324 05/18/22  0530 05/17/22  1522 05/17/22  0327 05/16/22 1657 05/16/22 1657    142  --  137   < > 133*   K 3.7 3.4*  --  3.4*   < > 3.3*   * 111*  --  110*   < > 101   CO2 24 24  --  21   < > 22   GLU 88 81  --  71   < > 101*   BUN 3* 4*  --  16   < > 19   CREA 0.55 0.52*  --  0.74   < > 0.96   CA 8.4* 8.5  --  8.0*   < > 9.1   MG 2.0 2.1  --  1.9  --   --    PHOS  --  2.7  --  2.9  --   --    ALB  --   --   --   --   --  3.0*   TBILI  --   --   --   --   --  0.2   ALT  --   --   --   -- --  18   INR  --  1.0 1.0  --   --   --     < > = values in this interval not displayed.

## 2022-05-19 NOTE — PROGRESS NOTES
Problem: Mobility Impaired (Adult and Pediatric)  Goal: *Acute Goals and Plan of Care (Insert Text)  Description: FUNCTIONAL STATUS PRIOR TO ADMISSION: Patient poor historian giving conflicting reports of PLOF. Uses cane or walker for ambulation    HOME SUPPORT PRIOR TO ADMISSION: Patient stating she is homeless. Per chart she lives in [place] and was visiting family in [place]. Per CM, she has an address in [place]. Uncertain if she lives alone and/or has any support. Physical Therapy Goals  Initiated 5/19/2022  1. Patient will move from supine to sit and sit to supine , scoot up and down, and roll side to side in bed with independence within 7 day(s). 2.  Patient will transfer from bed to chair and chair to bed with supervision/set-up using the least restrictive device within 7 day(s). 3.  Patient will perform sit to stand with supervision/set-up within 7 day(s). 4.  Patient will ambulate with supervision/set-up for 100 feet with the least restrictive device within 7 day(s). 5.  Patient will ascend/descend 4 stairs with 1 handrail(s) with supervision/set-up within 7 day(s). Outcome: Not Met   PHYSICAL THERAPY EVALUATION  Patient: Maura Verdin (44 y.o. female)  Date: 5/19/2022  Primary Diagnosis: GI bleed [K92.2]  Procedure(s) (LRB):  ESOPHAGOGASTRODUODENOSCOPY (EGD) (N/A) 2 Days Post-Op   Precautions: falls       ASSESSMENT  Based on the objective data described below, the patient presents with increased c/o pain especially in B knees which limits her willingness to participate in therapy. Patient able to complete bed mobility with SBA and requires CGA for transfers and ambulation. Patient using RW for support and gait pattern/abnormalilies fluctuating constantly during ambulation. Patient initially utilizing a NWB gait pattern and then fully weight bearing through RLE with fluctuating antalgia noted.  Patient demonstrates impulsivity with mobility and requires frequent VC for proper use of RW which increases risk of falls. Patient with h/o schizoaffective disorder, with paranoia and general anxiety disorder which appears to be patient's greatest limiting factor at this time. Patient states she is homeless, but chart indicates and CM confirmed patient has a home in [place]. Uncertain of PLOF and support systems. Recommend SNF rehab at this time    Current Level of Function Impacting Discharge (mobility/balance): SBA to CGA    Functional Outcome Measure: The patient scored 55/100 on the Barthel outcome measure which is indicative of 45% functional impairement. Other factors to consider for discharge: uncertain of PPLOF and support systems     Patient will benefit from skilled therapy intervention to address the above noted impairments. PLAN :  Recommendations and Planned Interventions: bed mobility training, transfer training, gait training, therapeutic exercises, patient and family training/education and therapeutic activities      Frequency/Duration: Patient will be followed by physical therapy:  3 times a week to address goals. Recommendation for discharge: (in order for the patient to meet his/her long term goals)  Therapy up to 5 days/week in SNF setting    This discharge recommendation:  Has been made in collaboration with the attending provider and/or case management    IF patient discharges home will need the following DME: patient owns DME required for discharge         SUBJECTIVE:   Patient stated My knee have needed to be replaced for the last two years. They hurt me so bad.     OBJECTIVE DATA SUMMARY:   HISTORY:    Past Medical History:   Diagnosis Date    Arthritis     Iron deficiency     Lupus ([address]     Osteoporosis     Sarcoidosis      Past Surgical History:   Procedure Laterality Date    HX GASTRIC BYPASS      gastric sleeve    HX HERNIA REPAIR  2013    needs a repat she says       Personal factors and/or comorbidities impacting plan of care: h/o schizoaffective disorder, with paranoia and general anxiety disorder. Home Situation  Home Environment: Other (comment) (homeless)  # Steps to Enter: 0  One/Two Story Residence: Other (Comment)  # of Interior Steps: 0  Living Alone: Yes (homeless)  Support Systems: Spouse/Significant Other,Other Family Member(s)  Patient Expects to be Discharged to[de-identified] Other:  Current DME Used/Available at Home: Walker, rolling    EXAMINATION/PRESENTATION/DECISION MAKING:   Critical Behavior:  Neurologic State: Alert  Orientation Level: Oriented X4  Cognition: Impulsive,Follows commands     Hearing: Auditory  Auditory Impairment: None    Range Of Motion:  AROM: Within functional limits                       Strength:    Strength: Within functional limits                    Tone & Sensation:   Tone: Normal                              Coordination:  Coordination: Within functional limits  Vision:      Functional Mobility:  Bed Mobility:  Rolling: Stand-by assistance  Supine to Sit: Stand-by assistance     Scooting: Stand-by assistance  Transfers:  Sit to Stand: Contact guard assistance  Stand to Sit: Contact guard assistance                       Balance:   Sitting: Intact  Standing: Impaired  Standing - Static: Good;Constant support  Standing - Dynamic : Good;Constant support (using RW for support)  Ambulation/Gait Training:  Distance (ft): 20 Feet (ft) (10 feet x 2 with seated rest in bathroom)  Assistive Device: Walker, rolling;Gait belt  Ambulation - Level of Assistance: Contact guard assistance        Gait Abnormalities: Antalgic;Decreased step clearance (fluctuating weight bearing through RLE)        Base of Support: Shift to left     Speed/Nahomy: Fluctuations  Step Length:  (fluctuating)            Functional Measure:  Barthel Index:    Bathin  Bladder: 10  Bowels: 10  Groomin  Dressin  Feeding: 10  Mobility: 0  Stairs: 0  Toilet Use: 5  Transfer (Bed to Chair and Back): 10  Total: 55/100       The Barthel ADL Index: Guidelines  1.  The index should be used as a record of what a patient does, not as a record of what a patient could do. 2. The main aim is to establish degree of independence from any help, physical or verbal, however minor and for whatever reason. 3. The need for supervision renders the patient not independent. 4. A patient's performance should be established using the best available evidence. Asking the patient, friends/relatives and nurses are the usual sources, but direct observation and common sense are also important. However direct testing is not needed. 5. Usually the patient's performance over the preceding 24-48 hours is important, but occasionally longer periods will be relevant. 6. Middle categories imply that the patient supplies over 50 per cent of the effort. 7. Use of aids to be independent is allowed. Score Interpretation (from [address] [address])    Independent   60-79 Minimally independent   40-59 Partially dependent   20-39 Very dependent   <20 Totally dependent     -Chuck Harding., BarthelEVELYN. (1965). Functional evaluation: the Barthel Index. [facility] ([address] (1997). The Barthel activities of daily living index: self-reporting versus actual performance in the old (> or = 75 years). Journal of [address] [place] [address] 457), [facility], ...F, Kim Daley., Nanda Helton. (1999). Measuring the change in disability after inpatient rehabilitation; comparison of the responsiveness of the Barthel Index and Functional [place] Measure. Journal of Neurology, Neurosurgery, and Psychiatry, 66(4), 972-048. Johann Dee, N.J.A, JARON Fall, & Carol Arnold M.A. (2004) Assessment of post-stroke quality of life in cost-effectiveness studies: The usefulness of the Barthel Index and the EuroQoL-5D.  Quality of Life Research, 13, 427-43              Pain Rating:  Patient c/o headache and B knee pain    Activity Tolerance:   Fair and requires rest breaks    After treatment patient left in no apparent distress:   Sitting in chair, Call bell within reach and Bed / chair alarm activated    COMMUNICATION/EDUCATION:   The patients plan of care was discussed with: Occupational therapist, Registered nurse and Case management. Patient understands intent and goals of therapy, but is neutral about his/her participation.     Thank you for this referral.  Mark Marcelino, PT   Time Calculation: 19 mins

## 2022-05-19 NOTE — PROGRESS NOTES
Comprehensive Nutrition Assessment    Type and Reason for Visit: Initial,Positive nutrition screen    Nutrition Recommendations/Plan:   1. Continue diet as tolerated     Malnutrition Assessment:  Malnutrition Status:  Insufficient data (05/19/22 1454)        Nutrition Assessment:  Pt admitted with GI bleed. PMH: gastric sleeve (2013), COPD, PUD, psych hx. MST triggered for wt loss and poor appetite. Chart reviewed, pt working with staff at time of attempted visit. She is disoriented x 4 so ability to provide accurate hx is likely limited. She is reportedly homeless and typically resides in [place]. Noted n/v today? GI was following but signed off. Labs reviewed, WNL. Good appetite per PO flowsheet's below. Will need to determine malnutrition status upon F/U pending interview and NFPE. For now it looks like she is eating enough to meet est kcal and protein needs. Patient Vitals for the past 168 hrs:   % Diet Eaten   05/18/22 1942 76 - 100%          Nutrition Related Findings:    Meds: lasix, zofran, protonix, carafate. BM 5/16 Wound Type: None    Current Nutrition Intake & Therapies:  Average Meal Intake: %  Average Supplement Intake: None ordered  ADULT ORAL NUTRITION SUPPLEMENT Lunch; Protein Modular  ADULT DIET Easy to Chew  DIET ONE TIME MESSAGE    Anthropometric Measures:  Height: 5' 3\" (160 cm)  Ideal Body Weight (IBW): 115 lbs (52 kg)     Current Body Wt:  56.4 kg (124 lb 5.4 oz), 108.1 % IBW. Bed scale  Current BMI (kg/m2): 22                          BMI Category: Normal weight (BMI 18.5-24. 9)    Estimated Daily Nutrient Needs:        Energy (kcal/day): MSJ 1450 (1103 x 1.3)  Weight Used for Protein Requirements: Current  Protein (g/day): 68g (1.2gPro/kg)  Method Used for Fluid Requirements: 1 ml/kcal  Fluid (ml/day): 1450mL    Nutrition Diagnosis:   No nutrition diagnosis at this time     Nutrition Interventions:   Food and/or Nutrient Delivery: Continue current diet  Nutrition Education/Counseling: No recommendations at this time  Coordination of Nutrition Care: Continue to monitor while inpatient       Goals:     Goals: PO intake 75% or greater,by next RD assessment (in 2-4 days)       Nutrition Monitoring and Evaluation:   Behavioral-Environmental Outcomes: None identified  Food/Nutrient Intake Outcomes: Food and nutrient intake  Physical Signs/Symptoms Outcomes: Biochemical data,Nutrition focused physical findings,Skin,Weight,GI status    Discharge Planning:    Continue current diet    Jose G Rubalcava RD, CNSC  Contact: ext [phone]

## 2022-05-20 LAB — MAGNESIUM SERPL-MCNC: 2.2 MG/DL (ref 1.6–2.4)

## 2022-05-20 PROCEDURE — 74011000250 HC RX REV CODE- 250: Performed by: INTERNAL MEDICINE

## 2022-05-20 PROCEDURE — 36415 COLL VENOUS BLD VENIPUNCTURE: CPT

## 2022-05-20 PROCEDURE — 74011250637 HC RX REV CODE- 250/637: Performed by: SPECIALIST

## 2022-05-20 PROCEDURE — 74011250637 HC RX REV CODE- 250/637: Performed by: INTERNAL MEDICINE

## 2022-05-20 PROCEDURE — 83735 ASSAY OF MAGNESIUM: CPT

## 2022-05-20 PROCEDURE — 74011250636 HC RX REV CODE- 250/636: Performed by: PHYSICIAN ASSISTANT

## 2022-05-20 PROCEDURE — 65270000046 HC RM TELEMETRY

## 2022-05-20 PROCEDURE — 74011000250 HC RX REV CODE- 250: Performed by: NURSE PRACTITIONER

## 2022-05-20 PROCEDURE — 74011250637 HC RX REV CODE- 250/637: Performed by: HOSPITALIST

## 2022-05-20 PROCEDURE — 74011250637 HC RX REV CODE- 250/637: Performed by: NURSE PRACTITIONER

## 2022-05-20 RX ORDER — CLONAZEPAM 0.5 MG/1
0.5 TABLET ORAL
Status: DISCONTINUED | OUTPATIENT
Start: 2022-05-20 | End: 2022-05-28 | Stop reason: HOSPADM

## 2022-05-20 RX ADMIN — GABAPENTIN 300 MG: 300 CAPSULE ORAL at 18:19

## 2022-05-20 RX ADMIN — HYDROCODONE BITARTRATE AND ACETAMINOPHEN 1 TABLET: 5; 325 TABLET ORAL at 03:54

## 2022-05-20 RX ADMIN — SUCRALFATE 1 G: 1 TABLET ORAL at 23:34

## 2022-05-20 RX ADMIN — BUTALBITAL, ACETAMINOPHEN, AND CAFFEINE 1 TABLET: 50; 325; 40 TABLET ORAL at 12:32

## 2022-05-20 RX ADMIN — FLUOXETINE HYDROCHLORIDE 60 MG: 20 CAPSULE ORAL at 09:26

## 2022-05-20 RX ADMIN — LIOTHYRONINE SODIUM 5 MCG: 5 TABLET ORAL at 09:27

## 2022-05-20 RX ADMIN — HYDROCODONE BITARTRATE AND ACETAMINOPHEN 1 TABLET: 5; 325 TABLET ORAL at 11:03

## 2022-05-20 RX ADMIN — GABAPENTIN 300 MG: 300 CAPSULE ORAL at 09:26

## 2022-05-20 RX ADMIN — ONDANSETRON HYDROCHLORIDE 4 MG: 2 INJECTION, SOLUTION INTRAMUSCULAR; INTRAVENOUS at 20:10

## 2022-05-20 RX ADMIN — PANTOPRAZOLE SODIUM 40 MG: 40 TABLET, DELAYED RELEASE ORAL at 05:00

## 2022-05-20 RX ADMIN — HYDROCODONE BITARTRATE AND ACETAMINOPHEN 1 TABLET: 5; 325 TABLET ORAL at 23:34

## 2022-05-20 RX ADMIN — SODIUM CHLORIDE, PRESERVATIVE FREE 10 ML: 5 INJECTION INTRAVENOUS at 05:01

## 2022-05-20 RX ADMIN — HYDROCODONE BITARTRATE AND ACETAMINOPHEN 1 TABLET: 5; 325 TABLET ORAL at 16:52

## 2022-05-20 RX ADMIN — SUCRALFATE 1 G: 1 TABLET ORAL at 11:03

## 2022-05-20 RX ADMIN — SODIUM CHLORIDE, PRESERVATIVE FREE 10 ML: 5 INJECTION INTRAVENOUS at 12:32

## 2022-05-20 RX ADMIN — LEVOTHYROXINE SODIUM 150 MCG: 0.15 TABLET ORAL at 05:00

## 2022-05-20 RX ADMIN — SODIUM CHLORIDE, PRESERVATIVE FREE 10 ML: 5 INJECTION INTRAVENOUS at 20:09

## 2022-05-20 RX ADMIN — CLONAZEPAM 0.5 MG: 0.5 TABLET ORAL at 16:43

## 2022-05-20 RX ADMIN — PANTOPRAZOLE SODIUM 40 MG: 40 TABLET, DELAYED RELEASE ORAL at 16:04

## 2022-05-20 RX ADMIN — ONDANSETRON HYDROCHLORIDE 4 MG: 2 INJECTION, SOLUTION INTRAMUSCULAR; INTRAVENOUS at 03:54

## 2022-05-20 RX ADMIN — HYDROXYCHLOROQUINE SULFATE 200 MG: 200 TABLET ORAL at 09:27

## 2022-05-20 RX ADMIN — ONDANSETRON HYDROCHLORIDE 4 MG: 2 INJECTION, SOLUTION INTRAMUSCULAR; INTRAVENOUS at 09:14

## 2022-05-20 RX ADMIN — BUTALBITAL, ACETAMINOPHEN, AND CAFFEINE 1 TABLET: 50; 325; 40 TABLET ORAL at 06:06

## 2022-05-20 RX ADMIN — FUROSEMIDE 40 MG: 40 TABLET ORAL at 09:27

## 2022-05-20 RX ADMIN — SUCRALFATE 1 G: 1 TABLET ORAL at 16:04

## 2022-05-20 RX ADMIN — BUTALBITAL, ACETAMINOPHEN, AND CAFFEINE 1 TABLET: 50; 325; 40 TABLET ORAL at 18:19

## 2022-05-20 RX ADMIN — SUCRALFATE 1 G: 1 TABLET ORAL at 05:00

## 2022-05-20 RX ADMIN — ONDANSETRON HYDROCHLORIDE 4 MG: 2 INJECTION, SOLUTION INTRAMUSCULAR; INTRAVENOUS at 14:04

## 2022-05-20 NOTE — PROGRESS NOTES
Hospitalist Progress Note    NAME: Chetan Parker   :  1960   MRN:  [id number]     Admit date: 2022    Today's date: 22    PCP: Yesenia, MD [place]     Anticipated discharge date:  Unknown    Barriers:  Homeless. Need to wean off sitter over weekend and DC SNF next week when bed available    Assessment / Plan:    Hypovolemic shock POA initial BP 63/40, improved with IVF  Acute blood loss anemia POA Hgb  GI bleeding from Erosive esophagitis, POA  Iron deficiency  - Reportedly vomited blood PTA, hypotensive in ED  - S/p 2 L IVF in ED  - S/p 1 unit PRBCs   - EGD    Esophagus:             Lower 1/3 of esophagus with scarred appearing mucosa with some friability seen extending from 30-33 cm from incisors. Slight oozing but no mucosal tear seen. This appeared c/w partially healed LA Grade B erosive esophagitis. No bx performed. There was no other nodularity seen or mucosal lesions in esophagus. There was a moderate sized hiatal hernia with GEJ located at 40 cm form incisors. Stomach:               Normal mucosa throughout the stomach that appeared cylindrical shaped in keeping with prior Gastric sleeve. No ulcers in stomach. Pylorus patent. Duodenum:             Normal mucosa to second portion.    - Avoid NSAIDs  - Continue carafate with PPI  - advance diet   - Iron sucrose 200mg IV x 1 ()    Deconditioned  DJD, knees  - was on NSAIDs for DJD. Try hydrocodone prn. Baseline walks sometimes by herself but frequently needs cane. Does not drive.      - PT OT eval and treat. DC planning to SNF     Bradycardia  Hypothyroidism  -TSH 0.10  -Home synthroid reduced from 175 mcg to 150 mcg  -also on cytomel PTA.   Needs repeat TSH in 4 weeks with PCP     COPD  -duoneb Q 6 hr  -budesonide neb Q 12     Schizoaffective disorder POA  Recent admit for clonopine/suboxone overdose in [employer] several months ago  Chronic pain syndrome  Tylenol  Reports taking neurontin, will add back  Continue psych meds        Body mass index is 22.03 kg/m². Code status: Full  Prophylaxis: SCD's  Recommended Disposition: Home w/Family    Subjective:     Chief Complaint / Reason for Physician Visit  Follow up GIB, ABLA, psych issues, chronic pain    Review of Systems:  Symptom Y/N Comments  Symptom Y/N Comments   Fever/Chills n   Chest Pain n    Poor Appetite    Edema     Cough n   Abdominal Pain n    Sputum    Joint Pain y    SOB/HILL n   Headache     Nausea/vomit n   Tolerating PT/OT     Diarrhea    Tolerating Diet y    Constipation    Other       Could NOT obtain due to:      Objective:     VITALS:   Last 24hrs VS reviewed since prior progress note. Most recent are:  Patient Vitals for the past 24 hrs:   Temp Pulse Resp BP SpO2   05/20/22 1119 98.7 °F (37.1 °C) 89 18 114/71 98 %   05/20/22 0801 98.5 °F (36.9 °C) 78 16 114/75 100 %   05/20/22 0509 98.1 °F (36.7 °C) 89 17 133/85 100 %   05/20/22 0000 98.3 °F (36.8 °C) 73 18 128/75 98 %   05/19/22 2043 98.2 °F (36.8 °C) 82 17 127/72 98 %   05/19/22 2000 -- 82 -- -- --   05/19/22 1600 -- 98 -- -- --   05/19/22 1504 98.5 °F (36.9 °C) 98 18 131/79 98 %     No intake or output data in the 24 hours ending 05/20/22 1431     Wt Readings from Last 12 Encounters:   05/17/22 56.4 kg (124 lb 5.4 oz)   05/16/22 55.7 kg (122 lb 12.8 oz)   05/10/22 59.9 kg (132 lb)       PHYSICAL EXAM:    I had a face to face encounter and independently examined this patient on 05/20/22 as outlined below:    General: WD, WN. Alert, cooperative, no acute distress    EENT:  PERRL. Anicteric sclerae. MMM  Resp:  Rhonchi bilaterally, no wheezing or rales. No accessory muscle use  CV:  Regular  rhythm,  No edema  GI:  Soft, Non distended, Non tender. +Bowel sounds, no rebound  Neurologic:  Alert and oriented X 3, normal speech, non focal motor exam  Psych:   Good insight. Not anxious nor agitated  Skin:  No rashes.   No jaundice    Reviewed most current lab test results and cultures  YES  Reviewed most current radiology test results   YES  Review and summation of old records today    NO  Reviewed patient's current orders and MAR    YES  PMH/SH reviewed - no change compared to H&P  ________________________________________________________________________  Care Plan discussed with:    Comments   Patient y    Family      RN y    Care Manager     Consultant                        Multidiciplinary team rounds were held today with , nursing, pharmacist and clinical coordinator. Patient's plan of care was discussed; medications were reviewed and discharge planning was addressed. ________________________________________________________________________      Comments   >50% of visit spent in counseling and coordination of care     ________________________________________________________________________  Molly Dhillon MD     Procedures: see electronic medical records for all procedures/Xrays and details which were not copied into this note but were reviewed prior to creation of Plan. LABS:  I reviewed today's most current labs and imaging studies.   Pertinent labs include:  Recent Labs     05/19/22  0324 05/18/22  0530 05/17/22  1522   WBC 6.7 6.0 7.5   HGB 8.6* 9.0* 9.1*   HCT 27.9* 28.6* 29.6*   * 635* 640*     Recent Labs     05/20/22  0342 05/19/22  0324 05/18/22  0530 05/17/22  1522   NA  --  140 142  --    K  --  3.7 3.4*  --    CL  --  110* 111*  --    CO2  --  24 24  --    GLU  --  88 81  --    BUN  --  3* 4*  --    CREA  --  0.55 0.52*  --    CA  --  8.4* 8.5  --    MG 2.2 2.0 2.1  --    PHOS  --   --  2.7  --    INR  --   --  1.0 1.0

## 2022-05-20 NOTE — PROGRESS NOTES
Transition of Care Plan:     RUR:14%  Disposition:Home w/family vs SNF  Follow up appointments:  DME needed:n/a-pt owns a walker  Transportation at [facility] or means to access home:        IM Medicare Letter:2nd IM needed  Is patient a BCPI-A Bundle: If yes, was Bundle Letter given?:    Is patient a  and connected with the [place]? If yes, was [employer] transfer form completed and [facility] notified? Caregiver Contact:, Anoop Westbrook [phone]  Discharge Caregiver contacted prior to discharge? Care Conference needed?:         3:55  [facility] declined, no available bed. CM will continue to follow. Patient & , both in agreement w/SNF recommendation. Per pt request, CM sent a referral to [facility], [address] [place] [address].   stated he will be transporting pt home at end of SNF stay. CM will continue to follow.     Dieudonne Tuskegee  Ext [phone]

## 2022-05-21 LAB
ANION GAP SERPL CALC-SCNC: 5 MMOL/L (ref 5–15)
BASOPHILS # BLD: 0.1 K/UL (ref 0–0.1)
BASOPHILS NFR BLD: 1 % (ref 0–1)
BUN SERPL-MCNC: 17 MG/DL (ref 6–20)
BUN/CREAT SERPL: 24 (ref 12–20)
CALCIUM SERPL-MCNC: 8.6 MG/DL (ref 8.5–10.1)
CHLORIDE SERPL-SCNC: 104 MMOL/L (ref 97–108)
CO2 SERPL-SCNC: 28 MMOL/L (ref 21–32)
CREAT SERPL-MCNC: 0.72 MG/DL (ref 0.55–1.02)
DIFFERENTIAL METHOD BLD: ABNORMAL
EOSINOPHIL # BLD: 0.1 K/UL (ref 0–0.4)
EOSINOPHIL NFR BLD: 1 % (ref 0–7)
ERYTHROCYTE [DISTWIDTH] IN BLOOD BY AUTOMATED COUNT: 15.4 % (ref 11.5–14.5)
GLUCOSE SERPL-MCNC: 107 MG/DL (ref 65–100)
HCT VFR BLD AUTO: 32.8 % (ref 35–47)
HGB BLD-MCNC: 10.1 G/DL (ref 11.5–16)
IMM GRANULOCYTES # BLD AUTO: 0.1 K/UL (ref 0–0.04)
IMM GRANULOCYTES NFR BLD AUTO: 1 % (ref 0–0.5)
LYMPHOCYTES # BLD: 2.5 K/UL (ref 0.8–3.5)
LYMPHOCYTES NFR BLD: 29 % (ref 12–49)
MAGNESIUM SERPL-MCNC: 2 MG/DL (ref 1.6–2.4)
MCH RBC QN AUTO: 27.4 PG (ref 26–34)
MCHC RBC AUTO-ENTMCNC: 30.8 G/DL (ref 30–36.5)
MCV RBC AUTO: 89.1 FL (ref 80–99)
MONOCYTES # BLD: 0.9 K/UL (ref 0–1)
MONOCYTES NFR BLD: 10 % (ref 5–13)
NEUTS SEG # BLD: 5.1 K/UL (ref 1.8–8)
NEUTS SEG NFR BLD: 58 % (ref 32–75)
NRBC # BLD: 0 K/UL (ref 0–0.01)
NRBC BLD-RTO: 0 PER 100 WBC
PLATELET # BLD AUTO: 785 K/UL (ref 150–400)
PMV BLD AUTO: 8.1 FL (ref 8.9–12.9)
POTASSIUM SERPL-SCNC: 3.6 MMOL/L (ref 3.5–5.1)
RBC # BLD AUTO: 3.68 M/UL (ref 3.8–5.2)
SODIUM SERPL-SCNC: 137 MMOL/L (ref 136–145)
WBC # BLD AUTO: 8.8 K/UL (ref 3.6–11)

## 2022-05-21 PROCEDURE — 74011250636 HC RX REV CODE- 250/636: Performed by: INTERNAL MEDICINE

## 2022-05-21 PROCEDURE — 74011000250 HC RX REV CODE- 250: Performed by: INTERNAL MEDICINE

## 2022-05-21 PROCEDURE — 74011250636 HC RX REV CODE- 250/636: Performed by: PHYSICIAN ASSISTANT

## 2022-05-21 PROCEDURE — 80048 BASIC METABOLIC PNL TOTAL CA: CPT

## 2022-05-21 PROCEDURE — 65270000046 HC RM TELEMETRY

## 2022-05-21 PROCEDURE — 83735 ASSAY OF MAGNESIUM: CPT

## 2022-05-21 PROCEDURE — 74011250637 HC RX REV CODE- 250/637: Performed by: HOSPITALIST

## 2022-05-21 PROCEDURE — 74011250637 HC RX REV CODE- 250/637: Performed by: INTERNAL MEDICINE

## 2022-05-21 PROCEDURE — 74011000250 HC RX REV CODE- 250: Performed by: NURSE PRACTITIONER

## 2022-05-21 PROCEDURE — 94640 AIRWAY INHALATION TREATMENT: CPT

## 2022-05-21 PROCEDURE — 36415 COLL VENOUS BLD VENIPUNCTURE: CPT

## 2022-05-21 PROCEDURE — 74011250637 HC RX REV CODE- 250/637: Performed by: SPECIALIST

## 2022-05-21 PROCEDURE — 85025 COMPLETE CBC W/AUTO DIFF WBC: CPT

## 2022-05-21 PROCEDURE — 74011250637 HC RX REV CODE- 250/637: Performed by: NURSE PRACTITIONER

## 2022-05-21 PROCEDURE — 94760 N-INVAS EAR/PLS OXIMETRY 1: CPT

## 2022-05-21 RX ORDER — MORPHINE SULFATE 2 MG/ML
2 INJECTION, SOLUTION INTRAMUSCULAR; INTRAVENOUS ONCE
Status: COMPLETED | OUTPATIENT
Start: 2022-05-21 | End: 2022-05-21

## 2022-05-21 RX ORDER — MORPHINE SULFATE 2 MG/ML
INJECTION, SOLUTION INTRAMUSCULAR; INTRAVENOUS
Status: DISPENSED
Start: 2022-05-21 | End: 2022-05-22

## 2022-05-21 RX ORDER — LANOLIN ALCOHOL/MO/W.PET/CERES
1 CREAM (GRAM) TOPICAL
Status: DISCONTINUED | OUTPATIENT
Start: 2022-05-22 | End: 2022-05-28 | Stop reason: HOSPADM

## 2022-05-21 RX ADMIN — FUROSEMIDE 40 MG: 40 TABLET ORAL at 08:03

## 2022-05-21 RX ADMIN — BUTALBITAL, ACETAMINOPHEN, AND CAFFEINE 1 TABLET: 50; 325; 40 TABLET ORAL at 20:54

## 2022-05-21 RX ADMIN — HYDROXYCHLOROQUINE SULFATE 200 MG: 200 TABLET ORAL at 08:02

## 2022-05-21 RX ADMIN — HYDROCODONE BITARTRATE AND ACETAMINOPHEN 1 TABLET: 5; 325 TABLET ORAL at 22:31

## 2022-05-21 RX ADMIN — ONDANSETRON HYDROCHLORIDE 4 MG: 2 INJECTION, SOLUTION INTRAMUSCULAR; INTRAVENOUS at 01:52

## 2022-05-21 RX ADMIN — GABAPENTIN 300 MG: 300 CAPSULE ORAL at 17:05

## 2022-05-21 RX ADMIN — SODIUM CHLORIDE, PRESERVATIVE FREE 10 ML: 5 INJECTION INTRAVENOUS at 06:19

## 2022-05-21 RX ADMIN — BUTALBITAL, ACETAMINOPHEN, AND CAFFEINE 1 TABLET: 50; 325; 40 TABLET ORAL at 01:29

## 2022-05-21 RX ADMIN — PANTOPRAZOLE SODIUM 40 MG: 40 TABLET, DELAYED RELEASE ORAL at 07:01

## 2022-05-21 RX ADMIN — LEVOTHYROXINE SODIUM 150 MCG: 0.15 TABLET ORAL at 06:19

## 2022-05-21 RX ADMIN — BUTALBITAL, ACETAMINOPHEN, AND CAFFEINE 1 TABLET: 50; 325; 40 TABLET ORAL at 08:03

## 2022-05-21 RX ADMIN — PANTOPRAZOLE SODIUM 40 MG: 40 TABLET, DELAYED RELEASE ORAL at 16:10

## 2022-05-21 RX ADMIN — CLONAZEPAM 0.5 MG: 0.5 TABLET ORAL at 01:01

## 2022-05-21 RX ADMIN — ONDANSETRON HYDROCHLORIDE 4 MG: 2 INJECTION, SOLUTION INTRAMUSCULAR; INTRAVENOUS at 20:17

## 2022-05-21 RX ADMIN — LIOTHYRONINE SODIUM 5 MCG: 5 TABLET ORAL at 08:02

## 2022-05-21 RX ADMIN — HYDROCODONE BITARTRATE AND ACETAMINOPHEN 1 TABLET: 5; 325 TABLET ORAL at 17:06

## 2022-05-21 RX ADMIN — HYDROCODONE BITARTRATE AND ACETAMINOPHEN 1 TABLET: 5; 325 TABLET ORAL at 11:59

## 2022-05-21 RX ADMIN — BUTALBITAL, ACETAMINOPHEN, AND CAFFEINE 1 TABLET: 50; 325; 40 TABLET ORAL at 14:18

## 2022-05-21 RX ADMIN — CLONAZEPAM 0.5 MG: 0.5 TABLET ORAL at 17:06

## 2022-05-21 RX ADMIN — SODIUM CHLORIDE, PRESERVATIVE FREE 10 ML: 5 INJECTION INTRAVENOUS at 12:03

## 2022-05-21 RX ADMIN — SUCRALFATE 1 G: 1 TABLET ORAL at 07:01

## 2022-05-21 RX ADMIN — ONDANSETRON HYDROCHLORIDE 4 MG: 2 INJECTION, SOLUTION INTRAMUSCULAR; INTRAVENOUS at 13:32

## 2022-05-21 RX ADMIN — ONDANSETRON HYDROCHLORIDE 4 MG: 2 INJECTION, SOLUTION INTRAMUSCULAR; INTRAVENOUS at 08:03

## 2022-05-21 RX ADMIN — SODIUM CHLORIDE, PRESERVATIVE FREE 10 ML: 5 INJECTION INTRAVENOUS at 20:17

## 2022-05-21 RX ADMIN — HYDROCODONE BITARTRATE AND ACETAMINOPHEN 1 TABLET: 5; 325 TABLET ORAL at 06:19

## 2022-05-21 RX ADMIN — CLONAZEPAM 0.5 MG: 0.5 TABLET ORAL at 09:02

## 2022-05-21 RX ADMIN — MORPHINE SULFATE 2 MG: 2 INJECTION, SOLUTION INTRAMUSCULAR; INTRAVENOUS at 18:27

## 2022-05-21 RX ADMIN — FLUOXETINE HYDROCHLORIDE 60 MG: 20 CAPSULE ORAL at 08:03

## 2022-05-21 RX ADMIN — SUCRALFATE 1 G: 1 TABLET ORAL at 22:31

## 2022-05-21 RX ADMIN — SUCRALFATE 1 G: 1 TABLET ORAL at 16:09

## 2022-05-21 RX ADMIN — GABAPENTIN 300 MG: 300 CAPSULE ORAL at 08:02

## 2022-05-21 RX ADMIN — BUDESONIDE 250 MCG: 0.25 INHALANT RESPIRATORY (INHALATION) at 07:41

## 2022-05-21 RX ADMIN — SUCRALFATE 1 G: 1 TABLET ORAL at 11:59

## 2022-05-21 NOTE — PROGRESS NOTES
ADULT PROTOCOL: JET AEROSOL  REASSESSMENT    Patient  Sultana Cruz     62 y.o.   female     5/21/2022  11:03 AM    Breath Sounds Pre Procedure: Right Breath Sounds: Diminished                               Left Breath Sounds: Diminished    Breath Sounds Post Procedure: Right Breath Sounds: Diminished                                 Left Breath Sounds: Diminished    Breathing pattern: Pre procedure Breathing Pattern: Regular          Post procedure Breathing Pattern: Regular    Heart Rate: Pre procedure Pulse: 70           Post procedure Pulse: 79    Resp Rate: Pre procedure Respirations: 16           Post procedure Respirations: 16      Cough: Pre procedure Cough: Non-productive               Post procedure Cough: Non-productive      Oxygen:Room Air     Changed:NO    SpO2: Pre procedure SpO2: 98 %                 Post procedure SpO2: 97 %      Nebulizer Therapy: Current medications Aerosolized Medications: Pulmicort      Changed: No        Problem List:   Patient Active Problem List   Diagnosis Code    Iron deficiency anemia D50.9    GI bleed K92.2    Hematemesis K92.0       Respiratory Therapist: RT Medhat

## 2022-05-21 NOTE — PROGRESS NOTES
End of Shift Note    Bedside shift change report given to Crystal Hidalgo (oncoming nurse) by Sara Wolf RN (offgoing nurse). Report included the following information SBAR, Kardex, Intake/Output, MAR and Recent Results    Shift worked:  7p-7a     Shift summary and any significant changes:     Patient received PRN medication for pain this shift-- see MAR. Patient stated that current PRN medication for pain does not provide as much pain control as prior medication; patient stated previous medication wasToradol. Concerns for physician to address:  Pain management     Zone phone for oncoming shift:   [phone]       Activity:  Activity Level: Up with Assistance  Number times ambulated in hallways past shift: 0  Number of times OOB to chair past shift: 0    Cardiac:   Cardiac Monitoring: No      Cardiac Rhythm: Sinus Rhythm    Access:   Current line(s): PIV     Genitourinary:   Urinary status: voiding    Respiratory:   O2 Device: None (Room air)       GI:  Last Bowel Movement Date: 05/20/22  Current diet:  ADULT ORAL NUTRITION SUPPLEMENT Lunch; Protein Modular  DIET ONE TIME MESSAGE  ADULT DIET Dysphagia - Minced & Moist  Tolerating current diet: YES       Pain Management:   Patient states pain is manageable on current regimen: NO    Skin:  Quan Score: 18  Interventions: float heels and increase time out of bed    Patient Safety:  Fall Score:  Total Score: 3  Interventions: assistive device (walker, cane, etc), gripper socks, pt to call before getting OOB and tele sitter   High Fall Risk: Yes    Length of Stay:  Expected LOS: 4d 21h  Actual LOS: 5      Sara Wolf RN

## 2022-05-21 NOTE — PROGRESS NOTES
End of Shift Note    Bedside shift change report given to Gustavo Rodriguez RN (oncoming nurse) by Anthony Mcfarlane LPN (offgoing nurse). Report included the following information SBAR, Kardex and MAR    Shift worked:  7a-7p     Shift summary and any significant changes:    Patient complained of anxiety and pain throughout shift. Medicated patient-see MAR. Concerns for physician to address: Pain control   Zone phone for oncoming shift:  [phone]     Activity:  Activity Level: Up with Assistance  Number times ambulated in hallways past shift: 0  Number of times OOB to chair past shift: 1    Cardiac:   Cardiac Monitoring: No      Cardiac Rhythm: Sinus Rhythm    Access:   Current line(s): PIV     Genitourinary:   Urinary status: voiding    Respiratory:   O2 Device: None (Room air)  Chronic home O2 use?: NO  Incentive spirometer at bedside: NO       GI:  Last Bowel Movement Date: 05/20/22  Current diet:  ADULT ORAL NUTRITION SUPPLEMENT Lunch; Protein Modular  DIET ONE TIME MESSAGE  ADULT DIET Dysphagia - Minced & Moist  Passing flatus: YES  Tolerating current diet: YES       Pain Management:   Patient states pain is manageable on current regimen: NO    Skin:  Quan Score: 20  Interventions: increase time out of bed, PT/OT consult and nutritional support     Patient Safety:  Fall Score:  Total Score: 4  Interventions: gripper socks, pt to call before getting OOB and stay with me (per policy)  High Fall Risk: Yes    Length of Stay:  Expected LOS: 4d 21h  Actual LOS: [address], LPN

## 2022-05-21 NOTE — PROGRESS NOTES
Hospitalist Progress Note    NAME: Latanya Heading   :  1960   MRN:  [id number]     Admit date: 2022    Today's date: 22    PCP: Yesenia, MD Chelsea     Anticipated discharge date: More than 48 hours    Barriers:  Homeless. Need to wean off sitter over weekend and DC SNF next week when bed available    Assessment / Plan:    Hypovolemic shock POA initial BP 63/40, improved with IVF  Acute blood loss anemia POA Hgb  GI bleeding from Erosive esophagitis, POA  Iron deficiency  - Reportedly vomited blood PTA, hypotensive in ED  - S/p 2 L IVF in ED  - S/p 1 unit PRBCs   - EGD    Esophagus:             Lower 1/3 of esophagus with scarred appearing mucosa with some friability seen extending from 30-33 cm from incisors. Slight oozing but no mucosal tear seen. This appeared c/w partially healed LA Grade B erosive esophagitis. No bx performed. There was no other nodularity seen or mucosal lesions in esophagus. There was a moderate sized hiatal hernia with GEJ located at 40 cm form incisors. Stomach:               Normal mucosa throughout the stomach that appeared cylindrical shaped in keeping with prior Gastric sleeve. No ulcers in stomach. Pylorus patent. Duodenum:             Normal mucosa to second portion.    - Avoid NSAIDs  - Continue carafate with PPI  - advance diet   - Iron sucrose 200mg IV x 1 ()  - Start p.o. iron supplement  - Check iron profile    Deconditioned  DJD, knees  - was on NSAIDs for DJD. Try hydrocodone prn. Baseline walks sometimes by herself but frequently needs cane. Does not drive.      - PT OT eval and treat. DC planning to SNF  - Patient is asking to increase her opioid frequency. She also asking for tramadol because she reported that she has been on it.   I could not find tramadol in the PTA meds.     Bradycardia  Hypothyroidism  -TSH 0.10  -Home synthroid reduced from 175 mcg to 150 mcg  -also on cytomel PTA. Needs repeat TSH in 4 weeks with PCP  - Bradycardia resolved     COPD  -duoneb Q 6 hr  -budesonide neb Q 12     Schizoaffective disorder POA  Recent admit for clonopine/suboxone overdose in [place] several months ago  Chronic pain syndrome  Tylenol  Reports taking neurontin, will add back  Continue psych meds        Body mass index is 22.03 kg/m². Code status: Full  Prophylaxis: SCD's  Recommended Disposition: Home w/Family    Subjective:     Patient was seen and examined. No acute events overnight. Discussed with RN overnight events. All patient's questions were answered. \"Still having pain in my legs but otherwise I am doing well\"    Review of Systems:  Symptom Y/N Comments  Symptom Y/N Comments   Fever/Chills n   Chest Pain n    Poor Appetite    Edema     Cough n   Abdominal Pain n    Sputum    Joint Pain y    SOB/HILL n   Headache     Nausea/vomit n   Tolerating PT/OT     Diarrhea    Tolerating Diet y    Constipation    Other       Could NOT obtain due to:      Objective:     VITALS:   Last 24hrs VS reviewed since prior progress note. Most recent are:  Patient Vitals for the past 24 hrs:   Temp Pulse Resp BP SpO2   05/21/22 0830 97.8 °F (36.6 °C) 74 16 119/69 97 %   05/21/22 0743 -- -- -- -- 98 %   05/21/22 0324 98.2 °F (36.8 °C) 77 17 109/64 97 %   05/20/22 2330 98 °F (36.7 °C) 85 18 112/66 95 %   05/20/22 2101 -- -- -- 93/65 --   05/20/22 2041 99.6 °F (37.6 °C) 92 18 (!) 89/65 96 %   05/20/22 1457 99 °F (37.2 °C) 85 18 95/69 97 %       Intake/Output Summary (Last 24 hours) at 5/21/2022 1146  Last data filed at 5/21/2022 4036  Gross per 24 hour   Intake 400 ml   Output --   Net 400 ml        Wt Readings from Last 12 Encounters:   05/17/22 56.4 kg (124 lb 5.4 oz)   05/16/22 55.7 kg (122 lb 12.8 oz)   05/10/22 59.9 kg (132 lb)       PHYSICAL EXAM:    I had a face to face encounter and independently examined this patient on 05/21/22 as outlined below:    General: WD, WN.  Alert, cooperative, no acute distress    EENT:  PERRL. Anicteric sclerae. MMM  Resp:  Rhonchi bilaterally, no wheezing or rales. No accessory muscle use  CV:  Regular  rhythm,  No edema  GI:  Soft, Non distended, Non tender. +Bowel sounds, no rebound  Neurologic:  EOMs intact. No facial asymmetry. No aphasia or slurred speech. Symmetrical strength, Sensation grossly intact. Alert and oriented X 4.     Psych:   Good insight. Not anxious nor agitated  Skin:  No rashes. No jaundice    Reviewed most current lab test results and cultures  YES  Reviewed most current radiology test results   YES  Review and summation of old records today    NO  Reviewed patient's current orders and MAR    YES  PMH/SH reviewed - no change compared to H&P  ________________________________________________________________________  Care Plan discussed with:    Comments   Patient y    Family      RN y    Care Manager     Consultant                        Multidiciplinary team rounds were held today with , nursing, pharmacist and clinical coordinator. Patient's plan of care was discussed; medications were reviewed and discharge planning was addressed. ________________________________________________________________________      Comments   >50% of visit spent in counseling and coordination of care     ________________________________________________________________________  Heather Weiss MD     Procedures: see electronic medical records for all procedures/Xrays and details which were not copied into this note but were reviewed prior to creation of Plan. LABS:  I reviewed today's most current labs and imaging studies.   Pertinent labs include:  Recent Labs     05/21/22  0314 05/19/22  0324   WBC 8.8 6.7   HGB 10.1* 8.6*   HCT 32.8* 27.9*   * 636*     Recent Labs     05/21/22 0314 05/20/22 0342 05/19/22 0324     --  140   K 3.6  --  3.7     --  110*   CO2 28  --  24   *  --  88   BUN 17  --  3*   CREA 0.72  --  0.55 CA 8.6  --  8.4*   MG 2.0 2.2 2.0

## 2022-05-22 ENCOUNTER — APPOINTMENT (OUTPATIENT)
Dept: CT IMAGING | Age: 62
DRG: 368 | End: 2022-05-22
Attending: INTERNAL MEDICINE
Payer: MEDICARE

## 2022-05-22 LAB
ALBUMIN SERPL-MCNC: 2.8 G/DL (ref 3.5–5)
ALBUMIN/GLOB SERPL: 0.8 {RATIO} (ref 1.1–2.2)
ALP SERPL-CCNC: 63 U/L (ref 45–117)
ALT SERPL-CCNC: 12 U/L (ref 12–78)
ANION GAP SERPL CALC-SCNC: 5 MMOL/L (ref 5–15)
AST SERPL-CCNC: 9 U/L (ref 15–37)
BASOPHILS # BLD: 0.1 K/UL (ref 0–0.1)
BASOPHILS NFR BLD: 1 % (ref 0–1)
BILIRUB SERPL-MCNC: 0.1 MG/DL (ref 0.2–1)
BUN SERPL-MCNC: 16 MG/DL (ref 6–20)
BUN/CREAT SERPL: 21 (ref 12–20)
CALCIUM SERPL-MCNC: 8.8 MG/DL (ref 8.5–10.1)
CHLORIDE SERPL-SCNC: 102 MMOL/L (ref 97–108)
CO2 SERPL-SCNC: 28 MMOL/L (ref 21–32)
CREAT SERPL-MCNC: 0.76 MG/DL (ref 0.55–1.02)
DIFFERENTIAL METHOD BLD: ABNORMAL
EOSINOPHIL # BLD: 0.2 K/UL (ref 0–0.4)
EOSINOPHIL NFR BLD: 2 % (ref 0–7)
ERYTHROCYTE [DISTWIDTH] IN BLOOD BY AUTOMATED COUNT: 15.1 % (ref 11.5–14.5)
GLOBULIN SER CALC-MCNC: 3.7 G/DL (ref 2–4)
GLUCOSE SERPL-MCNC: 105 MG/DL (ref 65–100)
HCT VFR BLD AUTO: 31.6 % (ref 35–47)
HGB BLD-MCNC: 9.9 G/DL (ref 11.5–16)
IMM GRANULOCYTES # BLD AUTO: 0.1 K/UL (ref 0–0.04)
IMM GRANULOCYTES NFR BLD AUTO: 1 % (ref 0–0.5)
IRON SATN MFR SERPL: 5 % (ref 20–50)
IRON SERPL-MCNC: 18 UG/DL (ref 35–150)
LYMPHOCYTES # BLD: 2.8 K/UL (ref 0.8–3.5)
LYMPHOCYTES NFR BLD: 36 % (ref 12–49)
MAGNESIUM SERPL-MCNC: 2 MG/DL (ref 1.6–2.4)
MCH RBC QN AUTO: 27.8 PG (ref 26–34)
MCHC RBC AUTO-ENTMCNC: 31.3 G/DL (ref 30–36.5)
MCV RBC AUTO: 88.8 FL (ref 80–99)
MONOCYTES # BLD: 0.7 K/UL (ref 0–1)
MONOCYTES NFR BLD: 9 % (ref 5–13)
NEUTS SEG # BLD: 3.8 K/UL (ref 1.8–8)
NEUTS SEG NFR BLD: 51 % (ref 32–75)
NRBC # BLD: 0 K/UL (ref 0–0.01)
NRBC BLD-RTO: 0 PER 100 WBC
PLATELET # BLD AUTO: 695 K/UL (ref 150–400)
PLATELET COMMENTS,PCOM: ABNORMAL
PMV BLD AUTO: 8 FL (ref 8.9–12.9)
POTASSIUM SERPL-SCNC: 3.7 MMOL/L (ref 3.5–5.1)
PROT SERPL-MCNC: 6.5 G/DL (ref 6.4–8.2)
RBC # BLD AUTO: 3.56 M/UL (ref 3.8–5.2)
RBC MORPH BLD: ABNORMAL
SODIUM SERPL-SCNC: 135 MMOL/L (ref 136–145)
TIBC SERPL-MCNC: 340 UG/DL (ref 250–450)
WBC # BLD AUTO: 7.7 K/UL (ref 3.6–11)

## 2022-05-22 PROCEDURE — 74011250636 HC RX REV CODE- 250/636: Performed by: PHYSICIAN ASSISTANT

## 2022-05-22 PROCEDURE — 74011250637 HC RX REV CODE- 250/637

## 2022-05-22 PROCEDURE — 74011250637 HC RX REV CODE- 250/637: Performed by: INTERNAL MEDICINE

## 2022-05-22 PROCEDURE — 74176 CT ABD & PELVIS W/O CONTRAST: CPT

## 2022-05-22 PROCEDURE — 83540 ASSAY OF IRON: CPT

## 2022-05-22 PROCEDURE — 65270000046 HC RM TELEMETRY

## 2022-05-22 PROCEDURE — 74011250637 HC RX REV CODE- 250/637: Performed by: SPECIALIST

## 2022-05-22 PROCEDURE — 85025 COMPLETE CBC W/AUTO DIFF WBC: CPT

## 2022-05-22 PROCEDURE — 74011250637 HC RX REV CODE- 250/637: Performed by: NURSE PRACTITIONER

## 2022-05-22 PROCEDURE — 83735 ASSAY OF MAGNESIUM: CPT

## 2022-05-22 PROCEDURE — 74011000636 HC RX REV CODE- 636: Performed by: INTERNAL MEDICINE

## 2022-05-22 PROCEDURE — 94760 N-INVAS EAR/PLS OXIMETRY 1: CPT

## 2022-05-22 PROCEDURE — 80053 COMPREHEN METABOLIC PANEL: CPT

## 2022-05-22 PROCEDURE — 74011000250 HC RX REV CODE- 250: Performed by: INTERNAL MEDICINE

## 2022-05-22 PROCEDURE — 36415 COLL VENOUS BLD VENIPUNCTURE: CPT

## 2022-05-22 PROCEDURE — 74011000250 HC RX REV CODE- 250: Performed by: NURSE PRACTITIONER

## 2022-05-22 PROCEDURE — 74011250637 HC RX REV CODE- 250/637: Performed by: HOSPITALIST

## 2022-05-22 RX ORDER — OXYCODONE HYDROCHLORIDE 5 MG/1
5 TABLET ORAL ONCE
Status: COMPLETED | OUTPATIENT
Start: 2022-05-22 | End: 2022-05-22

## 2022-05-22 RX ADMIN — FUROSEMIDE 40 MG: 40 TABLET ORAL at 08:35

## 2022-05-22 RX ADMIN — SODIUM CHLORIDE, PRESERVATIVE FREE 10 ML: 5 INJECTION INTRAVENOUS at 21:37

## 2022-05-22 RX ADMIN — CLONAZEPAM 0.5 MG: 0.5 TABLET ORAL at 19:51

## 2022-05-22 RX ADMIN — SODIUM CHLORIDE, PRESERVATIVE FREE 10 ML: 5 INJECTION INTRAVENOUS at 13:26

## 2022-05-22 RX ADMIN — ONDANSETRON HYDROCHLORIDE 4 MG: 2 INJECTION, SOLUTION INTRAMUSCULAR; INTRAVENOUS at 13:23

## 2022-05-22 RX ADMIN — BUTALBITAL, ACETAMINOPHEN, AND CAFFEINE 1 TABLET: 50; 325; 40 TABLET ORAL at 16:56

## 2022-05-22 RX ADMIN — SODIUM CHLORIDE, PRESERVATIVE FREE 10 ML: 5 INJECTION INTRAVENOUS at 05:27

## 2022-05-22 RX ADMIN — FLUOXETINE HYDROCHLORIDE 60 MG: 20 CAPSULE ORAL at 08:35

## 2022-05-22 RX ADMIN — SUCRALFATE 1 G: 1 TABLET ORAL at 11:02

## 2022-05-22 RX ADMIN — HYDROXYCHLOROQUINE SULFATE 200 MG: 200 TABLET ORAL at 08:35

## 2022-05-22 RX ADMIN — HYDROCODONE BITARTRATE AND ACETAMINOPHEN 1 TABLET: 5; 325 TABLET ORAL at 05:25

## 2022-05-22 RX ADMIN — HYDROCODONE BITARTRATE AND ACETAMINOPHEN 1 TABLET: 5; 325 TABLET ORAL at 16:56

## 2022-05-22 RX ADMIN — CLONAZEPAM 0.5 MG: 0.5 TABLET ORAL at 11:02

## 2022-05-22 RX ADMIN — GABAPENTIN 300 MG: 300 CAPSULE ORAL at 08:35

## 2022-05-22 RX ADMIN — PANTOPRAZOLE SODIUM 40 MG: 40 TABLET, DELAYED RELEASE ORAL at 16:56

## 2022-05-22 RX ADMIN — SUCRALFATE 1 G: 1 TABLET ORAL at 21:35

## 2022-05-22 RX ADMIN — HYDROCODONE BITARTRATE AND ACETAMINOPHEN 1 TABLET: 5; 325 TABLET ORAL at 10:59

## 2022-05-22 RX ADMIN — SUCRALFATE 1 G: 1 TABLET ORAL at 16:56

## 2022-05-22 RX ADMIN — LEVOTHYROXINE SODIUM 150 MCG: 0.15 TABLET ORAL at 05:25

## 2022-05-22 RX ADMIN — ONDANSETRON HYDROCHLORIDE 4 MG: 2 INJECTION, SOLUTION INTRAMUSCULAR; INTRAVENOUS at 02:15

## 2022-05-22 RX ADMIN — GABAPENTIN 300 MG: 300 CAPSULE ORAL at 19:51

## 2022-05-22 RX ADMIN — BUTALBITAL, ACETAMINOPHEN, AND CAFFEINE 1 TABLET: 50; 325; 40 TABLET ORAL at 11:02

## 2022-05-22 RX ADMIN — BUTALBITAL, ACETAMINOPHEN, AND CAFFEINE 1 TABLET: 50; 325; 40 TABLET ORAL at 03:30

## 2022-05-22 RX ADMIN — ONDANSETRON HYDROCHLORIDE 4 MG: 2 INJECTION, SOLUTION INTRAMUSCULAR; INTRAVENOUS at 08:09

## 2022-05-22 RX ADMIN — LIOTHYRONINE SODIUM 5 MCG: 5 TABLET ORAL at 08:35

## 2022-05-22 RX ADMIN — CLONAZEPAM 0.5 MG: 0.5 TABLET ORAL at 02:15

## 2022-05-22 RX ADMIN — IOPAMIDOL 100 ML: 755 INJECTION, SOLUTION INTRAVENOUS at 16:23

## 2022-05-22 RX ADMIN — ONDANSETRON HYDROCHLORIDE 4 MG: 2 INJECTION, SOLUTION INTRAMUSCULAR; INTRAVENOUS at 20:26

## 2022-05-22 RX ADMIN — OXYCODONE 5 MG: 5 TABLET ORAL at 02:15

## 2022-05-22 RX ADMIN — SUCRALFATE 1 G: 1 TABLET ORAL at 08:09

## 2022-05-22 RX ADMIN — PANTOPRAZOLE SODIUM 40 MG: 40 TABLET, DELAYED RELEASE ORAL at 08:09

## 2022-05-22 NOTE — PROGRESS NOTES
End of Shift Note    Bedside shift change report given to Crystal Romano (oncoming nurse) by Carlos Olson RN (offgoing nurse). Report included the following information SBAR, Kardex, MAR and Recent Results    Shift worked:  7p-7a     Shift summary and any significant changes:     Patient noted pain this shift; medication administered-- see MAR. Patient received morphine during day shift; during night shift, patient noted morphine provided more relief than currently prescribed PRN  Norco. Patient inquiring of possibility of additional doses of morphine as it provided more pain relief. Contacted NP Edita Matute this shift inquiring of breakthrough pain medication; Roxicodone ordered. Patient utilized bedside commode several times this shift. Patient utilized call light this shift. Patient appears to have appropriate safety awareness     Concerns for physician to address: Pain management    Discontinue tele sitter? Zone phone for oncoming shift:   [phone]       Activity:  Activity Level: Up with Assistance  Number times ambulated in hallways past shift: 0  Number of times OOB to chair past shift: 0    Cardiac:   Cardiac Monitoring: No      Cardiac Rhythm: Sinus Rhythm    Access:   Current line(s): PIV     Genitourinary:   Urinary status: voiding    Respiratory:   O2 Device: None (Room air)         GI:  Last Bowel Movement Date: 05/20/22  Current diet:  ADULT ORAL NUTRITION SUPPLEMENT Lunch; Protein Modular  DIET ONE TIME MESSAGE  ADULT DIET Dysphagia - Minced & Moist    Tolerating current diet: YES       Pain Management:   Patient states pain is manageable on current regimen: NO    Skin:  Quan Score: 20  Interventions: increase time out of bed    Patient Safety:  Fall Score:  Total Score: 4  Interventions: assistive device (walker, cane, etc), gripper socks, pt to call before getting OOB and tele sitter   High Fall Risk: Yes    Length of Stay:  Expected LOS: 4d 21h  Actual LOS: Remigio RN

## 2022-05-22 NOTE — PROGRESS NOTES
Hospitalist Progress Note    NAME: China Mota   :  1960   MRN:  [id number]     Admit date: 2022    Today's date: 22    PCP: Yesenia, MD [place]     Anticipated discharge date: More than 48 hours    Barriers:  Homeless. Need to wean off sitter over weekend and DC SNF next week when bed available    Assessment / Plan:    Hypovolemic shock POA initial BP 63/40, improved with IVF  Acute blood loss anemia POA Hgb  GI bleeding from Erosive esophagitis, POA  Iron deficiency  - Reportedly vomited blood PTA, hypotensive in ED  - S/p 2 L IVF in ED  - S/p 1 unit PRBCs   - EGD    Esophagus:             Lower 1/3 of esophagus with scarred appearing mucosa with some friability seen extending from 30-33 cm from incisors. Slight oozing but no mucosal tear seen. This appeared c/w partially healed LA Grade B erosive esophagitis. No bx performed. There was no other nodularity seen or mucosal lesions in esophagus. There was a moderate sized hiatal hernia with GEJ located at 40 cm form incisors. Stomach:               Normal mucosa throughout the stomach that appeared cylindrical shaped in keeping with prior Gastric sleeve. No ulcers in stomach. Pylorus patent. Duodenum:             Normal mucosa to second portion.    - Avoid NSAIDs  - Continue carafate with PPI  - advance diet   - Iron sucrose 200mg IV x 1 ()  - Started p.o. iron supplement given iron studies compatible with iron deficiency anemia  - Check iron profile    Deconditioned  DJD, knees  - was on NSAIDs for DJD. Try hydrocodone prn. Baseline walks sometimes by herself but frequently needs cane. Does not drive.      - PT OT eval and treat. DC planning to SNF  - Patient is asking to increase her opioid frequency again today. She also requested a higher dose of pain medication.   I discussed with the patient that I am concerned about opioid seeking behavior and educated her about risks of opioids. Abdominal pain  - Patient reported that she needs premedication for that reason  - LFTs obtained within normal limits  - Patient requesting CT abdomen, will order  - Physical exam is benign  - Continue previous Protonix 40 mg twice daily    Bradycardia  Hypothyroidism  -TSH 0.10  -Home synthroid reduced from 175 mcg to 150 mcg  -also on cytomel PTA. Needs repeat TSH in 4 weeks with PCP  - Bradycardia resolved     COPD  -duoneb Q 6 hr  -budesonide neb Q 12     Schizoaffective disorder POA  Recent admit for clonopine/suboxone overdose in [place] several months ago  Chronic pain syndrome  Tylenol  Reports taking neurontin, will add back  Continue psych meds        Body mass index is 22.03 kg/m². Code status: Full  Prophylaxis: SCD's  Recommended Disposition: Home w/Family    Subjective:     Patient was seen and examined. No acute events overnight. Discussed with RN overnight events. All patient's questions were answered. \"I really need my mid pain medication to be at higher dose or more frequent\"    Review of Systems:  Symptom Y/N Comments  Symptom Y/N Comments   Fever/Chills n   Chest Pain n    Poor Appetite    Edema     Cough n   Abdominal Pain y    Sputum    Joint Pain y    SOB/HILL n   Headache     Nausea/vomit n   Tolerating PT/OT     Diarrhea    Tolerating Diet y    Constipation    Other       Could NOT obtain due to:      Objective:     VITALS:   Last 24hrs VS reviewed since prior progress note.  Most recent are:  Patient Vitals for the past 24 hrs:   Temp Pulse Resp BP SpO2   05/22/22 0817 97.8 °F (36.6 °C) 76 18 127/64 94 %   05/22/22 0333 97.9 °F (36.6 °C) 79 18 138/63 96 %   05/22/22 0155 -- 80 -- (!) 113/56 --   05/21/22 2038 99 °F (37.2 °C) 80 18 97/71 100 %   05/21/22 2016 -- -- -- 105/63 --   05/21/22 1615 98.1 °F (36.7 °C) 81 18 134/72 98 %   05/21/22 1206 -- -- -- 110/74 --   05/21/22 1149 98.1 °F (36.7 °C) 76 16 116/76 98 %       Intake/Output Summary (Last 24 hours) at 5/22/2022 1127  Last data filed at 5/22/2022 0347  Gross per 24 hour   Intake --   Output 950 ml   Net -950 ml        Wt Readings from Last 12 Encounters:   05/17/22 56.4 kg (124 lb 5.4 oz)   05/16/22 55.7 kg (122 lb 12.8 oz)   05/10/22 59.9 kg (132 lb)       PHYSICAL EXAM:    I had a face to face encounter and independently examined this patient on 05/22/22 as outlined below:    General: WD, WN. Alert, cooperative, no acute distress    EENT:  PERRL. Anicteric sclerae. MMM  Resp:  Rhonchi bilaterally, no wheezing or rales. No accessory muscle use  CV:  Regular  rhythm,  No edema  GI:  Soft, Non distended, Non tender. +Bowel sounds, no rebound  Neurologic:  EOMs intact. No facial asymmetry. No aphasia or slurred speech. Symmetrical strength, Sensation grossly intact. Alert and oriented X 4.     Psych:   Good insight. Not anxious nor agitated  Skin:  No rashes. No jaundice    Reviewed most current lab test results and cultures  YES  Reviewed most current radiology test results   YES  Review and summation of old records today    NO  Reviewed patient's current orders and MAR    YES  PMH/SH reviewed - no change compared to H&P  ________________________________________________________________________  Care Plan discussed with:    Comments   Patient y    Family      RN y    Care Manager     Consultant                        Multidiciplinary team rounds were held today with , nursing, pharmacist and clinical coordinator. Patient's plan of care was discussed; medications were reviewed and discharge planning was addressed.      ________________________________________________________________________      Comments   >50% of visit spent in counseling and coordination of care     ________________________________________________________________________  Gregg Patiño MD     Procedures: see electronic medical records for all procedures/Xrays and details which were not copied into this note but were reviewed prior to creation of Plan. LABS:  I reviewed today's most current labs and imaging studies.   Pertinent labs include:  Recent Labs     05/22/22 0402 05/21/22 0314   WBC 7.7 8.8   HGB 9.9* 10.1*   HCT 31.6* 32.8*   * 785*     Recent Labs     05/22/22 0402 05/22/22  0359 05/21/22 0314 05/20/22  0342   *  --  137  --    K 3.7  --  3.6  --      --  104  --    CO2 28  --  28  --    *  --  107*  --    BUN 16  --  17  --    CREA 0.76  --  0.72  --    CA 8.8  --  8.6  --    MG  --  2.0 2.0 2.2   ALB 2.8*  --   --   --    TBILI 0.1*  --   --   --    ALT 12  --   --   --

## 2022-05-22 NOTE — PROGRESS NOTES
Nocturnist NP Note         0159- Notified by nursing that patient is noting of pain of 7 out of 10 in bilateral legs. She is requesting morphine, as she received a dose earlier and it gave her some relief. Blood pressure is 113/56. Has Norco ordered every 6 hours, but this is not available to be given again until 0430. Ordered a one-time dose of Roxicodone. Please note that this note was dictated using Dragon computer voice recognition software. Quite often unanticipated grammatical, syntax, homophones, and other interpretive errors are inadvertently transcribed by the computer software. Please disregard these errors. Please excuse any errors that have escaped final proofreading.

## 2022-05-22 NOTE — PROGRESS NOTES
Spiritual Care Assessment/Progress Note  [facility]      NAME: China Mota      MRN: [id number]  AGE: 62 y.o.  SEX: female  Voodoo Affiliation: Unknown   Language: English     5/22/2022     Total Time (in minutes): 26     Spiritual Assessment begun in [facility] 3 SURG TELE through conversation with:         [x]Patient        [] Family    [] Friend(s)        Reason for Consult: Initial/Spiritual assessment, patient floor     Spiritual beliefs: (Please include comment if needed)     [x] Identifies with a ursula tradition:   Pentecostal      [] Supported by a ursula community:            [] Claims no spiritual orientation:           [] Seeking spiritual identity:                [] Adheres to an individual form of spirituality:           [] Not able to assess:                           Identified resources for coping:      [x] Prayer                               [] Music                  [] Guided Imagery     [x] Family/friends                 [] Pet visits     [] Devotional reading                         [] Unknown     [x] Other: scripture                                            Interventions offered during this visit: (See comments for more details)    Patient Interventions: Affirmation of emotions/emotional suffering,Affirmation of ursula,Catharsis/review of pertinent events in supportive environment,Iconic (affirming the presence of God/Higher Power),Initial/Spiritual assessment, patient floor,Normalization of emotional/spiritual concerns,Prayer (assurance of),Voodoo beliefs/image of God discussed,Other (comment) (read scripture)           Plan of Care:     [] Support spiritual and/or cultural needs    [] Support AMD and/or advance care planning process      [] Support grieving process   [] Coordinate Rites and/or Rituals    [] Coordination with community clergy   [x] No spiritual needs identified at this time   [] Detailed Plan of Care below (See Comments)  [] Make referral to Music Therapy  [] Make referral to Pet Therapy     [] Make referral to Addiction services  [] Make referral to [facility]  [] Make referral to Spiritual Care Partner  [] No future visits requested        [x] Contact Spiritual Care for further referrals     Comments: reviewed chart prior to visit on Surg Tele for spiritual assessment. No family/friends present. Patient was laying in bed and appeared to be in good spirits. She seems to be coping well. Ms Benitez requested  read [id number] to her;  obliged. After reading the scripture, patient shared that when she was a child the egg cartons always had a passage from Psalm 119. She shared reading the passages is how she came to know this psalm so well. It seems to be giving her comfort at this time. She expressed no spiritual concerns, but was receptive to assurance of prayer. Visited ended when  received a page.      JC Tse, [facility], Staff [phone] [facility] Paging Service  [phone]

## 2022-05-23 LAB — MAGNESIUM SERPL-MCNC: 2.3 MG/DL (ref 1.6–2.4)

## 2022-05-23 PROCEDURE — 74011250637 HC RX REV CODE- 250/637: Performed by: INTERNAL MEDICINE

## 2022-05-23 PROCEDURE — 65270000046 HC RM TELEMETRY

## 2022-05-23 PROCEDURE — 74011250636 HC RX REV CODE- 250/636: Performed by: PHYSICIAN ASSISTANT

## 2022-05-23 PROCEDURE — 74011250637 HC RX REV CODE- 250/637: Performed by: HOSPITALIST

## 2022-05-23 PROCEDURE — 97535 SELF CARE MNGMENT TRAINING: CPT

## 2022-05-23 PROCEDURE — 74011000250 HC RX REV CODE- 250: Performed by: INTERNAL MEDICINE

## 2022-05-23 PROCEDURE — 94640 AIRWAY INHALATION TREATMENT: CPT

## 2022-05-23 PROCEDURE — 36415 COLL VENOUS BLD VENIPUNCTURE: CPT

## 2022-05-23 PROCEDURE — 74011250637 HC RX REV CODE- 250/637: Performed by: SPECIALIST

## 2022-05-23 PROCEDURE — 74011250637 HC RX REV CODE- 250/637: Performed by: NURSE PRACTITIONER

## 2022-05-23 PROCEDURE — 83735 ASSAY OF MAGNESIUM: CPT

## 2022-05-23 PROCEDURE — 94760 N-INVAS EAR/PLS OXIMETRY 1: CPT

## 2022-05-23 PROCEDURE — 74011000250 HC RX REV CODE- 250: Performed by: NURSE PRACTITIONER

## 2022-05-23 PROCEDURE — 74011250636 HC RX REV CODE- 250/636: Performed by: NURSE PRACTITIONER

## 2022-05-23 RX ORDER — HYDROCODONE BITARTRATE AND ACETAMINOPHEN 5; 325 MG/1; MG/1
1 TABLET ORAL
Status: DISPENSED | OUTPATIENT
Start: 2022-05-23 | End: 2022-05-25

## 2022-05-23 RX ADMIN — BUTALBITAL, ACETAMINOPHEN, AND CAFFEINE 1 TABLET: 50; 325; 40 TABLET ORAL at 22:08

## 2022-05-23 RX ADMIN — BUDESONIDE 250 MCG: 0.25 INHALANT RESPIRATORY (INHALATION) at 22:14

## 2022-05-23 RX ADMIN — BUTALBITAL, ACETAMINOPHEN, AND CAFFEINE 1 TABLET: 50; 325; 40 TABLET ORAL at 10:53

## 2022-05-23 RX ADMIN — SODIUM CHLORIDE, PRESERVATIVE FREE 10 ML: 5 INJECTION INTRAVENOUS at 13:08

## 2022-05-23 RX ADMIN — FERROUS SULFATE TAB 325 MG (65 MG ELEMENTAL FE) 325 MG: 325 (65 FE) TAB at 08:54

## 2022-05-23 RX ADMIN — CLONAZEPAM 0.5 MG: 0.5 TABLET ORAL at 20:24

## 2022-05-23 RX ADMIN — CLONAZEPAM 0.5 MG: 0.5 TABLET ORAL at 12:57

## 2022-05-23 RX ADMIN — ONDANSETRON 4 MG: 2 INJECTION INTRAMUSCULAR; INTRAVENOUS at 16:36

## 2022-05-23 RX ADMIN — FLUOXETINE HYDROCHLORIDE 60 MG: 20 CAPSULE ORAL at 08:54

## 2022-05-23 RX ADMIN — HYDROCODONE BITARTRATE AND ACETAMINOPHEN 1 TABLET: 5; 325 TABLET ORAL at 02:32

## 2022-05-23 RX ADMIN — PANTOPRAZOLE SODIUM 40 MG: 40 TABLET, DELAYED RELEASE ORAL at 08:54

## 2022-05-23 RX ADMIN — ONDANSETRON HYDROCHLORIDE 4 MG: 2 INJECTION, SOLUTION INTRAMUSCULAR; INTRAVENOUS at 08:54

## 2022-05-23 RX ADMIN — BUTALBITAL, ACETAMINOPHEN, AND CAFFEINE 1 TABLET: 50; 325; 40 TABLET ORAL at 16:50

## 2022-05-23 RX ADMIN — BUTALBITAL, ACETAMINOPHEN, AND CAFFEINE 1 TABLET: 50; 325; 40 TABLET ORAL at 04:23

## 2022-05-23 RX ADMIN — SODIUM CHLORIDE, PRESERVATIVE FREE 10 ML: 5 INJECTION INTRAVENOUS at 22:07

## 2022-05-23 RX ADMIN — HYDROCODONE BITARTRATE AND ACETAMINOPHEN 1 TABLET: 5; 325 TABLET ORAL at 16:36

## 2022-05-23 RX ADMIN — FUROSEMIDE 40 MG: 40 TABLET ORAL at 08:54

## 2022-05-23 RX ADMIN — ONDANSETRON HYDROCHLORIDE 4 MG: 2 INJECTION, SOLUTION INTRAMUSCULAR; INTRAVENOUS at 20:24

## 2022-05-23 RX ADMIN — SUCRALFATE 1 G: 1 TABLET ORAL at 08:55

## 2022-05-23 RX ADMIN — ONDANSETRON HYDROCHLORIDE 4 MG: 2 INJECTION, SOLUTION INTRAMUSCULAR; INTRAVENOUS at 02:23

## 2022-05-23 RX ADMIN — SUCRALFATE 1 G: 1 TABLET ORAL at 22:08

## 2022-05-23 RX ADMIN — SUCRALFATE 1 G: 1 TABLET ORAL at 10:53

## 2022-05-23 RX ADMIN — HYDROXYCHLOROQUINE SULFATE 200 MG: 200 TABLET ORAL at 08:55

## 2022-05-23 RX ADMIN — GABAPENTIN 300 MG: 300 CAPSULE ORAL at 17:01

## 2022-05-23 RX ADMIN — PANTOPRAZOLE SODIUM 40 MG: 40 TABLET, DELAYED RELEASE ORAL at 16:36

## 2022-05-23 RX ADMIN — GABAPENTIN 300 MG: 300 CAPSULE ORAL at 08:54

## 2022-05-23 RX ADMIN — CLONAZEPAM 0.5 MG: 0.5 TABLET ORAL at 05:05

## 2022-05-23 RX ADMIN — SUCRALFATE 1 G: 1 TABLET ORAL at 16:36

## 2022-05-23 RX ADMIN — BUDESONIDE 250 MCG: 0.25 INHALANT RESPIRATORY (INHALATION) at 08:10

## 2022-05-23 RX ADMIN — HYDROCODONE BITARTRATE AND ACETAMINOPHEN 1 TABLET: 5; 325 TABLET ORAL at 20:24

## 2022-05-23 RX ADMIN — HYDROCODONE BITARTRATE AND ACETAMINOPHEN 1 TABLET: 5; 325 TABLET ORAL at 12:57

## 2022-05-23 RX ADMIN — HYDROCODONE BITARTRATE AND ACETAMINOPHEN 1 TABLET: 5; 325 TABLET ORAL at 09:08

## 2022-05-23 RX ADMIN — ONDANSETRON HYDROCHLORIDE 4 MG: 2 INJECTION, SOLUTION INTRAMUSCULAR; INTRAVENOUS at 13:05

## 2022-05-23 RX ADMIN — LIOTHYRONINE SODIUM 5 MCG: 5 TABLET ORAL at 08:55

## 2022-05-23 RX ADMIN — LEVOTHYROXINE SODIUM 150 MCG: 0.15 TABLET ORAL at 05:05

## 2022-05-23 NOTE — PROGRESS NOTES
Pt spent the shift without any new clinical events. Routine nursing care provided as needed.  Report endorsed to Rc Erazo

## 2022-05-23 NOTE — PROGRESS NOTES
Hospitalist Progress Note    NAME: Steven Wyatt   :  1960   MRN:  [id number]     Admit date: 2022    Today's date: 22      Assessment / Plan:    Hypovolemic shock  resolved  Acute blood loss anemia   resolved  GI bleeding from Erosive esophagitis,   Iron deficiency  - Reportedly vomited blood PTA, hypotensive in ED  - S/p 2 L IVF in ED  - S/p 1 unit PRBCs   - EGD    Esophagus:             Lower 1/3 of esophagus with scarred appearing mucosa with some friability seen extending from 30-33 cm from incisors. Slight oozing but no mucosal tear seen. This appeared c/w partially healed LA Grade B erosive esophagitis. No bx performed. There was no other nodularity seen or mucosal lesions in esophagus. There was a moderate sized hiatal hernia with GEJ located at 40 cm form incisors. Stomach:               Normal mucosa throughout the stomach that appeared cylindrical shaped in keeping with prior Gastric sleeve. No ulcers in stomach. Pylorus patent. Duodenum:             Normal mucosa to second portion.    - Avoid NSAIDs  - Continue carafate with PPI  - advance diet   - Iron sucrose 200mg IV x 1 ()  - Started p.o. iron supplement given iron studies compatible with iron deficiency anemia       Deconditioned  DJD, knees  - was on NSAIDs for DJD. Try hydrocodone prn. Baseline walks sometimes by herself but frequently needs cane. Does not drive.      - PT OT eval and treat. DC planning to SNF    Abdominal pain  - Patient reported that she needs premedication for that reason  - LFTs obtained within normal limits  -  CT abdomen showed cholelithiasis without Vidya cystitis  - Physical exam is benign  - Continue previous Protonix 40 mg twice daily    Bradycardia  Hypothyroidism  -TSH 0.10  -Home synthroid reduced from 175 mcg to 150 mcg  -also on cytomel PTA.   Needs repeat TSH in 4 weeks with PCP  - Bradycardia resolved     COPD  -duoneb Q 6 hr  -budesonide neb Q 12     Schizoaffective disorder POA  Recent admit for clonopine/suboxone overdose in [place] several months ago  Chronic pain syndrome  Tylenol  On neurontin   Continue psych meds    Hx of lupus- continue Plaquenil    Cholelithiasis- asymptomatic f/u GI as OP      Code status: Full  Prophylaxis: SCD's  Recommended Disposition:SNF placement    Subjective:     Patient was seen and examined. Chart was reviewed. Patient comfortable in bed claims was on chronic pain meds asking for narcotic pain medications. No other acute issues reported at this time. CT showed GB stones but asymptomatic also lot of stool in colon. Objective:     VITALS:   Last 24hrs VS reviewed since prior progress note. Most recent are:  Patient Vitals for the past 24 hrs:   Temp Pulse Resp BP SpO2   05/23/22 0811 -- -- -- -- 95 %   05/22/22 2000 98.3 °F (36.8 °C) 87 16 120/77 98 %   05/22/22 1600 98.4 °F (36.9 °C) 95 16 121/76 97 %   05/22/22 1138 98.7 °F (37.1 °C) 84 20 134/76 94 %     No intake or output data in the 24 hours ending 05/23/22 0848     Wt Readings from Last 12 Encounters:   05/17/22 56.4 kg (124 lb 5.4 oz)   05/16/22 55.7 kg (122 lb 12.8 oz)   05/10/22 59.9 kg (132 lb)       PHYSICAL EXAM:    I had a face to face encounter and independently examined this patient on 05/23/22 as outlined below:    General: WD WN. EENT:  MMM  Resp:  No accessory muscle use  CV:  Regular  rhythm,  GI:  Soft, Non distended, Non tender  Neurologic:  No focal deficit  Psych:   Not anxious nor agitated  Skin:  No rashes.      LABS:    Pertinent labs include:  Recent Labs     05/22/22  0402 05/21/22  0314   WBC 7.7 8.8   HGB 9.9* 10.1*   HCT 31.6* 32.8*   * 785*     Recent Labs     05/23/22  0238 05/22/22  0402 05/22/22  0359 05/21/22  0314   NA  --  135*  --  137   K  --  3.7  --  3.6   CL  --  102  --  104   CO2  --  28  --  28   GLU  --  105*  --  107*   BUN  --  16  --  17   CREA  --  0.76  -- 0. 72   CA  --  8.8  --  8.6   MG 2.3  --  2.0 2.0   ALB  --  2.8*  --   --    TBILI  --  0.1*  --   --    ALT  --  12  --   --        Signed: Demond Dawson MD

## 2022-05-23 NOTE — PROGRESS NOTES
Comprehensive Nutrition Assessment    Type and Reason for Visit: Reassess    Nutrition Recommendations/Plan:   1. Continue diet as tolerated  2. Recommend adding scheduled bowel regimen (miralax and senna daily)        Nutrition Assessment:  Chart reviewed. Pt with 1 to 1 sitter. Imaging shows cholelithiasis. Pain remains an issue, per MD pt opioid seeking behavior. Appetite good per flowsheet's. Labs reviewed, WNL. Noted no BM since last Friday. As she is on iron and norco and gabapentin she likely needs a scheduled bowel regimen to prevent constipation, nothing available prn currently. Patient Vitals for the past 168 hrs:   % Diet Eaten   05/18/22 1942 76 - 100%          Nutrition Related Findings:    Meds: gapentin, iron, lasix, zofran, protonix, carafate, prn norco.    BM 5/20   Wound Type: None    Current Nutrition Intake & Therapies:  Average Meal Intake: %  Average Supplement Intake: None ordered  ADULT ORAL NUTRITION SUPPLEMENT Lunch; Protein Modular  DIET ONE TIME MESSAGE  ADULT DIET Dysphagia - Minced & Moist    Anthropometric Measures:  Height: 5' 3\" (160 cm)  Ideal Body Weight (IBW): 115 lbs (52 kg)     Current Body Wt:  56.4 kg (124 lb 5.4 oz), 108.1 % IBW. Bed scale  Current BMI (kg/m2): 22                          BMI Category: Normal weight (BMI 18.5-24. 9)    Estimated Daily Nutrient Needs:        Energy (kcal/day): MSJ 1450 (1103 x 1.3)  Weight Used for Protein Requirements: Current  Protein (g/day): 68g (1.2gPro/kg)  Method Used for Fluid Requirements: 1 ml/kcal  Fluid (ml/day): 1450mL    Nutrition Diagnosis:   No nutrition diagnosis at this time     Nutrition Interventions:   Food and/or Nutrient Delivery: Continue current diet  Nutrition Education/Counseling: No recommendations at this time  Coordination of Nutrition Care: Continue to monitor while inpatient       Goals:  Previous Goal Met: Goal(s) achieved  Goals: PO intake 75% or greater,by next RD assessment (and a BM in 3-5 days)       Nutrition Monitoring and Evaluation:   Behavioral-Environmental Outcomes: None identified  Food/Nutrient Intake Outcomes: Food and nutrient intake  Physical Signs/Symptoms Outcomes: Biochemical data,Nutrition focused physical findings,Skin,Weight,GI status    Discharge Planning:    Continue current diet    Carlitos Turcios RD, [facility]  Contact: ext [phone]

## 2022-05-23 NOTE — PROGRESS NOTES
Problem: Self Care Deficits Care Plan (Adult)  Goal: *Acute Goals and Plan of Care (Insert Text)  Description: FUNCTIONAL STATUS PRIOR TO ADMISSION:  Patient poor historian giving conflicting reports of PLOF. Uses cane or walker for ambulation    HOME SUPPORT PRIOR TO ADMISSION: Patient stating she is homeless. Per chart she lives in [place] and was visiting family in [facility]. Per CM, she has an address in [place]. Uncertain if she lives alone and/or has any support. Occupational Therapy Goals  Initiated 5/19/2022  1. Patient will perform grooming at the sink with supervision/set-up within 7 day(s). 2.  Patient will perform bathing at the sink with moderate assistance  within 7 day(s). 3.  Patient will perform lower body dressing with minimal assistance/contact guard assist within 7 day(s). 4.  Patient will perform toilet transfers with supervision/set-up within 7 day(s). 5.  Patient will perform all aspects of toileting with supervision/set-up within 7 day(s). 6.  Patient will participate in upper extremity therapeutic exercise/activities with supervision/set-up for 10 minutes within 7 day(s). 7.  Patient will utilize energy conservation techniques during functional activities with verbal cues within 7 day(s). Outcome: Progressing Towards Goal   OCCUPATIONAL THERAPY TREATMENT  Patient: Latanya Alvarenga (40 y.o. female)  Date: 5/23/2022  Diagnosis: GI bleed [K92.2] <principal problem not specified>  Procedure(s) (LRB):  ESOPHAGOGASTRODUODENOSCOPY (EGD) (N/A) 6 Days Post-Op  Precautions:    Chart, occupational therapy assessment, plan of care, and goals were reviewed. ASSESSMENT  Patient continues with skilled OT services and is progressing towards goals. Pt was supine in bed and continued to ask about seeing the CM today. Pt was SBA for bed mobility, and was able to scoot to EOb and was able to stand with SBA and use of RW.   Pt was encouraged to walk to bathroom but she stated she would not sit down on toilet due to it was too low. OT placed BSC over toilet and she still did not want to sit on toilet. Pt was willing to sit up in chair and left with call bell with in reach. Pt continued to focus on seeing and talking to CM  and stated that she wanted to have palliative come and talk to her. Passed this info onto staff, and CM. Current Level of Function Impacting Discharge (ADLs): min for ADLs, LB              PLAN :  Patient continues to benefit from skilled intervention to address the above impairments. Continue treatment per established plan of care to address goals. Recommend with staff: OOB for meals and walk to bathroom and no longer use BSC    Recommend next OT session: work on standing at sink for grooming and UB ADLs     Recommendation for discharge: (in order for the patient to meet his/her long term goals)  Therapy up to 5 days/week in SNF setting    This discharge recommendation:  Has been made in collaboration with the attending provider and/or case management    IF patient discharges home will need the following DME: tbd       SUBJECTIVE:   Patient stated will you promise to find Collins Parke and let her know I need to see her? Ada Imus    OBJECTIVE DATA SUMMARY:   Cognitive/Behavioral Status:  Neurologic State: Alert  Orientation Level: Oriented X4  Cognition: Follows commands  Perception: Appears intact  Perseveration: Perseverates during conversation  Safety/Judgement: Fall prevention    Functional Mobility and Transfers for ADLs:  Bed Mobility:  Rolling: Stand-by assistance;Supervision  Supine to Sit: Stand-by assistance;Supervision  Sit to Supine: Stand-by assistance;Supervision  Scooting: Supervision    Transfers:  Sit to Stand: Stand-by assistance  Functional Transfers  Bathroom Mobility: Contact guard assistance  Toilet Transfer : Contact guard assistance  Bed to Chair: Contact guard assistance;Stand-by assistance    Balance:  Sitting: Intact  Standing: Impaired; Without support  Standing - Static: Good;Constant support  Standing - Dynamic : Fair;Constant support    ADL Intervention:  Feeding  Feeding Assistance: Independent    Grooming  Grooming Assistance: Set-up  Position Performed: Seated in chair  Washing Face: Set-up  Washing Hands: Set-up  Brushing Teeth: Set-up    Upper Body Bathing  Bathing Assistance: Set-up  Position Performed: Seated in chair    Lower Body Bathing  Bathing Assistance: Contact guard assistance;Stand-by assistance  Perineal  : Contact guard assistance;Stand-by assistance  Position Performed: Standing              Toileting  Toileting Assistance: Contact guard assistance;Stand-by assistance  Bladder Hygiene: Contact guard assistance;Stand-by assistance  Bowel Hygiene: Contact guard assistance;Stand-by assistance    Cognitive Retraining  Safety/Judgement: Fall prevention        Activity Tolerance:   Fair    After treatment patient left in no apparent distress:   Sitting in chair, Call bell within reach, and Bed / chair alarm activated    COMMUNICATION/COLLABORATION:   The patients plan of care was discussed with: Physical therapist and Registered nurse.      China Casas OT  Time Calculation: 20 mins

## 2022-05-23 NOTE — PROGRESS NOTES
End of Shift Note    Bedside shift change report given to Leslee Small RN (oncoming nurse) by Mykel Santana LPN (offgoing nurse). Report included the following information SBAR, Kardex, Intake/Output and MAR    Shift worked:  7a-7p     Shift summary and any significant changes:    CAT scan done today, during infusion of IV contrast her IV blew. Unable to obtain access again-night RN notified. Pain mangement has been an issue today. Concerns for physician to address: Pain management   Zone phone for oncoming shift:  [phone]     Activity:  Activity Level: Up with Assistance  Number times ambulated in hallways past shift: 0  Number of times OOB to chair past shift: 0    Cardiac:   Cardiac Monitoring: No      Cardiac Rhythm: Sinus Rhythm    Access:   Current line(s): no access     Genitourinary:   Urinary status: voiding    Respiratory:   O2 Device: None (Room air)  Chronic home O2 use?: NO  Incentive spirometer at bedside: NO       GI:  Last Bowel Movement Date: 05/20/22  Current diet:  ADULT ORAL NUTRITION SUPPLEMENT Lunch; Protein Modular  DIET ONE TIME MESSAGE  ADULT DIET Dysphagia - Minced & Moist  Passing flatus: YES  Tolerating current diet: YES       Pain Management:   Patient states pain is manageable on current regimen: NO    Skin:  Quan Score: 20  Interventions: increase time out of bed, PT/OT consult and nutritional support     Patient Safety:  Fall Score:  Total Score: 4  Interventions: gripper socks, pt to call before getting OOB and stay with me (per policy)  High Fall Risk: Yes    Length of Stay:  Expected LOS: 4d 21h  Actual LOS: 6      Mykel Santana LPN

## 2022-05-23 NOTE — PROGRESS NOTES
Problem: Pressure Injury - Risk of  Goal: *Prevention of pressure injury  Description: Document Quan Scale and appropriate interventions in the flowsheet. Outcome: Progressing Towards Goal  Note: Pressure Injury Interventions:       Moisture Interventions: Minimize layers    Activity Interventions: Increase time out of bed    Mobility Interventions: HOB 30 degrees or less    Nutrition Interventions: Document food/fluid/supplement intake    Friction and Shear Interventions: HOB 30 degrees or less                Problem: Patient Education: Go to Patient Education Activity  Goal: Patient/Family Education  Outcome: Progressing Towards Goal     Problem: Falls - Risk of  Goal: *Absence of Falls  Description: Document Alfredo Fall Risk and appropriate interventions in the flowsheet.   Outcome: Progressing Towards Goal  Note: Fall Risk Interventions:  Mobility Interventions: Patient to call before getting OOB         Medication Interventions: Patient to call before getting OOB    Elimination Interventions: Call light in reach    History of Falls Interventions: Room close to nurse's station         Problem: Patient Education: Go to Patient Education Activity  Goal: Patient/Family Education  Outcome: Progressing Towards Goal     Problem: Breathing Pattern - Ineffective  Goal: *Absence of hypoxia  Outcome: Progressing Towards Goal  Goal: *Use of effective breathing techniques  Outcome: Progressing Towards Goal     Problem: Patient Education: Go to Patient Education Activity  Goal: Patient/Family Education  Outcome: Progressing Towards Goal     Problem: Patient Education: Go to Patient Education Activity  Goal: Patient/Family Education  Outcome: Progressing Towards Goal     Problem: Patient Education: Go to Patient Education Activity  Goal: Patient/Family Education  Outcome: Progressing Towards Goal

## 2022-05-23 NOTE — PROGRESS NOTES
Transition of Care Plan:     RUR:12%  Disposition: SNF w/possible LTC  Follow up appointments:  DME needed:n/a-pt owns a walker  Transportation at [facility] [address] or means to access home:        IM Medicare Letter:2nd IM needed  Is patient a BCPI-A Bundle:                      If yes, was Bundle Letter given?:    Is patient a  and connected with the [place]?                If yes, was [employer] transfer form completed and [facility] notified? Caregiver Contact:, Lina Both [phone]  Discharge Caregiver contacted prior to discharge? Care Conference needed? :         Per patients request, CM will send referral to [facility]   CM will continue to follow.     [place]  Ext [phone]

## 2022-05-24 ENCOUNTER — APPOINTMENT (OUTPATIENT)
Dept: ULTRASOUND IMAGING | Age: 62
DRG: 368 | End: 2022-05-24
Attending: HOSPITALIST
Payer: MEDICARE

## 2022-05-24 PROBLEM — M25.562 CHRONIC PAIN OF BOTH KNEES: Status: ACTIVE | Noted: 2022-05-24

## 2022-05-24 PROBLEM — M25.561 CHRONIC PAIN OF BOTH KNEES: Status: ACTIVE | Noted: 2022-05-24

## 2022-05-24 PROBLEM — F19.90 SUBSTANCE USE DISORDER: Status: ACTIVE | Noted: 2022-05-24

## 2022-05-24 PROBLEM — Z51.5 PALLIATIVE CARE BY SPECIALIST: Status: ACTIVE | Noted: 2022-05-24

## 2022-05-24 PROBLEM — G89.29 CHRONIC PAIN OF BOTH KNEES: Status: ACTIVE | Noted: 2022-05-24

## 2022-05-24 LAB
BASOPHILS # BLD: 0 K/UL (ref 0–0.1)
BASOPHILS NFR BLD: 1 % (ref 0–1)
DIFFERENTIAL METHOD BLD: ABNORMAL
EOSINOPHIL # BLD: 0.1 K/UL (ref 0–0.4)
EOSINOPHIL NFR BLD: 1 % (ref 0–7)
ERYTHROCYTE [DISTWIDTH] IN BLOOD BY AUTOMATED COUNT: 14.8 % (ref 11.5–14.5)
GLUCOSE BLD STRIP.AUTO-MCNC: 120 MG/DL (ref 65–117)
HCT VFR BLD AUTO: 29.5 % (ref 35–47)
HGB BLD-MCNC: 9.1 G/DL (ref 11.5–16)
IMM GRANULOCYTES # BLD AUTO: 0.1 K/UL (ref 0–0.04)
IMM GRANULOCYTES NFR BLD AUTO: 1 % (ref 0–0.5)
LYMPHOCYTES # BLD: 2.1 K/UL (ref 0.8–3.5)
LYMPHOCYTES NFR BLD: 30 % (ref 12–49)
MAGNESIUM SERPL-MCNC: 2.3 MG/DL (ref 1.6–2.4)
MCH RBC QN AUTO: 27.7 PG (ref 26–34)
MCHC RBC AUTO-ENTMCNC: 30.8 G/DL (ref 30–36.5)
MCV RBC AUTO: 89.9 FL (ref 80–99)
MONOCYTES # BLD: 0.7 K/UL (ref 0–1)
MONOCYTES NFR BLD: 10 % (ref 5–13)
NEUTS SEG # BLD: 3.9 K/UL (ref 1.8–8)
NEUTS SEG NFR BLD: 57 % (ref 32–75)
NRBC # BLD: 0 K/UL (ref 0–0.01)
NRBC BLD-RTO: 0 PER 100 WBC
PLATELET # BLD AUTO: 596 K/UL (ref 150–400)
PMV BLD AUTO: 8.5 FL (ref 8.9–12.9)
RBC # BLD AUTO: 3.28 M/UL (ref 3.8–5.2)
SERVICE CMNT-IMP: ABNORMAL
WBC # BLD AUTO: 6.8 K/UL (ref 3.6–11)

## 2022-05-24 PROCEDURE — 74011250636 HC RX REV CODE- 250/636: Performed by: HOSPITALIST

## 2022-05-24 PROCEDURE — 74011000250 HC RX REV CODE- 250: Performed by: INTERNAL MEDICINE

## 2022-05-24 PROCEDURE — 36415 COLL VENOUS BLD VENIPUNCTURE: CPT

## 2022-05-24 PROCEDURE — 74011250636 HC RX REV CODE- 250/636: Performed by: PHYSICIAN ASSISTANT

## 2022-05-24 PROCEDURE — 74011250637 HC RX REV CODE- 250/637: Performed by: SPECIALIST

## 2022-05-24 PROCEDURE — 76700 US EXAM ABDOM COMPLETE: CPT

## 2022-05-24 PROCEDURE — 83735 ASSAY OF MAGNESIUM: CPT

## 2022-05-24 PROCEDURE — 74011000258 HC RX REV CODE- 258: Performed by: HOSPITALIST

## 2022-05-24 PROCEDURE — 94640 AIRWAY INHALATION TREATMENT: CPT

## 2022-05-24 PROCEDURE — 76937 US GUIDE VASCULAR ACCESS: CPT

## 2022-05-24 PROCEDURE — 85025 COMPLETE CBC W/AUTO DIFF WBC: CPT

## 2022-05-24 PROCEDURE — 74011250637 HC RX REV CODE- 250/637: Performed by: INTERNAL MEDICINE

## 2022-05-24 PROCEDURE — 65270000046 HC RM TELEMETRY

## 2022-05-24 PROCEDURE — 99223 1ST HOSP IP/OBS HIGH 75: CPT | Performed by: NURSE PRACTITIONER

## 2022-05-24 PROCEDURE — 74011250637 HC RX REV CODE- 250/637: Performed by: NURSE PRACTITIONER

## 2022-05-24 PROCEDURE — 74011250637 HC RX REV CODE- 250/637: Performed by: HOSPITALIST

## 2022-05-24 PROCEDURE — 97116 GAIT TRAINING THERAPY: CPT | Performed by: PHYSICAL THERAPIST

## 2022-05-24 PROCEDURE — 82962 GLUCOSE BLOOD TEST: CPT

## 2022-05-24 PROCEDURE — 74011000250 HC RX REV CODE- 250: Performed by: NURSE PRACTITIONER

## 2022-05-24 RX ORDER — MORPHINE SULFATE 2 MG/ML
2 INJECTION, SOLUTION INTRAMUSCULAR; INTRAVENOUS ONCE
Status: COMPLETED | OUTPATIENT
Start: 2022-05-24 | End: 2022-05-25

## 2022-05-24 RX ADMIN — FLUOXETINE HYDROCHLORIDE 60 MG: 20 CAPSULE ORAL at 08:19

## 2022-05-24 RX ADMIN — CLONAZEPAM 0.5 MG: 0.5 TABLET ORAL at 06:22

## 2022-05-24 RX ADMIN — SODIUM CHLORIDE 125 MG: 9 INJECTION, SOLUTION INTRAVENOUS at 11:06

## 2022-05-24 RX ADMIN — GABAPENTIN 300 MG: 300 CAPSULE ORAL at 08:19

## 2022-05-24 RX ADMIN — HYDROCODONE BITARTRATE AND ACETAMINOPHEN 1 TABLET: 5; 325 TABLET ORAL at 08:48

## 2022-05-24 RX ADMIN — SODIUM CHLORIDE, PRESERVATIVE FREE 10 ML: 5 INJECTION INTRAVENOUS at 20:23

## 2022-05-24 RX ADMIN — SODIUM CHLORIDE, PRESERVATIVE FREE 10 ML: 5 INJECTION INTRAVENOUS at 13:00

## 2022-05-24 RX ADMIN — PANTOPRAZOLE SODIUM 40 MG: 40 TABLET, DELAYED RELEASE ORAL at 17:26

## 2022-05-24 RX ADMIN — ONDANSETRON HYDROCHLORIDE 4 MG: 2 INJECTION, SOLUTION INTRAMUSCULAR; INTRAVENOUS at 02:13

## 2022-05-24 RX ADMIN — HYDROCODONE BITARTRATE AND ACETAMINOPHEN 1 TABLET: 5; 325 TABLET ORAL at 13:38

## 2022-05-24 RX ADMIN — BUTALBITAL, ACETAMINOPHEN, AND CAFFEINE 1 TABLET: 50; 325; 40 TABLET ORAL at 11:08

## 2022-05-24 RX ADMIN — SUCRALFATE 1 G: 1 TABLET ORAL at 11:08

## 2022-05-24 RX ADMIN — SUCRALFATE 1 G: 1 TABLET ORAL at 08:19

## 2022-05-24 RX ADMIN — HYDROXYCHLOROQUINE SULFATE 200 MG: 200 TABLET ORAL at 08:19

## 2022-05-24 RX ADMIN — HYDROCODONE BITARTRATE AND ACETAMINOPHEN 1 TABLET: 5; 325 TABLET ORAL at 05:06

## 2022-05-24 RX ADMIN — BUTALBITAL, ACETAMINOPHEN, AND CAFFEINE 1 TABLET: 50; 325; 40 TABLET ORAL at 05:06

## 2022-05-24 RX ADMIN — FERROUS SULFATE TAB 325 MG (65 MG ELEMENTAL FE) 325 MG: 325 (65 FE) TAB at 08:19

## 2022-05-24 RX ADMIN — BUDESONIDE 250 MCG: 0.25 INHALANT RESPIRATORY (INHALATION) at 08:39

## 2022-05-24 RX ADMIN — ONDANSETRON HYDROCHLORIDE 4 MG: 2 INJECTION, SOLUTION INTRAMUSCULAR; INTRAVENOUS at 13:39

## 2022-05-24 RX ADMIN — GABAPENTIN 300 MG: 300 CAPSULE ORAL at 17:26

## 2022-05-24 RX ADMIN — ONDANSETRON HYDROCHLORIDE 4 MG: 2 INJECTION, SOLUTION INTRAMUSCULAR; INTRAVENOUS at 20:00

## 2022-05-24 RX ADMIN — SUCRALFATE 1 G: 1 TABLET ORAL at 22:18

## 2022-05-24 RX ADMIN — HYDROCODONE BITARTRATE AND ACETAMINOPHEN 1 TABLET: 5; 325 TABLET ORAL at 00:42

## 2022-05-24 RX ADMIN — SUCRALFATE 1 G: 1 TABLET ORAL at 17:26

## 2022-05-24 RX ADMIN — SODIUM CHLORIDE, PRESERVATIVE FREE 10 ML: 5 INJECTION INTRAVENOUS at 05:10

## 2022-05-24 RX ADMIN — LEVOTHYROXINE SODIUM 150 MCG: 0.15 TABLET ORAL at 05:06

## 2022-05-24 RX ADMIN — PANTOPRAZOLE SODIUM 40 MG: 40 TABLET, DELAYED RELEASE ORAL at 08:19

## 2022-05-24 RX ADMIN — ONDANSETRON HYDROCHLORIDE 4 MG: 2 INJECTION, SOLUTION INTRAMUSCULAR; INTRAVENOUS at 08:19

## 2022-05-24 RX ADMIN — CLONAZEPAM 0.5 MG: 0.5 TABLET ORAL at 15:10

## 2022-05-24 RX ADMIN — FUROSEMIDE 40 MG: 40 TABLET ORAL at 08:19

## 2022-05-24 RX ADMIN — BUDESONIDE 250 MCG: 0.25 INHALANT RESPIRATORY (INHALATION) at 20:07

## 2022-05-24 RX ADMIN — LIOTHYRONINE SODIUM 5 MCG: 5 TABLET ORAL at 08:19

## 2022-05-24 RX ADMIN — BUTALBITAL, ACETAMINOPHEN, AND CAFFEINE 1 TABLET: 50; 325; 40 TABLET ORAL at 17:26

## 2022-05-24 RX ADMIN — TRAZODONE HYDROCHLORIDE 25 MG: 50 TABLET ORAL at 02:12

## 2022-05-24 RX ADMIN — HYDROCODONE BITARTRATE AND ACETAMINOPHEN 1 TABLET: 5; 325 TABLET ORAL at 17:26

## 2022-05-24 NOTE — PROGRESS NOTES
Problem: Pressure Injury - Risk of  Goal: *Prevention of pressure injury  Description: Document Quan Scale and appropriate interventions in the flowsheet. Outcome: Progressing Towards Goal  Note: Pressure Injury Interventions:       Moisture Interventions: Minimize layers    Activity Interventions: Increase time out of bed    Mobility Interventions: HOB 30 degrees or less,Turn and reposition approx. every two hours(pillow and wedges)    Nutrition Interventions: Document food/fluid/supplement intake    Friction and Shear Interventions: HOB 30 degrees or less                Problem: Falls - Risk of  Goal: *Absence of Falls  Description: Document Alfredo Fall Risk and appropriate interventions in the flowsheet.   Outcome: Progressing Towards Goal  Note: Fall Risk Interventions:  Mobility Interventions: Communicate number of staff needed for ambulation/transfer,Patient to call before getting OOB,Utilize walker, cane, or other assistive device         Medication Interventions: Patient to call before getting OOB,Teach patient to arise slowly    Elimination Interventions: Patient to call for help with toileting needs,Stay With Me (per policy),Call light in reach    History of Falls Interventions: Room close to nurse's station         Problem: Patient Education: Go to Patient Education Activity  Goal: Patient/Family Education  Outcome: Progressing Towards Goal

## 2022-05-24 NOTE — CONSULTS
Palliative Medicine Consult  Gav[place]: [phone] ([phone]    Patient Name: Ramesh Dahl  YOB: 1960    Date of Initial Consult: 5/24/22  Reason for Consult: pain management  Requesting Provider: Katelynn Echeverria MD   Primary Care Physician: Yesenia, MD Chelsea     SUMMARY:   Ramesh Dahl is a 62 y.o. with a past history of hypothyroid disease, COPD,  iron deficiency anemia followed by , sarcoidosis, lupus, history of bleeding peptic ulcer disease requiring transfusions in the past, gastric sleeve in '13 and required a revision in '20 with mesh placement, schizoaffective d/o, who was admitted on 5/16/2022 from Hematology office with a diagnosis of GIB.     HPI: 5/16: pt was sent to ED from Hematology office with c/o SOB, generalized weakness, fatigue, epigastric abdominal pain, nausea, vomiting, hematemesis, poor oral intake, that started over the past week. She has had multiple episodes of hematemesis that started as BRB and now is dark brown-maroon color. Pt was seen at [facility] on 5/10 where she was noted to have hgb 10.3 and was sent home. Today she is also c/o feeling cold. Admission course: 5/16: in ED, BP 63/40 that improved to 92/60, HR 50 following 2L IVF, hgb 8.3. Admitted to ICU.  5/17: hgb 8.3->6.5->then following 1 unit prbcs 7.4->7.8. Pt angry that pain medication was held for EGD, called endo dept stating they were taking too long and threatened to leave, demanding Dilaudid. (Per psyc note pt denied drug or ETOH abuse however, per 2 year  search she has been filling Suboxone prescriptions since 1/14/21 with last fill 4/20/22.)    EGD 5/17: lower 1/3 of esophagus with scarred appearing mucosa with some friability seen extending from 30-33 cm from incisors. Slight oozing but no mucosal tear seen. This appeared c/w partially healed LA Grade B erosive esophagitis. No bx performed. There was no other nodularity seen or mucosal lesions in esophagus.   There was a moderate sized hiatal hernia with GEJ located at 40 cm form incisors. 5/17: transferred out of ICU. Has required a sitter at bedside, continues to demand pain and other medications on her terms. 5/21: pt received one 2mg IV morphine for pain in addition to Norco 5/325mg q. 6 hours. 5/22: pt received one 5mg oxycodone for pain  In addition to Norco 5/325mg q. 6 hours. 5/24: pt reports \"I hurt all over. \" mostly her knees that were supposed to be replaced prior to [address] Current medical issues leading to Palliative Medicine involvement include: drug seeking behavior. Psychosocial: lives in [place] with  Mahogany Benites, unclear if still  but living in same house. Uses 02 at home prn, also smokes. Nelson Tamayo reports she brought pt to [place] for a \"vacation\", during which pt was significantly debilitated, not safe to be alone, sometimes barely able to ambulate, wouldn't allow her to cook due to visual impairment. Sahra bought pt a train ticket to go back home to [place] but pt refused to go, instead came to our ER 5/10/22. She has been admitted to several LTC facilities in [place] but would leave because she didn't like them. PALLIATIVE DIAGNOSES:   1. Abdominal pain  2. Hypovolemic shock/hypotension  3. Acute anemia due to UGIB and chronic anemia due to iron deficiency  4. Bradycardia   5. Hematemesis   6. Legally blind  7. Physical debility   8. bilat shoulder replacements  9. Suicide attempt (per attending note 5/19 indicates pt admitted for clonazempame/suboxone overdose in [place])  10. OUD: Suboxone 12/3mg film daily  11. Chronic pain syndrome: bilateral knees osteoarthritis, had bilat shoulder replacements     PLAN:   1. Prior to visit, I completed an extensive review of patient's medical records, including medical documentation, vital signs, MARs, and results of various labs and other diagnostics.  I also spoke with patient's nurse Abeba Anderson, attending Dr. John Novoa, addiction medicine physician Dr. Darwin Mustafa, [facility] manager Tirso Angeles,  of pt's PCP Dr. Mackenzie Dominique and cousin Cite Dell Lauren. 2. Per PCP, they have been prescribing pt Suboxone 12/3mg film daily for OUD; they have watched Mary Betancur decline over many decades during which she was given opioids frequently, then started misusing them so they started her on suboxone and daily (?) Toradol injections in their office. 3. Pt reports medical problems started when she had a biopsy in 1992 that showed she had sarcoidosis, 2013 gastric sleeve, that she has \"smooth muscle antigen that means I will get scarring, spinal stenosis, both shoulders replaced, and she was living in LTC for past 5 months \"because I keep getting PNA. \"  She states she has been prescribed Suboxone for the shoulder and knee pain. She has been getting Norco 5/325mg q. 4 hours over the past few days. Pt requesting Toradol for discharge, I explained to her that she can never take NSAIDs due to her GI issues and that sometimes suboxone can be prescribed to help pain by multiple doses daily. 1. Per , he does not believe that her GI condition warrants the need for opioids. 2. Dr. Reed Loyalton will see pt later today or tomorrow to assist with restarting suboxone for discharge. 3.  will contact [address] to make sure they will take pt if she is getting suboxone. Otherwise we will have to look at other discharge options. 4. DISPOSITION: pt stated her plan was to go to rehab, then stay for LTC. Counseled pt that her medicaid is [place] so it won't pay for LTC in [place]. 5. Initial consult note routed to primary continuity provider and/or primary health care team members  6.  Communicated plan of care with: Palliative IDTD[address] [facility] Team     GOALS OF CARE / TREATMENT PREFERENCES:     GOALS OF CARE:  Rehab then home       TREATMENT PREFERENCES:   Code Status: Full Code       HISTORY:     History obtained from: patient     The patient is:   [x] Verbal and participatory  Not a good historian  [] Non-participatory due to:        Clinical Pain Assessment (nonverbal scale for severity on nonverbal patients):   Clinical Pain Assessment  Severity: 6  Location: both knees  Character: \"they just hurt\"  Duration: years  Effect: can't walk well  Factors: any movement  Frequency: constant     Activity (Movement): Restless, excessive activity and/or withdrawal reflexes    Duration: for how long has pt been experiencing pain (e.g., 2 days, 1 month, years)  Frequency: how often pain is an issue (e.g., several times per day, once every few days, constant)     FUNCTIONAL ASSESSMENT:     Palliative Performance Scale (PPS):  PPS: 30       PSYCHOSOCIAL/SPIRITUAL SCREENING:     Palliative IDT has assessed this patient for cultural preferences / practices and a referral made as appropriate to needs (Cultural Services, Patient Advocacy, Ethics, etc.)    Any spiritual / Moravian concerns:  [] Yes /  [x] No   If \"Yes\" to discuss with pastoral care during IDT     Does caregiver feel burdened by caring for their loved one:   [] Yes /  [x] No /  [] No Caregiver Present    If \"Yes\" to discuss with social work during IDT    Anticipatory grief assessment:   [x] Normal  / [] Maladaptive     If \"Maladaptive\" to discuss with social work during IDT    ESAS Anxiety: Anxiety: 3    ESAS Depression: Depression: 4        REVIEW OF SYSTEMS:     Positive and pertinent negative findings in ROS are noted above in HPI. The following systems were [x] reviewed / [] unable to be reviewed as noted in HPI  Other findings are noted below. Systems: constitutional, ears/nose/mouth/throat, respiratory, gastrointestinal, genitourinary, musculoskeletal, integumentary, neurologic, psychiatric, endocrine. Positive findings noted below.   Modified ESAS Completed by: provider   Fatigue: 7 Drowsiness: 4   Depression: 4 Pain: 6   Anxiety: 3 Nausea: 0   Anorexia: 3 Dyspnea: 0     Constipation: No     Stool Occurrence(s): 1        PHYSICAL EXAM:     From RN flowsheet:  Wt Readings from Last 3 Encounters:   05/17/22 124 lb 5.4 oz (56.4 kg)   05/16/22 122 lb 12.8 oz (55.7 kg)   05/10/22 132 lb (59.9 kg)     Blood pressure 102/63, pulse 76, temperature 98.3 °F (36.8 °C), resp. rate 16, height 5' 3\" (1.6 m), weight 124 lb 5.4 oz (56.4 kg), SpO2 97 %, not currently breastfeeding. Pain Scale 1: Numeric (0 - 10)  Pain Intensity 1: 7  Pain Onset 1: chronic  Pain Location 1: Generalized  Pain Orientation 1: Anterior  Pain Description 1: Aching  Pain Intervention(s) 1: Medication (see MAR)  Last bowel movement, if known:     Constitutional: frail, chronically ill appearing, appears older than stated age  Eyes: difficulty keeping eyes open  ENMT: no nasal discharge, moist mucous membranes  Cardiovascular: regular rhythm, distal pulses intact  Respiratory: breathing not labored, symmetric  Gastrointestinal: soft non-tender, +bowel sounds  Musculoskeletal: no deformity, no tenderness to palpation  Skin: warm, dry  Neurologic: following commands, moving all extremities  Psychiatric: full affect, no hallucinations          HISTORY:     Active Problems:    Iron deficiency anemia (5/16/2022)      GI bleed (5/16/2022)      Hematemesis (5/17/2022)      Past Medical History:   Diagnosis Date    Arthritis     Iron deficiency     Lupus ([facility] [address]     Osteoporosis     Sarcoidosis       Past Surgical History:   Procedure Laterality Date    HX GASTRIC BYPASS      gastric sleeve    HX HERNIA REPAIR  2013    needs a repat she says      History reviewed. No pertinent family history. History reviewed, no pertinent family history.   Social History     Tobacco Use    Smoking status: Former Smoker     Types: Cigarettes    Smokeless tobacco: Never Used   Substance Use Topics    Alcohol use: Never     Allergies   Allergen Reactions    Haldol [Haloperidol Lactate] Anaphylaxis    Risperidone Anaphylaxis    Clarithromycin Other (comments)     Urticaria      Current Facility-Administered Medications   Medication Dose Route Frequency    HYDROcodone-acetaminophen (NORCO) 5-325 mg per tablet 1 Tablet  1 Tablet Oral Q4H PRN    ferric gluconate (FERRLECIT) 125 mg in 0.9% sodium chloride 100 mL IVPB  125 mg IntraVENous DAILY    ferrous sulfate tablet 325 mg  1 Tablet Oral DAILY WITH BREAKFAST    clonazePAM (KlonoPIN) tablet 0.5 mg  0.5 mg Oral Q8H PRN    pantoprazole (PROTONIX) tablet 40 mg  40 mg Oral ACB&D    butalbital-acetaminophen-caffeine (FIORICET, ESGIC) -40 mg per tablet 1 Tablet  1 Tablet Oral Q6H PRN    levothyroxine (SYNTHROID) tablet 150 mcg  150 mcg Oral 6am    albuterol-ipratropium (DUO-NEB) 2.5 MG-0.5 MG/3 ML  3 mL Nebulization Q6H PRN    liothyronine (CYTOMEL) tablet 5 mcg  5 mcg Oral DAILY    traZODone (DESYREL) tablet 25 mg  25 mg Oral QHS PRN    furosemide (LASIX) tablet 40 mg  40 mg Oral DAILY    hydrOXYchloroQUINE (PLAQUENIL) tablet 200 mg  200 mg Oral DAILY    nicotine (NICODERM CQ) 21 mg/24 hr patch 1 Patch  1 Patch TransDERmal DAILY    ondansetron (ZOFRAN) injection 4 mg  4 mg IntraVENous Q6H    sucralfate (CARAFATE) tablet 1 g  1 g Oral AC&HS    FLUoxetine (PROzac) capsule 60 mg  60 mg Oral DAILY    gabapentin (NEURONTIN) capsule 300 mg  300 mg Oral BID    lidocaine 4 % patch 1 Patch  1 Patch TransDERmal Q24H    budesonide (PULMICORT) 250 mcg/2ml nebulizer susp  250 mcg Nebulization BID RT    sodium chloride (NS) flush 5-40 mL  5-40 mL IntraVENous Q8H    sodium chloride (NS) flush 5-40 mL  5-40 mL IntraVENous PRN    ondansetron (ZOFRAN ODT) tablet 4 mg  4 mg Oral Q8H PRN    Or    ondansetron (ZOFRAN) injection 4 mg  4 mg IntraVENous Q6H PRN    0.9% sodium chloride infusion 250 mL  250 mL IntraVENous PRN          LAB AND IMAGING FINDINGS:     Lab Results   Component Value Date/Time    WBC 6.8 05/24/2022 02:55 AM    HGB 9.1 (L) 05/24/2022 02:55 AM    PLATELET 638 (H) 73/29/6296 02:55 AM     Lab Results   Component Value Date/Time    Sodium 135 (L) 05/22/2022 04:02 AM    Potassium 3.7 05/22/2022 04:02 AM    Chloride 102 05/22/2022 04:02 AM    CO2 28 05/22/2022 04:02 AM    BUN 16 05/22/2022 04:02 AM    Creatinine 0.76 05/22/2022 04:02 AM    Calcium 8.8 05/22/2022 04:02 AM    Magnesium 2.3 05/24/2022 02:55 AM    Phosphorus 2.7 05/18/2022 05:30 AM      Lab Results   Component Value Date/Time    Alk. phosphatase 63 05/22/2022 04:02 AM    Protein, total 6.5 05/22/2022 04:02 AM    Albumin 2.8 (L) 05/22/2022 04:02 AM    Globulin 3.7 05/22/2022 04:02 AM     Lab Results   Component Value Date/Time    INR 1.0 05/18/2022 05:30 AM    Prothrombin time 10.8 05/18/2022 05:30 AM      Lab Results   Component Value Date/Time    Iron 18 (L) 05/22/2022 03:59 AM    TIBC 340 05/22/2022 03:59 AM    Iron % saturation 5 (L) 05/22/2022 03:59 AM    Ferritin 17 05/19/2022 03:24 AM      No results found for: PH, PCO2, PO2  No components found for: GLPOC   No results found for: CPK, CKMB             Total time: 120  Counseling / coordination time, spent as noted above: 90  > 50% counseling / coordination?: y    Prolonged service was provided for  []30 min   []75 min in face to face time in the presence of the patient, spent as noted above. Time Start:   Time End:   Note: this can only be billed with 73279 (initial) or 39190 (follow up). If multiple start / stop times, list each separately.

## 2022-05-24 NOTE — PROGRESS NOTES
End of Shift Note    Bedside shift change report given to [address]  (oncoming nurse) by Jerman Acuna LPN (offgoing nurse). Report included the following information SBAR, Intake/Output, MAR, Accordion and Recent Results    Shift worked:  7p-7a     Shift summary and any significant changes:    Pt c/o nausea and lower back pain, given Zofran x2  and Norco x3 , Plt - 596, Pt has concern regarding housing   Concerns for physician to address: Pain management      Zone phone for oncoming shift:   [phone]       Activity:  Activity Level: Up with Assistance  Number times ambulated in hallways past shift: 0  Number of times OOB to chair past shift: 0    Cardiac:   Cardiac Monitoring: No        Access:   Current line(s): PIV     Genitourinary:   Urinary status: voiding    Respiratory:   O2 Device: None (Room air)  Chronic home O2 use?: NO  Incentive spirometer at bedside: NO       GI:  Last Bowel Movement Date: 05/23/22  Current diet:  ADULT ORAL NUTRITION SUPPLEMENT Lunch; Protein Modular  DIET ONE TIME MESSAGE  ADULT DIET Dysphagia - Minced & Moist  Passing flatus: No  Tolerating current diet: YES       Pain Management:   Patient states pain is manageable on current regimen: YES    Skin:  Quan Score: 20  Interventions: increase time out of bed    Patient Safety:  Fall Score:  Total Score: 3  Interventions: gripper socks, pt to call before getting OOB and stay with me (per policy)  High Fall Risk: Yes    Length of Stay:  Expected LOS: 4d 21h  Actual LOS: [id number], LPN

## 2022-05-24 NOTE — PROGRESS NOTES
Problem: Mobility Impaired (Adult and Pediatric)  Goal: *Acute Goals and Plan of Care (Insert Text)  Description: FUNCTIONAL STATUS PRIOR TO ADMISSION: Patient poor historian giving conflicting reports of PLOF. Uses cane or walker for ambulation    HOME SUPPORT PRIOR TO ADMISSION: Patient stating she is homeless. Per chart she lives in [place] and was visiting family in [place]. Per CM, she has an address in [place]. Uncertain if she lives alone and/or has any support. Physical Therapy Goals  Initiated 5/19/2022  1. Patient will move from supine to sit and sit to supine , scoot up and down, and roll side to side in bed with independence within 7 day(s). 2.  Patient will transfer from bed to chair and chair to bed with supervision/set-up using the least restrictive device within 7 day(s). 3.  Patient will perform sit to stand with supervision/set-up within 7 day(s). 4.  Patient will ambulate with supervision/set-up for 100 feet with the least restrictive device within 7 day(s). 5.  Patient will ascend/descend 4 stairs with 1 handrail(s) with supervision/set-up within 7 day(s). Outcome: Progressing Towards Goal   PHYSICAL THERAPY TREATMENT  Patient: Saul Askew (21 y.o. female)  Date: 5/24/2022  Diagnosis: GI bleed [K92.2] <principal problem not specified>  Procedure(s) (LRB):  ESOPHAGOGASTRODUODENOSCOPY (EGD) (N/A) 7 Days Post-Op  Precautions:    Chart, physical therapy assessment, plan of care and goals were reviewed. ASSESSMENT  Patient continues with skilled PT services and is progressing towards goals. Patient requiring increased encouragement initially to participate in therapy. She demonstrates improvements in overall mobility requiring only SBA for transfers and ambulation. Gait is mildly antalgic with fluctuating step length but steady overall with no overt LOB noted. Patient only willing to ambulate 25 feet in room today. She remained up in chair at end of session.    Patient requesting palliative consult, stating \" I'm just done with all of this\", \"you know if they don't address my stomach, I'm just going to come right back to the emergency room\"- case management and nurse notified. Current Level of Function Impacting Discharge (mobility/balance): SBA             PLAN :  Patient continues to benefit from skilled intervention to address the above impairments. Continue treatment per established plan of care. to address goals. Recommendation for discharge: (in order for the patient to meet his/her long term goals)  Therapy up to 5 days/week in SNF setting    This discharge recommendation:  Has been made in collaboration with the attending provider and/or case management    IF patient discharges home will need the following DME: patient owns DME required for discharge       SUBJECTIVE:   Patient stated I need my headache medicine.     OBJECTIVE DATA SUMMARY:   Critical Behavior:  Neurologic State: Alert,Appropriate for age  Orientation Level: Oriented X4  Cognition: Appropriate safety awareness  Safety/Judgement: Fall prevention  Functional Mobility Training:  Bed Mobility:  Rolling: Supervision  Supine to Sit: Supervision     Scooting: Supervision        Transfers:  Sit to Stand: Stand-by assistance  Stand to Sit: Stand-by assistance        Bed to Chair: Stand-by assistance                    Balance:  Sitting: Intact  Standing: Impaired  Standing - Static: Good  Standing - Dynamic : Good;Constant support  Ambulation/Gait Training:  Distance (ft): 25 Feet (ft)  Assistive Device: Walker, rolling;Gait belt  Ambulation - Level of Assistance: Stand-by assistance        Gait Abnormalities: Ataxic;Decreased step clearance (fluctuating)        Base of Support: Widened     Speed/Nahomy: Pace decreased (<100 feet/min)  Step Length: Left shortened;Right shortened               Gait is slow but steady overall today;     Activity Tolerance:   Fair    After treatment patient left in no apparent distress:   Sitting in chair, Call bell within reach, and Bed / chair alarm activated    COMMUNICATION/COLLABORATION:   The patients plan of care was discussed with: Occupational therapist, Registered nurse, and Case management.      Christopher Arana, PT   Time Calculation: 17 mins

## 2022-05-24 NOTE — PROGRESS NOTES
Transition of Care Plan:     RUR:12%  Disposition: SNF w/possible LTC  Follow up appointments:  DME needed:n/a-pt owns a walker  Transportation at [facility] [address] or means to access home:        IM Medicare Letter:2nd IM needed  Is patient a BCPI-A Bundle:                      If yes, was Bundle Letter given?:    Is patient a  and connected with the [place]?                If yes, was [place] transfer form completed and [facility] notified? Caregiver Contact:Panchito [phone]  Discharge Caregiver contacted prior to discharge? Care Conference needed? :         Per pt's request, CM sent a referral to [facility]. CM will continue to follow.     Pablo Jorgensen  Ext [phone]

## 2022-05-24 NOTE — PROGRESS NOTES
End of Shift Note    Bedside shift change report given to Disha Dueñas RN (oncoming nurse) by Pam Johnson, RN (offgoing nurse). Report included the following information SBAR, Kardex, Intake/Output and MAR    Shift worked:  7a-3p     Shift summary and any significant changes:    Patient states pain medication doesn't work. Calls for norco, fiorocet, and klonopin frequently. Says her pain is \"everywhere. \" Made comments that she needs palliative care because hospitalists \"aren't real doctors. \" Palliative care ordered. Right IV infiltrated, endurance catheter placed in left arm by PICC team.   Concerns for physician to address:    Zone phone for oncoming shift:  [phone]     Activity:  Activity Level: Up with Assistance  Number times ambulated in hallways past shift: 0  Number of times OOB to chair past shift: 0    Cardiac:   Cardiac Monitoring: No      Cardiac Rhythm: Sinus Jermain    Access:   Current line(s): no access     Genitourinary:   Urinary status: voiding    Respiratory:   O2 Device: None (Room air)  Chronic home O2 use?: NO  Incentive spirometer at bedside: NO       GI:  Last Bowel Movement Date: 05/23/22  Current diet:  ADULT ORAL NUTRITION SUPPLEMENT Lunch; Protein Modular  DIET ONE TIME MESSAGE  ADULT DIET Dysphagia - Minced & Moist  Passing flatus: YES  Tolerating current diet: YES       Pain Management:   Patient states pain is manageable on current regimen: NO    Skin:  Quan Score: 20  Interventions: increase time out of bed, PT/OT consult and nutritional support     Patient Safety:  Fall Score:  Total Score: 3  Interventions: gripper socks, pt to call before getting OOB and stay with me (per policy)  High Fall Risk: Yes    Length of Stay:  Expected LOS: 4d 14h  Actual LOS: 8      Pam Staff, RN

## 2022-05-24 NOTE — PROGRESS NOTES
Hospitalist Progress Note    NAME: Nasima Cat   :  1960   MRN:  [id number]     Admit date: 2022    Today's date: 22      Assessment / Plan:    Hypovolemic shock  resolved  Acute blood loss anemia   resolved  GI bleeding from Erosive esophagitis,   Iron deficiency  - Reportedly vomited blood PTA, hypotensive in ED  - S/p 2 L IVF in ED  - S/p 1 unit PRBCs   - EGD    Esophagus:             Lower 1/3 of esophagus with scarred appearing mucosa with some friability seen extending from 30-33 cm from incisors. Slight oozing but no mucosal tear seen. This appeared c/w partially healed LA Grade B erosive esophagitis. No bx performed. There was no other nodularity seen or mucosal lesions in esophagus. There was a moderate sized hiatal hernia with GEJ located at 40 cm form incisors. Stomach:               Normal mucosa throughout the stomach that appeared cylindrical shaped in keeping with prior Gastric sleeve. No ulcers in stomach. Pylorus patent. Duodenum:             Normal mucosa to second portion.    - Avoid NSAIDs  - Continue carafate with PPI  - advance diet   - Iron sucrose 200mg IV x 1 ()  - Started p.o. iron supplement given iron studies compatible with iron deficiency anemia       Deconditioned  DJD, knees  - was on NSAIDs for DJD. Try hydrocodone prn. Baseline walks sometimes by herself but frequently needs cane. Does not drive.      - PT OT eval and treat. DC planning to SNF    Abdominal pain  - Patient reported that she needs premedication for that reason  - LFTs obtained within normal limits  -  CT abdomen showed cholelithiasis without Vidya cystitis  - Physical exam is benign  - Continue previous Protonix 40 mg twice daily    Bradycardia  Hypothyroidism  -TSH 0.10  -Home synthroid reduced from 175 mcg to 150 mcg  -also on cytomel PTA.   Needs repeat TSH in 4 weeks with PCP  - Bradycardia resolved     COPD  -duoneb Q 6 hr  -budesonide neb Q 12     Schizoaffective disorder POA  Recent admit for clonopine/suboxone overdose in [address] [place] [address] Avenue several months ago  Chronic pain syndrome-palliative consulted for help  Tylenol  On neurontin   Continue psych meds  Was seen by psych but no new recommendations    Hx of lupus- continue Plaquenil    Cholelithiasis-  C/o abd pain will get ABd U/S and GI as OP if unremarkable. Code status: Full  Prophylaxis: SCD's  Recommended Disposition:SNF placement ending once medically stable 24 to 48 hours    Subjective:     Patient was seen and examined. Chart was reviewed. She continues to complain of abdominal pain but comfortable in bed. She keeps asking for palliative care consult to address her pain medications. Objective:     VITALS:   Last 24hrs VS reviewed since prior progress note. Most recent are:  Patient Vitals for the past 24 hrs:   Temp Pulse Resp BP SpO2   05/24/22 0839 -- -- -- -- 98 %   05/24/22 0806 97.8 °F (36.6 °C) 68 14 108/66 98 %   05/24/22 0427 98.3 °F (36.8 °C) 66 16 (!) 93/46 100 %   05/23/22 2214 -- -- -- -- 98 %   05/23/22 2026 99 °F (37.2 °C) 81 18 113/73 97 %   05/23/22 1524 98.1 °F (36.7 °C) 81 16 121/87 98 %   05/23/22 1134 98.9 °F (37.2 °C) 69 16 120/77 98 %       Intake/Output Summary (Last 24 hours) at 5/24/2022 1009  Last data filed at 5/24/2022 0500  Gross per 24 hour   Intake 480 ml   Output 150 ml   Net 330 ml        Wt Readings from Last 12 Encounters:   05/17/22 56.4 kg (124 lb 5.4 oz)   05/16/22 55.7 kg (122 lb 12.8 oz)   05/10/22 59.9 kg (132 lb)       PHYSICAL EXAM:    I had a face to face encounter and independently examined this patient on 05/24/22 as outlined below:    General: WD WN. EENT:  MMM  Resp:  No accessory muscle use  CV:  Regular  rhythm,  GI:  Soft, Non distended, Non tender  Neurologic:  No focal deficit  Psych:   Not anxious nor agitated  Skin:  No rashes.      LABS:    Pertinent labs include:  Recent Labs 05/24/22 0255 05/22/22 0402   WBC 6.8 7.7   HGB 9.1* 9.9*   HCT 29.5* 31.6*   * 695*     Recent Labs     05/24/22 0255 05/23/22 0238 05/22/22 0402 05/22/22 0359   NA  --   --  135*  --    K  --   --  3.7  --    CL  --   --  102  --    CO2  --   --  28  --    GLU  --   --  105*  --    BUN  --   --  16  --    CREA  --   --  0.76  --    CA  --   --  8.8  --    MG 2.3 2.3  --  2.0   ALB  --   --  2.8*  --    TBILI  --   --  0.1*  --    ALT  --   --  12  --        Signed: Katelynn Echeverria MD

## 2022-05-24 NOTE — PROGRESS NOTES
Endurance Catheter insertion note     Inserted 22 G, 6 cm long  Endurance Extended Dwell Peripheral Catheter into left forearm using ultrasound guidance and sterile technique. Positive blood return. Patient tolerated procedure well. Denies questions or concerns at this time. The Endurance catheter is an extended dwell peripheral catheter and may remain in place 29 days. Blood samples can be obtained from this catheter. To obtain labs, a tourniquet may be used, flush with 10ml NS, waste 3ml, draw labs, flush with 20ml NS. Dressings needs to be changed with central line dressing kit using bio patch and stat lock every 7 days by nurse. Primary nurse, Amira Anderson, RN notified. Justin Tovar RN .   Vascular Access Team. PICC Nurse

## 2022-05-24 NOTE — PROGRESS NOTES
Problem: Pressure Injury - Risk of  Goal: *Prevention of pressure injury  Description: Document Quan Scale and appropriate interventions in the flowsheet.   Outcome: Progressing Towards Goal  Note: Pressure Injury Interventions:       Moisture Interventions: Minimize layers    Activity Interventions: Increase time out of bed    Mobility Interventions: HOB 30 degrees or less    Nutrition Interventions: Offer support with meals,snacks and hydration    Friction and Shear Interventions: HOB 30 degrees or less

## 2022-05-25 PROBLEM — K92.0 HEMATEMESIS: Status: RESOLVED | Noted: 2022-05-17 | Resolved: 2022-05-25

## 2022-05-25 PROBLEM — K92.2 GI BLEED: Status: RESOLVED | Noted: 2022-05-16 | Resolved: 2022-05-25

## 2022-05-25 PROBLEM — D50.9 IRON DEFICIENCY ANEMIA: Status: RESOLVED | Noted: 2022-05-16 | Resolved: 2022-05-25

## 2022-05-25 LAB
ALBUMIN SERPL-MCNC: 2.8 G/DL (ref 3.5–5)
ANION GAP SERPL CALC-SCNC: 5 MMOL/L (ref 5–15)
BASOPHILS # BLD: 0.1 K/UL (ref 0–0.1)
BASOPHILS NFR BLD: 1 % (ref 0–1)
BUN SERPL-MCNC: 11 MG/DL (ref 6–20)
BUN/CREAT SERPL: 16 (ref 12–20)
CALCIUM SERPL-MCNC: 8.8 MG/DL (ref 8.5–10.1)
CHLORIDE SERPL-SCNC: 103 MMOL/L (ref 97–108)
CO2 SERPL-SCNC: 30 MMOL/L (ref 21–32)
COVID-19 RAPID TEST, COVR: NOT DETECTED
CREAT SERPL-MCNC: 0.7 MG/DL (ref 0.55–1.02)
DIFFERENTIAL METHOD BLD: ABNORMAL
EOSINOPHIL # BLD: 0.1 K/UL (ref 0–0.4)
EOSINOPHIL NFR BLD: 1 % (ref 0–7)
ERYTHROCYTE [DISTWIDTH] IN BLOOD BY AUTOMATED COUNT: 14.8 % (ref 11.5–14.5)
GLUCOSE SERPL-MCNC: 99 MG/DL (ref 65–100)
HCT VFR BLD AUTO: 29.9 % (ref 35–47)
HGB BLD-MCNC: 9.2 G/DL (ref 11.5–16)
IMM GRANULOCYTES # BLD AUTO: 0 K/UL (ref 0–0.04)
IMM GRANULOCYTES NFR BLD AUTO: 0 % (ref 0–0.5)
LYMPHOCYTES # BLD: 1.9 K/UL (ref 0.8–3.5)
LYMPHOCYTES NFR BLD: 32 % (ref 12–49)
MAGNESIUM SERPL-MCNC: 2.3 MG/DL (ref 1.6–2.4)
MCH RBC QN AUTO: 27.8 PG (ref 26–34)
MCHC RBC AUTO-ENTMCNC: 30.8 G/DL (ref 30–36.5)
MCV RBC AUTO: 90.3 FL (ref 80–99)
MONOCYTES # BLD: 0.6 K/UL (ref 0–1)
MONOCYTES NFR BLD: 11 % (ref 5–13)
NEUTS SEG # BLD: 3.1 K/UL (ref 1.8–8)
NEUTS SEG NFR BLD: 55 % (ref 32–75)
NRBC # BLD: 0 K/UL (ref 0–0.01)
NRBC BLD-RTO: 0 PER 100 WBC
PHOSPHATE SERPL-MCNC: 3.6 MG/DL (ref 2.6–4.7)
PLATELET # BLD AUTO: 535 K/UL (ref 150–400)
PMV BLD AUTO: 8.5 FL (ref 8.9–12.9)
POTASSIUM SERPL-SCNC: 3.6 MMOL/L (ref 3.5–5.1)
RBC # BLD AUTO: 3.31 M/UL (ref 3.8–5.2)
SODIUM SERPL-SCNC: 138 MMOL/L (ref 136–145)
SOURCE, COVRS: NORMAL
WBC # BLD AUTO: 5.7 K/UL (ref 3.6–11)

## 2022-05-25 PROCEDURE — 74011250637 HC RX REV CODE- 250/637: Performed by: HOSPITALIST

## 2022-05-25 PROCEDURE — 83735 ASSAY OF MAGNESIUM: CPT

## 2022-05-25 PROCEDURE — 97116 GAIT TRAINING THERAPY: CPT

## 2022-05-25 PROCEDURE — 74011000250 HC RX REV CODE- 250: Performed by: INTERNAL MEDICINE

## 2022-05-25 PROCEDURE — 74011250636 HC RX REV CODE- 250/636: Performed by: PHYSICIAN ASSISTANT

## 2022-05-25 PROCEDURE — 80069 RENAL FUNCTION PANEL: CPT

## 2022-05-25 PROCEDURE — 74011250636 HC RX REV CODE- 250/636: Performed by: NURSE PRACTITIONER

## 2022-05-25 PROCEDURE — 74011250637 HC RX REV CODE- 250/637: Performed by: NURSE PRACTITIONER

## 2022-05-25 PROCEDURE — 74011250636 HC RX REV CODE- 250/636: Performed by: HOSPITALIST

## 2022-05-25 PROCEDURE — 65270000046 HC RM TELEMETRY

## 2022-05-25 PROCEDURE — 74011000250 HC RX REV CODE- 250: Performed by: NURSE PRACTITIONER

## 2022-05-25 PROCEDURE — 74011250637 HC RX REV CODE- 250/637: Performed by: SPECIALIST

## 2022-05-25 PROCEDURE — 74011250637 HC RX REV CODE- 250/637: Performed by: INTERNAL MEDICINE

## 2022-05-25 PROCEDURE — 85025 COMPLETE CBC W/AUTO DIFF WBC: CPT

## 2022-05-25 PROCEDURE — 87635 SARS-COV-2 COVID-19 AMP PRB: CPT

## 2022-05-25 PROCEDURE — 94640 AIRWAY INHALATION TREATMENT: CPT

## 2022-05-25 PROCEDURE — 99233 SBSQ HOSP IP/OBS HIGH 50: CPT | Performed by: NURSE PRACTITIONER

## 2022-05-25 PROCEDURE — 36415 COLL VENOUS BLD VENIPUNCTURE: CPT

## 2022-05-25 RX ORDER — LEVOTHYROXINE SODIUM 150 UG/1
150 TABLET ORAL
Qty: 10 TABLET | Refills: 0 | Status: SHIPPED
Start: 2022-05-26

## 2022-05-25 RX ORDER — CYANOCOBALAMIN 1000 UG/ML
1000 INJECTION, SOLUTION INTRAMUSCULAR; SUBCUTANEOUS ONCE
Status: COMPLETED | OUTPATIENT
Start: 2022-05-25 | End: 2022-05-25

## 2022-05-25 RX ORDER — AMOXICILLIN 250 MG
1 CAPSULE ORAL 2 TIMES DAILY
Status: DISCONTINUED | OUTPATIENT
Start: 2022-05-25 | End: 2022-05-28 | Stop reason: HOSPADM

## 2022-05-25 RX ORDER — AMOXICILLIN 250 MG
1 CAPSULE ORAL 2 TIMES DAILY
Qty: 10 TABLET | Refills: 0 | Status: SHIPPED
Start: 2022-05-25

## 2022-05-25 RX ORDER — BUDESONIDE 0.25 MG/2ML
250 INHALANT ORAL 2 TIMES DAILY
Qty: 1 EACH | Refills: 0 | Status: SHIPPED
Start: 2022-05-25

## 2022-05-25 RX ORDER — ONDANSETRON 4 MG/1
4 TABLET, ORALLY DISINTEGRATING ORAL
Qty: 10 TABLET | Refills: 0 | Status: SHIPPED
Start: 2022-05-25 | End: 2022-05-25

## 2022-05-25 RX ORDER — CLONAZEPAM 0.5 MG/1
0.5 TABLET ORAL
Qty: 10 TABLET | Refills: 0 | Status: SHIPPED | OUTPATIENT
Start: 2022-05-25

## 2022-05-25 RX ORDER — ADHESIVE BANDAGE
30 BANDAGE TOPICAL DAILY PRN
Status: DISCONTINUED | OUTPATIENT
Start: 2022-05-25 | End: 2022-05-28 | Stop reason: HOSPADM

## 2022-05-25 RX ORDER — TRAZODONE HYDROCHLORIDE 50 MG/1
25 TABLET ORAL
Qty: 10 TABLET | Refills: 0 | Status: SHIPPED
Start: 2022-05-25

## 2022-05-25 RX ORDER — LIDOCAINE 4 G/100G
PATCH TOPICAL
Qty: 1 EACH | Refills: 0 | Status: SHIPPED
Start: 2022-05-25

## 2022-05-25 RX ORDER — IPRATROPIUM BROMIDE AND ALBUTEROL SULFATE 2.5; .5 MG/3ML; MG/3ML
3 SOLUTION RESPIRATORY (INHALATION)
Qty: 30 NEBULE | Refills: 0 | Status: SHIPPED
Start: 2022-05-25

## 2022-05-25 RX ORDER — GABAPENTIN 300 MG/1
300 CAPSULE ORAL 3 TIMES DAILY
Status: DISCONTINUED | OUTPATIENT
Start: 2022-05-25 | End: 2022-05-28 | Stop reason: HOSPADM

## 2022-05-25 RX ORDER — POLYETHYLENE GLYCOL 3350 17 G/17G
17 POWDER, FOR SOLUTION ORAL DAILY
Status: DISCONTINUED | OUTPATIENT
Start: 2022-05-25 | End: 2022-05-28 | Stop reason: HOSPADM

## 2022-05-25 RX ORDER — POLYETHYLENE GLYCOL 3350 17 G/17G
17 POWDER, FOR SOLUTION ORAL DAILY
Qty: 1 PACKET | Refills: 0 | Status: SHIPPED
Start: 2022-05-26

## 2022-05-25 RX ORDER — HYDROCODONE BITARTRATE AND ACETAMINOPHEN 5; 325 MG/1; MG/1
1 TABLET ORAL
Status: DISCONTINUED | OUTPATIENT
Start: 2022-05-25 | End: 2022-05-28 | Stop reason: HOSPADM

## 2022-05-25 RX ORDER — GABAPENTIN 300 MG/1
300 CAPSULE ORAL 3 TIMES DAILY
Qty: 15 CAPSULE | Refills: 0 | Status: SHIPPED | OUTPATIENT
Start: 2022-05-25

## 2022-05-25 RX ORDER — BUTALBITAL, ASPIRIN AND CAFFEINE 50; 325; 40 MG/1; MG/1; MG/1
1 TABLET ORAL
Qty: 10 TABLET | Refills: 0 | Status: SHIPPED | OUTPATIENT
Start: 2022-05-25

## 2022-05-25 RX ORDER — HYDROCODONE BITARTRATE AND ACETAMINOPHEN 5; 325 MG/1; MG/1
1 TABLET ORAL
Qty: 10 TABLET | Refills: 0 | Status: SHIPPED | OUTPATIENT
Start: 2022-05-25 | End: 2022-05-28

## 2022-05-25 RX ORDER — PANTOPRAZOLE SODIUM 40 MG/1
40 TABLET, DELAYED RELEASE ORAL
Qty: 30 TABLET | Refills: 0 | Status: SHIPPED
Start: 2022-05-25

## 2022-05-25 RX ORDER — LANOLIN ALCOHOL/MO/W.PET/CERES
325 CREAM (GRAM) TOPICAL
Qty: 30 TABLET | Refills: 0 | Status: SHIPPED | OUTPATIENT
Start: 2022-05-26

## 2022-05-25 RX ADMIN — ONDANSETRON HYDROCHLORIDE 4 MG: 2 INJECTION, SOLUTION INTRAMUSCULAR; INTRAVENOUS at 08:26

## 2022-05-25 RX ADMIN — CLONAZEPAM 0.5 MG: 0.5 TABLET ORAL at 11:38

## 2022-05-25 RX ADMIN — ONDANSETRON HYDROCHLORIDE 4 MG: 2 INJECTION, SOLUTION INTRAMUSCULAR; INTRAVENOUS at 20:36

## 2022-05-25 RX ADMIN — ONDANSETRON HYDROCHLORIDE 4 MG: 2 INJECTION, SOLUTION INTRAMUSCULAR; INTRAVENOUS at 02:02

## 2022-05-25 RX ADMIN — POTASSIUM BICARBONATE 20 MEQ: 782 TABLET, EFFERVESCENT ORAL at 16:05

## 2022-05-25 RX ADMIN — LEVOTHYROXINE SODIUM 150 MCG: 0.15 TABLET ORAL at 06:19

## 2022-05-25 RX ADMIN — SODIUM CHLORIDE, PRESERVATIVE FREE 10 ML: 5 INJECTION INTRAVENOUS at 05:13

## 2022-05-25 RX ADMIN — HYDROXYCHLOROQUINE SULFATE 200 MG: 200 TABLET ORAL at 08:21

## 2022-05-25 RX ADMIN — GABAPENTIN 300 MG: 300 CAPSULE ORAL at 16:09

## 2022-05-25 RX ADMIN — HYDROCODONE BITARTRATE AND ACETAMINOPHEN 1 TABLET: 5; 325 TABLET ORAL at 17:28

## 2022-05-25 RX ADMIN — HYDROCODONE BITARTRATE AND ACETAMINOPHEN 1 TABLET: 5; 325 TABLET ORAL at 08:22

## 2022-05-25 RX ADMIN — POLYETHYLENE GLYCOL 3350 17 G: 17 POWDER, FOR SOLUTION ORAL at 13:01

## 2022-05-25 RX ADMIN — BUTALBITAL, ACETAMINOPHEN, AND CAFFEINE 1 TABLET: 50; 325; 40 TABLET ORAL at 03:19

## 2022-05-25 RX ADMIN — SODIUM CHLORIDE, PRESERVATIVE FREE 10 ML: 5 INJECTION INTRAVENOUS at 21:45

## 2022-05-25 RX ADMIN — GABAPENTIN 300 MG: 300 CAPSULE ORAL at 21:43

## 2022-05-25 RX ADMIN — SUCRALFATE 1 G: 1 TABLET ORAL at 11:38

## 2022-05-25 RX ADMIN — FLUOXETINE HYDROCHLORIDE 60 MG: 20 CAPSULE ORAL at 08:21

## 2022-05-25 RX ADMIN — BUDESONIDE 250 MCG: 0.25 INHALANT RESPIRATORY (INHALATION) at 19:30

## 2022-05-25 RX ADMIN — SENNOSIDES AND DOCUSATE SODIUM 1 TABLET: 50; 8.6 TABLET ORAL at 17:28

## 2022-05-25 RX ADMIN — CYANOCOBALAMIN 1000 MCG: 1000 INJECTION, SOLUTION INTRAMUSCULAR; SUBCUTANEOUS at 16:05

## 2022-05-25 RX ADMIN — SODIUM CHLORIDE, PRESERVATIVE FREE 10 ML: 5 INJECTION INTRAVENOUS at 13:04

## 2022-05-25 RX ADMIN — LIOTHYRONINE SODIUM 5 MCG: 5 TABLET ORAL at 08:21

## 2022-05-25 RX ADMIN — FERROUS SULFATE TAB 325 MG (65 MG ELEMENTAL FE) 325 MG: 325 (65 FE) TAB at 08:22

## 2022-05-25 RX ADMIN — CLONAZEPAM 0.5 MG: 0.5 TABLET ORAL at 18:48

## 2022-05-25 RX ADMIN — MORPHINE SULFATE 2 MG: 2 INJECTION, SOLUTION INTRAMUSCULAR; INTRAVENOUS at 02:03

## 2022-05-25 RX ADMIN — SUCRALFATE 1 G: 1 TABLET ORAL at 08:22

## 2022-05-25 RX ADMIN — SUCRALFATE 1 G: 1 TABLET ORAL at 16:05

## 2022-05-25 RX ADMIN — ONDANSETRON HYDROCHLORIDE 4 MG: 2 INJECTION, SOLUTION INTRAMUSCULAR; INTRAVENOUS at 13:01

## 2022-05-25 RX ADMIN — CLONAZEPAM 0.5 MG: 0.5 TABLET ORAL at 03:11

## 2022-05-25 RX ADMIN — HYDROCODONE BITARTRATE AND ACETAMINOPHEN 1 TABLET: 5; 325 TABLET ORAL at 21:43

## 2022-05-25 RX ADMIN — SUCRALFATE 1 G: 1 TABLET ORAL at 21:43

## 2022-05-25 RX ADMIN — HYDROCODONE BITARTRATE AND ACETAMINOPHEN 1 TABLET: 5; 325 TABLET ORAL at 13:01

## 2022-05-25 RX ADMIN — BUTALBITAL, ACETAMINOPHEN, AND CAFFEINE 1 TABLET: 50; 325; 40 TABLET ORAL at 16:05

## 2022-05-25 RX ADMIN — SENNOSIDES AND DOCUSATE SODIUM 1 TABLET: 50; 8.6 TABLET ORAL at 13:01

## 2022-05-25 RX ADMIN — BUTALBITAL, ACETAMINOPHEN, AND CAFFEINE 1 TABLET: 50; 325; 40 TABLET ORAL at 09:36

## 2022-05-25 RX ADMIN — BUTALBITAL, ACETAMINOPHEN, AND CAFFEINE 1 TABLET: 50; 325; 40 TABLET ORAL at 21:43

## 2022-05-25 RX ADMIN — ONDANSETRON 4 MG: 2 INJECTION INTRAMUSCULAR; INTRAVENOUS at 18:47

## 2022-05-25 RX ADMIN — PANTOPRAZOLE SODIUM 40 MG: 40 TABLET, DELAYED RELEASE ORAL at 16:05

## 2022-05-25 RX ADMIN — PANTOPRAZOLE SODIUM 40 MG: 40 TABLET, DELAYED RELEASE ORAL at 08:21

## 2022-05-25 RX ADMIN — FUROSEMIDE 40 MG: 40 TABLET ORAL at 08:21

## 2022-05-25 RX ADMIN — GABAPENTIN 300 MG: 300 CAPSULE ORAL at 08:21

## 2022-05-25 NOTE — DISCHARGE INSTRUCTIONS
Diet-.  As per patient request  Activity slowly increase as tolerated to levels before  Check labs CBC/BMP Monday next week  Return to ER or call MD immediately if symptoms recur or get worse  Strict fall/aspiration/pressure ulcer precautions  Avoid NSAIDs

## 2022-05-25 NOTE — PROGRESS NOTES
Received notification from bedside RN about patient with regards to: persistent abdominal pain not relieved by oral pain medication, requesting medicaiton for relief  VS: /63, HR 76, RR 16, O2 sat 99% on RA    Intervention given: Morphine 2 mg IV x 1 dose ordered

## 2022-05-25 NOTE — PROGRESS NOTES
Palliative Medicine Consult  G[place]: [phone])    Patient Name: Héctor Manley  YOB: 1960    Date of Initial Consult: 5/24/22  Reason for Consult: pain management  Requesting Provider: Batsheva Burger MD   Primary Care Physician: Yesenia, MD Chelsea     SUMMARY:   Héctor Manley is a 62 y.o. with a past history of hypothyroid disease, COPD,  iron deficiency anemia followed by , sarcoidosis, lupus, history of bleeding peptic ulcer disease requiring transfusions in the past, gastric sleeve in '13 and required a revision in '20 with mesh placement, schizoaffective d/o, who was admitted on 5/16/2022 from Hematology office with a diagnosis of GIB.     HPI: 5/16: pt was sent to ED from Hematology office with c/o SOB, generalized weakness, fatigue, epigastric abdominal pain, nausea, vomiting, hematemesis, poor oral intake, that started over the past week. She has had multiple episodes of hematemesis that started as BRB and now is dark brown-maroon color. Pt was seen at [facility] on 5/10 where she was noted to have hgb 10.3 and was sent home. Sshe is also c/o feeling cold. Admission course: 5/16: in ED, BP 63/40 that improved to 92/60, HR 50 following 2L IVF, hgb 8.3. Admitted to ICU.  5/17: hgb 8.3->6.5->then following 1 unit prbcs 7.4->7.8. Pt angry that pain medication was held for EGD, called endo dept stating they were taking too long and threatened to leave, demanding Dilaudid. (Per psyc note pt denied drug or ETOH abuse however, per 2 year  search she has been filling Suboxone prescriptions since 1/14/21 with last fill 4/20/22.)    EGD 5/17: lower 1/3 of esophagus with scarred appearing mucosa with some friability seen extending from 30-33 cm from incisors. Slight oozing but no mucosal tear seen. This appeared c/w partially healed LA Grade B erosive esophagitis. No bx performed. There was no other nodularity seen or mucosal lesions in esophagus.   There was a moderate sized hiatal hernia with GEJ located at 40 cm form incisors. 5/25: pt continues to c/o bilateral knee pain. Current medical issues leading to Palliative Medicine involvement include: drug seeking behavior. Psychosocial: lives in [place] with  Pankaj, unclear if still  but living in same house. Uses 02 at home prn, also smokes. French Glaser reports she brought pt to [place] for a \"vacation\", during which pt was significantly debilitated, not safe to be alone, sometimes barely able to ambulate, wouldn't allow her to cook due to visual impairment. Sahra bought pt a train ticket to go back home to [place] but pt refused to go, instead came to our ER 5/10/22. She has been admitted to several LTC facilities in [place] but would leave because she didn't like them. PALLIATIVE DIAGNOSES:   1. Abdominal pain  2. Hypovolemic shock/hypotension  3. Acute anemia due to UGIB and chronic anemia due to iron deficiency  4. Bradycardia   5. Hematemesis   6. Legally blind  7. Physical debility   8. bilat shoulder replacements  9. Suicide attempt (per attending note 5/19 indicates pt admitted for clonazempame/suboxone overdose in [place])  10. ? OUD/PAIN MMT: Suboxone 12/3mg film daily PTA  11. Chronic pain syndrome: bilateral knees osteoarthritis, had bilat shoulder replacements     PLAN:   1. Prior to visit, I completed a review of patient's medical records, including medical documentation, vital signs, MARs, and results of various labs and other diagnostics. I also spoke with patient's nurse Lee Rabago, attending Dr. Gloria Dykes, addiction medicine physician Dr. Brit Montero,  Екатерина Stock. 2. Per my discussion with Jamil Silva pt does not meet criteria for opioid use disorder at this time, therefore no reason to start her back on suboxone. PCP in [place] was using suboxone in conjunction with toradol for pain management. 3. PAIN MMT:  Unfortunately pt cannot use any NSAIDs due to erosive esophagitis.   Buprenorphine can be used for pain management however, it is a long-acting opiate that should only be prescribed when there is a long term plan; pt has not committed to a discharge plan following discharge from rehab but most likely given she has no where to live in [place] she will return to [address] [place] [address] where she can follow up with her PCP where she can resume treatment with Suboxone at their discretion. For now:  1. Norco 5/325mg q. 4 hours prn pain. 2. Continue Fioricet -40mg q. 6 hours prn headaches. 3. Patient takes Norco and Fioricet around the clock, therefore the acetaminophen daily total is 3250mg. 4. DISPO: plan is for transfer to rehab. Pt has not decided where she will go after rehab is completed. Pt reports that she has a  in [address] [place] [address] how is going to help her find a guardian as she does not have anyone she is comfortable with making medical or financial decisions on her behalf. 5. Will sign off, please re-consult if Palliative Medicine can be of further service. Thank you for including Palliative Medicine in this patient's care. 6. DRUG SEEKING BEHAVIOR:  Patient has multiple chronic medical conditions that can cause pain, this behavior can be caused by under treated pain. 7. Initial consult note routed to primary continuity provider and/or primary health care team members  8.  Communicated plan of care with: Danilo Rodriguez 192 Team     GOALS OF CARE / TREATMENT PREFERENCES:     GOALS OF CARE:  Rehab then home       TREATMENT PREFERENCES:   Code Status: Full Code       HISTORY:     History obtained from: patient     The patient is:   [x] Verbal and participatory  Not a good historian  [] Non-participatory due to:        Clinical Pain Assessment (nonverbal scale for severity on nonverbal patients):   Clinical Pain Assessment  Severity: 5  Location: both knees  Character: \"they just hurt\"  Duration: years  Effect: can't walk well  Factors: any movement  Frequency: constant     Activity (Movement): Restless, excessive activity and/or withdrawal reflexes    Duration: for how long has pt been experiencing pain (e.g., 2 days, 1 month, years)  Frequency: how often pain is an issue (e.g., several times per day, once every few days, constant)     FUNCTIONAL ASSESSMENT:     Palliative Performance Scale (PPS):  PPS: 30       PSYCHOSOCIAL/SPIRITUAL SCREENING:     Palliative IDT has assessed this patient for cultural preferences / practices and a referral made as appropriate to needs (Cultural Services, Patient Advocacy, Ethics, etc.)    Any spiritual / Tenriism concerns:  [] Yes /  [x] No   If \"Yes\" to discuss with pastoral care during IDT     Does caregiver feel burdened by caring for their loved one:   [] Yes /  [x] No /  [] No Caregiver Present    If \"Yes\" to discuss with social work during IDT    Anticipatory grief assessment:   [x] Normal  / [] Maladaptive     If \"Maladaptive\" to discuss with social work during IDT    ESAS Anxiety: Anxiety: 0    ESAS Depression: Depression: 1        REVIEW OF SYSTEMS:     Positive and pertinent negative findings in ROS are noted above in HPI. The following systems were [x] reviewed / [] unable to be reviewed as noted in HPI  Other findings are noted below. Systems: constitutional, ears/nose/mouth/throat, respiratory, gastrointestinal, genitourinary, musculoskeletal, integumentary, neurologic, psychiatric, endocrine. Positive findings noted below. Modified ESAS Completed by: provider   Fatigue: 3 Drowsiness: 0   Depression: 1 Pain: 5   Anxiety: 0 Nausea: 0   Anorexia: 0 Dyspnea: 0     Constipation: No     Stool Occurrence(s): 0        PHYSICAL EXAM:     From RN flowsheet:  Wt Readings from Last 3 Encounters:   05/17/22 124 lb 5.4 oz (56.4 kg)   05/16/22 122 lb 12.8 oz (55.7 kg)   05/10/22 132 lb (59.9 kg)     Blood pressure (!) 106/58, pulse 68, temperature 98.7 °F (37.1 °C), resp. rate 17, height 5' 3\" (1.6 m), weight 124 lb 5.4 oz (56.4 kg), SpO2 98 %, not currently breastfeeding.     Pain Scale 1: Numeric (0 - 10)  Pain Intensity 1: 7  Pain Onset 1: chronic  Pain Location 1: Generalized  Pain Orientation 1: Anterior  Pain Description 1: Aching  Pain Intervention(s) 1: Nurse notified  Last bowel movement, if known:     Constitutional: frail, chronically ill appearing, appears older than stated age  Eyes: difficulty keeping eyes open  Musculoskeletal: no deformity, bilat knees tender  to palpation  Skin: warm, dry  Neurologic: following commands, moving all extremities  Psychiatric: full affect, no hallucinations          HISTORY:     Active Problems:    Iron deficiency anemia (5/16/2022)      GI bleed (5/16/2022)      Hematemesis (5/17/2022)      Substance use disorder (5/24/2022)      Chronic pain of both knees (5/24/2022)      Palliative care by specialist (5/24/2022)      Past Medical History:   Diagnosis Date    Arthritis     Iron deficiency     Lupus ([facility] [address]     Osteoporosis     Sarcoidosis       Past Surgical History:   Procedure Laterality Date    HX GASTRIC BYPASS      gastric sleeve    HX HERNIA REPAIR  2013    needs a repat she says      History reviewed. No pertinent family history. History reviewed, no pertinent family history.   Social History     Tobacco Use    Smoking status: Former Smoker     Types: Cigarettes    Smokeless tobacco: Never Used   Substance Use Topics    Alcohol use: Never     Allergies   Allergen Reactions    Haldol [Haloperidol Lactate] Anaphylaxis    Risperidone Anaphylaxis    Clarithromycin Other (comments)     Urticaria      Current Facility-Administered Medications   Medication Dose Route Frequency    gabapentin (NEURONTIN) capsule 300 mg  300 mg Oral TID    HYDROcodone-acetaminophen (NORCO) 5-325 mg per tablet 1 Tablet  1 Tablet Oral Q4H PRN    senna-docusate (PERICOLACE) 8.6-50 mg per tablet 1 Tablet  1 Tablet Oral BID    polyethylene glycol (MIRALAX) packet 17 g  17 g Oral DAILY    magnesium hydroxide (MILK OF MAGNESIA) 400 mg/5 mL oral suspension 30 mL  30 mL Oral DAILY PRN    ferrous sulfate tablet 325 mg  1 Tablet Oral DAILY WITH BREAKFAST    clonazePAM (KlonoPIN) tablet 0.5 mg  0.5 mg Oral Q8H PRN    pantoprazole (PROTONIX) tablet 40 mg  40 mg Oral ACB&D    butalbital-acetaminophen-caffeine (FIORICET, ESGIC) -40 mg per tablet 1 Tablet  1 Tablet Oral Q6H PRN    levothyroxine (SYNTHROID) tablet 150 mcg  150 mcg Oral 6am    albuterol-ipratropium (DUO-NEB) 2.5 MG-0.5 MG/3 ML  3 mL Nebulization Q6H PRN    liothyronine (CYTOMEL) tablet 5 mcg  5 mcg Oral DAILY    traZODone (DESYREL) tablet 25 mg  25 mg Oral QHS PRN    furosemide (LASIX) tablet 40 mg  40 mg Oral DAILY    hydrOXYchloroQUINE (PLAQUENIL) tablet 200 mg  200 mg Oral DAILY    nicotine (NICODERM CQ) 21 mg/24 hr patch 1 Patch  1 Patch TransDERmal DAILY    ondansetron (ZOFRAN) injection 4 mg  4 mg IntraVENous Q6H    sucralfate (CARAFATE) tablet 1 g  1 g Oral AC&HS    FLUoxetine (PROzac) capsule 60 mg  60 mg Oral DAILY    lidocaine 4 % patch 1 Patch  1 Patch TransDERmal Q24H    budesonide (PULMICORT) 250 mcg/2ml nebulizer susp  250 mcg Nebulization BID RT    sodium chloride (NS) flush 5-40 mL  5-40 mL IntraVENous Q8H    sodium chloride (NS) flush 5-40 mL  5-40 mL IntraVENous PRN    ondansetron (ZOFRAN ODT) tablet 4 mg  4 mg Oral Q8H PRN    Or    ondansetron (ZOFRAN) injection 4 mg  4 mg IntraVENous Q6H PRN    0.9% sodium chloride infusion 250 mL  250 mL IntraVENous PRN          LAB AND IMAGING FINDINGS:     Lab Results   Component Value Date/Time    WBC 5.7 05/25/2022 02:12 AM    HGB 9.2 (L) 05/25/2022 02:12 AM    PLATELET 603 (H) 37/62/5663 02:12 AM     Lab Results   Component Value Date/Time    Sodium 138 05/25/2022 02:12 AM    Potassium 3.6 05/25/2022 02:12 AM    Chloride 103 05/25/2022 02:12 AM    CO2 30 05/25/2022 02:12 AM    BUN 11 05/25/2022 02:12 AM    Creatinine 0.70 05/25/2022 02:12 AM    Calcium 8.8 05/25/2022 02:12 AM    Magnesium 2.3 05/25/2022 02:12 AM    Phosphorus 3.6 05/25/2022 02:12 AM      Lab Results   Component Value Date/Time    Alk. phosphatase 63 05/22/2022 04:02 AM    Protein, total 6.5 05/22/2022 04:02 AM    Albumin 2.8 (L) 05/25/2022 02:12 AM    Globulin 3.7 05/22/2022 04:02 AM     Lab Results   Component Value Date/Time    INR 1.0 05/18/2022 05:30 AM    Prothrombin time 10.8 05/18/2022 05:30 AM      Lab Results   Component Value Date/Time    Iron 18 (L) 05/22/2022 03:59 AM    TIBC 340 05/22/2022 03:59 AM    Iron % saturation 5 (L) 05/22/2022 03:59 AM    Ferritin 17 05/19/2022 03:24 AM      No results found for: PH, PCO2, PO2  No components found for: GLPOC   No results found for: CPK, CKMB             Total time: 45  Counseling / coordination time, spent as noted above: 35  > 50% counseling / coordination?: y    Prolonged service was provided for  []30 min   []75 min in face to face time in the presence of the patient, spent as noted above. Time Start:   Time End:   Note: this can only be billed with 37715 (initial) or 10371 (follow up). If multiple start / stop times, list each separately.

## 2022-05-25 NOTE — PROGRESS NOTES
Hospitalist Progress Note    NAME: Aileen Morrison   :  1960   MRN:  [id number]     Admit date: 2022    Today's date: 22      Assessment / Plan:  Hypovolemic shock  resolved  Acute blood loss anemia   resolved  GI bleeding from Erosive esophagitis,   Iron deficiency  - Reportedly vomited blood PTA, hypotensive in ED  - S/p 2 L IVF in ED  - S/p 1 unit PRBCs   - EGD    Esophagus:             Lower 1/3 of esophagus with scarred appearing mucosa with some friability seen extending from 30-33 cm from incisors.              Slight oozing but no mucosal tear seen.  This appeared c/w partially healed LA Grade B erosive esophagitis.              No bx performed. Jackie Delio was no other nodularity seen or mucosal lesions in esophagus.          There was a moderate sized hiatal hernia with GEJ located at 40 cm form incisors.    Stomach:               Normal mucosa throughout the stomach that appeared cylindrical shaped in keeping with prior Gastric sleeve.                No ulcers in stomach.  Pylorus patent. Duodenum:             Normal mucosa to second portion.     - Avoid NSAIDs  - Continue carafate with PPI  -She wants to be on puréed diet   - Iron sucrose 200mg IV x 1 ()  - On  p.o. iron supplement given iron studies compatible with iron deficiency anemia   -Follow-up with GI as OP     Deconditioned  DJD, knees  - was on NSAIDs for DJD. Try hydrocodone prn. Baseline walks sometimes by herself but frequently needs cane. Does not drive.      - PT OT eval and treat. DC planning to SNF     Abdominal pain  - Patient reported that she needs premedication for that reason  - LFTs obtained within normal limits  -  CT abdomen showed cholelithiasis without Choleycystitis  - Physical exam is benign  - Continue previous Protonix 40 mg twice daily     Bradycardia-resolved  Hypothyroidism  -TSH 0.10  -Home synthroid reduced from 175 mcg to 150 mcg  -also on cytomel PTA.   Needs repeat TSH in 4 weeks with PCP    COPD  -duoneb Q 6 hr  -budesonide neb Q 12     Schizoaffective disorder POA  Recent admit for clonopine/suboxone overdose in [place] several months ago  Chronic pain syndrome-palliative consulted appreciate note 5/25  No suboxone at d/c avoid narcotics  Ok for norco per palliative team  On neurontin   Continue psych meds  Was seen by psych but no new recommendations     Hx of lupus- continue Plaquenil follow-up with primary rheumatologist        Code status: Full  Prophylaxis: SCD's  Recommended Disposition:SNF placement pending,  medically stable at this time      Subjective:     Patient was seen and examined. Chart was reviewed. Patient continues to complain of abdominal pain was given IV morphine last night. Palliative saw patient recommend avoiding narcotics and try Suboxone before discharge. Ultrasound abdomen showed cholelithiasis without CBD dilatation. Objective:     VITALS:   Last 24hrs VS reviewed since prior progress note. Most recent are:  Patient Vitals for the past 24 hrs:   Temp Pulse Resp BP SpO2   05/25/22 0826 98.9 °F (37.2 °C) 67 18 100/69 97 %   05/25/22 0431 97.9 °F (36.6 °C) 66 17 (!) 124/57 96 %   05/24/22 2041 97.4 °F (36.3 °C) 77 18 (!) 115/52 99 %   05/24/22 2007 -- -- -- -- 99 %   05/24/22 1504 98.3 °F (36.8 °C) 76 16 102/63 97 %       Intake/Output Summary (Last 24 hours) at 5/25/2022 1118  Last data filed at 5/25/2022 0520  Gross per 24 hour   Intake 240 ml   Output 300 ml   Net -60 ml        Wt Readings from Last 12 Encounters:   05/17/22 56.4 kg (124 lb 5.4 oz)   05/16/22 55.7 kg (122 lb 12.8 oz)   05/10/22 59.9 kg (132 lb)       PHYSICAL EXAM:    I had a face to face encounter and independently examined this patient on 05/25/22 as outlined below:    General: WD WN.     EENT:  MMM  Resp:  No accessory muscle use  CV:  Regular  rhythm,  GI:  Soft, Non distended, Non tender  Neurologic:  No focal deficit  Psych:   Not anxious nor agitated  Skin:  No julia.      LABS:    Pertinent labs include:  Recent Labs     05/25/22 0212 05/24/22  0255   WBC 5.7 6.8   HGB 9.2* 9.1*   HCT 29.9* 29.5*   * 596*     Recent Labs     05/25/22 0212 05/24/22 0255 05/23/22  0238     --   --    K 3.6  --   --      --   --    CO2 30  --   --    GLU 99  --   --    BUN 11  --   --    CREA 0.70  --   --    CA 8.8  --   --    MG 2.3 2.3 2.3   PHOS 3.6  --   --    ALB 2.8*  --   --          Current Facility-Administered Medications:     gabapentin (NEURONTIN) capsule 300 mg, 300 mg, Oral, TID, Bandar Reyna MD    HYDROcodone-acetaminophen (NORCO) 5-325 mg per tablet 1 Tablet, 1 Tablet, Oral, Q4H PRN, Roxanne Alicia, NP, 1 Tablet at 05/25/22 1301    senna-docusate (PERICOLACE) 8.6-50 mg per tablet 1 Tablet, 1 Tablet, Oral, BID, Bandar Reyna MD, 1 Tablet at 05/25/22 1301    polyethylene glycol (MIRALAX) packet 17 g, 17 g, Oral, DAILY, Bandar Reyna MD, 17 g at 05/25/22 1301    magnesium hydroxide (MILK OF MAGNESIA) 400 mg/5 mL oral suspension 30 mL, 30 mL, Oral, DAILY PRN, Bandar Reyna MD    ferrous sulfate tablet 325 mg, 1 Tablet, Oral, DAILY WITH BREAKFAST, Lanny Brody MD, 325 mg at 05/25/22 9882    clonazePAM (KlonoPIN) tablet 0.5 mg, 0.5 mg, Oral, Q8H PRN, Emmie Brown MD, 0.5 mg at 05/25/22 1138    pantoprazole (PROTONIX) tablet 40 mg, 40 mg, Oral, ACB&D, Emmie Brown MD, 40 mg at 05/25/22 1691    butalbital-acetaminophen-caffeine (FIORICET, ESGIC) -40 mg per tablet 1 Tablet, 1 Tablet, Oral, Q6H PRN, Emmie Brown MD, 1 Tablet at 05/25/22 0936    levothyroxine (SYNTHROID) tablet 150 mcg, 150 mcg, Oral, 6am, Emmie Brown MD, 150 mcg at 05/25/22 3273    albuterol-ipratropium (DUO-NEB) 2.5 MG-0.5 MG/3 ML, 3 mL, Nebulization, Q6H PRN, Emmie Brown MD    liothyronine (CYTOMEL) tablet 5 mcg, 5 mcg, Oral, DAILY, Emmie Brown MD, 5 mcg at 05/25/22 1531    traZODone (DESYREL) tablet 25 mg, 25 mg, Oral, QHS PRN, Emmie Brown MD, 25 mg at 05/24/22 0212    furosemide (LASIX) tablet 40 mg, 40 mg, Oral, DAILY, Levon Gitelman, MD, 40 mg at 05/25/22 2439    hydrOXYchloroQUINE (PLAQUENIL) tablet 200 mg, 200 mg, Oral, DAILY, Levon Gitelman, MD, 200 mg at 05/25/22 6444    nicotine (NICODERM CQ) 21 mg/24 hr patch 1 Patch, 1 Patch, TransDERmal, DAILY, Yumiko Santillan, Tiesha Henriquez NP, 1 Patch at 05/25/22 0820    ondansetron (ZOFRAN) injection 4 mg, 4 mg, IntraVENous, Q6H, Yennifer Bills PA, 4 mg at 05/25/22 1301    sucralfate (CARAFATE) tablet 1 g, 1 g, Oral, AC&HS, David Thomas MD, 1 g at 05/25/22 1138    FLUoxetine (PROzac) capsule 60 mg, 60 mg, Oral, DAILY, Roise Kayser, MD, 60 mg at 05/25/22 6465    lidocaine 4 % patch 1 Patch, 1 Patch, TransDERmal, Q24H, Maren Riggs MD, 1 Patch at 05/24/22 2015    budesonide (PULMICORT) 250 mcg/2ml nebulizer susp, 250 mcg, Nebulization, BID RT, Yumiko Santillan, Tennille DAVIES NP, 250 mcg at 05/24/22 2007    sodium chloride (NS) flush 5-40 mL, 5-40 mL, IntraVENous, Q8H, Tiesha Apodaca NP, 10 mL at 05/25/22 1304    sodium chloride (NS) flush 5-40 mL, 5-40 mL, IntraVENous, PRN, Tiesha Powers NP    ondansetron (ZOFRAN ODT) tablet 4 mg, 4 mg, Oral, Q8H PRN **OR** ondansetron (ZOFRAN) injection 4 mg, 4 mg, IntraVENous, Q6H PRN, Tien Melendez NP, 4 mg at 05/23/22 1636    0.9% sodium chloride infusion 250 mL, 250 mL, IntraVENous, PRN, Tien DAVIES NP    Signed: Calli Mccloud MD

## 2022-05-25 NOTE — PROGRESS NOTES
End of Shift Note    Bedside shift change report given to Carmen De Luna RN (oncoming nurse) by Nain Paris LPN (offgoing nurse). Report included the following information SBAR, Kardex and MAR    Shift worked:  7a-7p     Shift summary and any significant changes:    Patient anxious and requesting pain medication throughout shift-see MAR. Concerns for physician to address: none   Zone phone for oncoming shift:  [phone]     Activity:  Activity Level: Up with Assistance  Number times ambulated in hallways past shift: 0  Number of times OOB to chair past shift: 0    Cardiac:   Cardiac Monitoring: No      Cardiac Rhythm: Sinus Tachy    Access:   Current line(s): PIV     Genitourinary:   Urinary status: voiding    Respiratory:   O2 Device: None (Room air)  Chronic home O2 use?: NO  Incentive spirometer at bedside: NO       GI:  Last Bowel Movement Date: 05/23/22  Current diet:  ADULT ORAL NUTRITION SUPPLEMENT Lunch; Protein Modular  DIET ONE TIME MESSAGE  ADULT DIET Dysphagia - Pureed  Passing flatus: YES  Tolerating current diet: YES       Pain Management:   Patient states pain is manageable on current regimen: NO    Skin:  Quan Score: 20  Interventions: increase time out of bed, PT/OT consult and nutritional support     Patient Safety:  Fall Score:  Total Score: 3  Interventions: gripper socks, pt to call before getting OOB and stay with me (per policy)  High Fall Risk: Yes    Length of Stay:  Expected LOS: 4d 14h  Actual LOS: 9      Nain Paris LPN Patient : Trevor Whitaker Age: 69 year old Sex: male   MRN: [id number] Encounter Date: 12/13/2021    E06/06    History     Chief Complaint   Patient presents with   • Fever 9 Weeks to 74 years     HPI  12/13/2021  4:13 PM Trevor Whitaker is a 69 year old male who presents to the ED by EMS for evaluation of of a low-grade fever.  Per EMS patient is A&O x1 which the report is at his baseline.  They states that he has been having a low-grade fever at his facility. They are concerned he may have an ear infection, they also note a decrease in appetite. His group home wanted him to go to Urgent Care , but was not able to go due to not having a valid ID .  Patient does not have any complaints at this time. There are no further complaints or modifying factors at this time.    PCP: Verify Pcp    No Known Allergies    Discharge Medication List as of 12/13/2021  8:22 PM      Prior to Admission Medications    Details   ondansetron (ZOFRAN ODT) 4 MG disintegrating tablet Place 1 tablet onto the tongue every 6 hours as needed for Nausea.Normal, Disp-10 tablet, R-0      Multiple Vitamins-Minerals (CEROVITE SENIOR PO) Historical Med      chlorhexidine gluconate (PERIDEX) 0.12 % solution Swish and spit 15 mLs 2 times daily.Historical Med      acetaminophen (TYLENOL) 325 MG tablet Take 650 mg by mouth every 4 hours as needed for Pain.Historical Med      Sunscreens (BLISTEX COMPLETE MOISTURE EX) Historical Med      loperamide (IMODIUM) 2 MG capsule Take 2 mg by mouth 4 times daily as needed for Diarrhea.Historical Med      polyethylene glycol (GLYCOLAX, MIRALAX) packet Take 17 g by mouth daily.Historical Med      prochlorperazine (COMPAZINE) 10 MG tablet Take 10 mg by mouth every 6 hours as needed.Historical Med         New Prescriptions    Details   amoxicillin (AMOXIL) 875 MG tablet Take 1 tablet by mouth 2 times daily for 7 days.Eprescribe, Disp-14 tablet, R-0             No past medical history on file.    No past surgical history on  file.    No family history on file.    Social History     Tobacco Use   • Smoking status: Never Smoker   • Smokeless tobacco: Never Used   Substance Use Topics   • Alcohol use: No   • Drug use: Not on file       E-cigarette/Vaping     E-Cigarette/Vaping Substances & Devices       Review of Systems   Unable to perform ROS: Other   Constitutional: Positive for appetite change and fever.   HENT: Positive for ear pain.        Physical Exam     ED Triage Vitals [12/13/21 1621]   ED Triage Vitals Group      Temp 97.7 °F (36.5 °C)      Heart Rate 72      Resp 18      BP (!) 188/79      SpO2 96 %      EtCO2 mmHg       Height       Weight 166 lb 0.1 oz (75.3 kg)      Weight Scale Used Scale in bed      BMI (Calculated)       IBW/kg (Calculated)        Physical Exam  Vitals and nursing note reviewed.   Constitutional:       General: He is not in acute distress.     Appearance: He is well-developed.   HENT:      Head: Normocephalic and atraumatic.      Right Ear: There is impacted cerumen.      Left Ear: There is impacted cerumen.   Cardiovascular:      Rate and Rhythm: Normal rate and regular rhythm.   Pulmonary:      Effort: Pulmonary effort is normal.      Breath sounds: Normal breath sounds.   Musculoskeletal:         General: Normal range of motion.      Cervical back: Normal range of motion and neck supple.   Skin:     General: Skin is warm and dry.   Neurological:      Mental Status: He is alert and oriented to person, place, and time.         ED Course     Ear Cerumen Removal    Date/Time: 12/13/2021 4:41 PM  Performed by: Tara Prince PA-C  Authorized by: Jamie Arciniega DO     Consent:     Consent obtained:  Verbal    Consent given by:  Guardian    Risks discussed:  Bleeding, infection, incomplete removal and pain    Alternatives discussed:  No treatment  Procedure details:     Location:  L ear and R ear    Procedure type: curette    Post-procedure details:     Post-procedure ear inspection: there continues to be  a large amount of hard cerumen.     Patient tolerance of procedure:  Tolerated well, no immediate complications        Lab Results     Results for orders placed or performed during the hospital encounter of 12/13/21   Urinalysis & Reflex Microscopy With Culture If Indicated   Result Value Ref Range    COLOR, URINALYSIS Yellow     APPEARANCE, URINALYSIS Hazy     GLUCOSE, URINALYSIS Negative Negative mg/dL    BILIRUBIN, URINALYSIS Negative Negative    KETONES, URINALYSIS Negative Negative mg/dL    SPECIFIC GRAVITY, URINALYSIS 1.016 1.005 - 1.030    OCCULT BLOOD, URINALYSIS Negative Negative    PH, URINALYSIS 5.0 5.0 - 7.0    PROTEIN, URINALYSIS Negative Negative mg/dL    UROBILINOGEN, URINALYSIS 0.2 0.2, 1.0 mg/dL    NITRITE, URINALYSIS Negative Negative    LEUKOCYTE ESTERASE, URINALYSIS Negative Negative    SQUAMOUS EPITHELIAL, URINALYSIS None Seen None Seen, 1 to 5 /hpf    ERYTHROCYTES, URINALYSIS 1 to 2 None Seen, 1 to 2 /hpf    LEUKOCYTES, URINALYSIS 1 to 5 None Seen, 1 to 5 /hpf    BACTERIA, URINALYSIS None Seen None Seen /hpf    HYALINE CASTS, URINALYSIS None Seen None Seen, 1 to 5 /lpf    MUCUS Present    COVID/Flu/RSV panel   Result Value Ref Range    Rapid SARS-COV-2 by PCR Not Detected Not Detected / Detected / Presumptive Positive / Inhibitors present    Influenza A by PCR Not Detected Not Detected    Influenza B by PCR Not Detected Not Detected    RSV BY PCR Not Detected Not Detected    Isolation Guidelines      Procedural Comment         EKG Results     An EKG was not performed at today's visit.    Radiology Results     Imaging Results          XR CHEST AP OR PA - PORTABLE (Final result)  Result time 12/13/21 17:26:13    Final result                 Impression:    IMPRESSION:  Minimal left lung base linear density may be atelectasis but is  indeterminant. Correlate clinically.                   Narrative:    CHEST 1 VIEW    HISTORY: fever    COMPARISON:  None.    FINDINGS:  Portable AP chest is supplied.   Cardiomediastinal contours are partially  obscured but mildly enlarged. Pulmonary vascularity is within normal  limits. The costophrenic sulci are sharp. Minimal linear density left lung  base may be atelectasis but is indeterminant.                                ED Medication Orders (From admission, onward)    Ordered Start     Status Ordering Provider    12/13/21 1632 12/13/21 1632  carbamide peroxide (DEBROX) 6.5 % otic solution 5 drop  ONCE         Last MAR action: Given PAVEL VALENTINO    Vitals  Vitals:    12/13/21 1621 12/13/21 2015   BP: (!) 188/79 (!) 202/91   BP Location: LUE - Left upper extremity    Patient Position: Semi-Bennett's    Pulse: 72 68   Resp: 18    Temp: 97.7 °F (36.5 °C)    TempSrc: Oral    SpO2: 96% 97%   Weight: 75.3 kg (166 lb 0.1 oz)        ED Course  Initial Impression: I performed the initial assessment and evaluation of the patient.     4:30 PM I attempted to call pt's facility [facility], but there was no answer. Their voicemail box was also full, and I was unable to leave a message.     4:33 PM Spoke with Brigitte, sister, guardian. She was told that he has been running a low grade fever ~99 and that he has been having a decreased appetite. The facility tried to take him to  earlier, but they would not see him because he didn't have a valid picture ID. She notes that they thought he could possibly have an ear infection here. Brigitte reports that around strangers pt will not talk much, and that is normal for him. She reports that he is supposed to have his booster vaccine tomorrow. I discussed with her my plan for covid/flu/RSV swab, urinalysis, CXR, and ear drops to help soften his ear wax.     4:44 PM THERESA Dennis from [facility]. Concerned because he has been having runny nose, decreased appetite, low grade fever.  She also reports that patient is supposed to get his booster shot tomorrow.    7:27 PM recheck on the patient who is resting in bed comfortably.   He has a care worker from his group home here at his bedside.  RN is currently irrigating his right ear.  We discussed plan for urinalysis here.  I updated them on the results of his Covid/Flu/RSV  ear irrigation, worker is here, plan for urinalysis here    8:18 PM I rechecked on the pt who is resting comfortable. On reinspection of his ears his right canal appears normal, but his TM is erythematous and bulging. On the left canal also appears normal, but he does have a persistence of his cerumen impaction on the left. I updated pt's care worker on plan for treatment of an otitis media. The pt understands and agrees with the plan. All questions were addressed.     8:22 PM I called and spoke with Kimmy, pt's guardian.  I updated her on the results of patient's COVID/flu/RSV panel, urinalysis, and chest x-ray.  I informed her of patient's initial bilateral cerumen impactions and persistent left cerumen impaction.  We discussed plan for treatment for otitis media as well as continued care for his cerumen impaction on his left ear.  I advised the patient follows up with his primary care doctor.  If patient is having new or worsening symptoms advised that they bring him back to the emergency department for re-evaluation.    MDM  Critical Care time spent on this patient outside of billable procedures:  None    The patient was provided with a recommendation to follow up with a primary care provider and obtain reassessment of his/her blood pressure within three months.      Clinical impression  The primary encounter diagnosis was Right otitis media, unspecified otitis media type. A diagnosis of Bilateral impacted cerumen was also pertinent to this visit.    Follow Up:  [facility]  [address]  [facility] [address]  Call   [phone] to make an appointment for follow-up and reevaluation of symptoms, If new or worsening symptoms return to the ED       Discharge  Medication List as of 12/13/2021  8:22 PM      START taking these medications    Details   amoxicillin (AMOXIL) 875 MG tablet Take 1 tablet by mouth 2 times daily for 7 days.Eprescribchago, Disp-14 tablet, R-0             Pt is discharged  ____________________________________________________________________      Tara Prince PA-C  Dictation # [id number]  Attending Physician: Jamie Arciniega DO  Attending Physician IDX: [id number]                       Tara Prince PA-C  12/13/21 1792

## 2022-05-25 NOTE — PROGRESS NOTES
Problem: Mobility Impaired (Adult and Pediatric)  Goal: *Acute Goals and Plan of Care (Insert Text)  Description: FUNCTIONAL STATUS PRIOR TO ADMISSION: Patient poor historian giving conflicting reports of PLOF. Uses cane or walker for ambulation    HOME SUPPORT PRIOR TO ADMISSION: Patient stating she is homeless. Per chart she lives in [place] and was visiting family in [facility]. Per CM, she has an address in [place]. Uncertain if she lives alone and/or has any support. Physical Therapy Goals  Initiated 5/19/2022  1. Patient will move from supine to sit and sit to supine , scoot up and down, and roll side to side in bed with independence within 7 day(s). 2.  Patient will transfer from bed to chair and chair to bed with supervision/set-up using the least restrictive device within 7 day(s). 3.  Patient will perform sit to stand with supervision/set-up within 7 day(s). 4.  Patient will ambulate with supervision/set-up for 100 feet with the least restrictive device within 7 day(s). 5.  Patient will ascend/descend 4 stairs with 1 handrail(s) with supervision/set-up within 7 day(s). Outcome: Not Met     PHYSICAL THERAPY TREATMENT  Patient: Juarez hSabazz (25 y.o. female)  Date: 5/25/2022  Diagnosis: GI bleed [K92.2] <principal problem not specified>  Procedure(s) (LRB):  ESOPHAGOGASTRODUODENOSCOPY (EGD) (N/A) 8 Days Post-Op  Precautions:  Fall   Chart, physical therapy assessment, plan of care and goals were reviewed. ASSESSMENT  Pt in bed on arrival, agreeable to out of bed efforts and gait. Patient continues with skilled PT services and is not progressing towards goals. She is however maintaining short distance gait efforts in room. Pt is willing to amb. To door and back, seated rest to take pills nurse brought in, and then, when she is told by her nurse that one of her pain meds. Was discontinued this am, she becomes frustrated and says she is no longer getting up.   Pt is educated to complete BLE exercises and continue amb. Efforts with nursing. .. she is declining further gait or exercises at this time. Anticipate discharge once placement is achieved. Current Level of Function Impacting Discharge (mobility/balance): bed mob. Mod. I ; transfers S ; gait SBA with RW    Other factors to consider for discharge: self-limiting behaviors ; pain ratings         PLAN :  Patient continues to benefit from skilled intervention to address the above impairments. Continue treatment per established plan of care. to address goals. Recommendation for discharge: (in order for the patient to meet his/her long term goals)  Therapy up to 5 days/week in SNF setting (per chart)    This discharge recommendation:  Has been made in collaboration with the attending provider and/or case management    IF patient discharges home will need the following DME: to be determined (TBD)       SUBJECTIVE:   Patient stated I can't do anything else. .. do they just want me to suffer?     OBJECTIVE DATA SUMMARY:   Critical Behavior:  Neurologic State: Alert  Orientation Level: Oriented X4  Cognition: Follows commands  Safety/Judgement: Fall prevention  Functional Mobility Training:  Bed Mobility:  Rolling: Modified independent  Supine to Sit: Modified independent  Sit to Supine: Modified independent  Scooting: Modified independent        Transfers:  Sit to Stand: Supervision  Stand to Sit: Stand-by assistance                             Balance:  Sitting: Intact  Standing: Impaired  Standing - Static: Constant support;Good (RW)  Standing - Dynamic : Constant support;Good (RW)    Ambulation/Gait Training:  Distance (ft): 20 Feet (ft)  Assistive Device: Gait belt;Walker, rolling  Ambulation - Level of Assistance: Stand-by assistance        Gait Abnormalities: Antalgic;Decreased step clearance              Speed/Nahomy: Pace decreased (<100 feet/min)  Step Length: Left shortened;Right shortened        Interventions: Verbal cues       Therapeutic Exercises:   Pt is not interested in exercise education/instruction    Pain Rating:  C/o \"pain all over\" rated 7/10    Activity Tolerance:   Fair and limited by her c/o pain    After treatment patient left in no apparent distress:   Call bell within reach and in bed, nurse present    COMMUNICATION/COLLABORATION:   The patients plan of care was discussed with: Registered nurse.      Marita Mclain, PT, DPT   Time Calculation: 9 mins

## 2022-05-26 LAB
ALBUMIN SERPL-MCNC: 2.9 G/DL (ref 3.5–5)
ANION GAP SERPL CALC-SCNC: 6 MMOL/L (ref 5–15)
BASOPHILS # BLD: 0.1 K/UL (ref 0–0.1)
BASOPHILS NFR BLD: 1 % (ref 0–1)
BUN SERPL-MCNC: 10 MG/DL (ref 6–20)
BUN/CREAT SERPL: 16 (ref 12–20)
CALCIUM SERPL-MCNC: 8.9 MG/DL (ref 8.5–10.1)
CHLORIDE SERPL-SCNC: 102 MMOL/L (ref 97–108)
CO2 SERPL-SCNC: 29 MMOL/L (ref 21–32)
CREAT SERPL-MCNC: 0.64 MG/DL (ref 0.55–1.02)
DIFFERENTIAL METHOD BLD: ABNORMAL
EOSINOPHIL # BLD: 0.1 K/UL (ref 0–0.4)
EOSINOPHIL NFR BLD: 1 % (ref 0–7)
ERYTHROCYTE [DISTWIDTH] IN BLOOD BY AUTOMATED COUNT: 14.8 % (ref 11.5–14.5)
GLUCOSE SERPL-MCNC: 93 MG/DL (ref 65–100)
HCT VFR BLD AUTO: 30.2 % (ref 35–47)
HGB BLD-MCNC: 9.3 G/DL (ref 11.5–16)
IMM GRANULOCYTES # BLD AUTO: 0 K/UL (ref 0–0.04)
IMM GRANULOCYTES NFR BLD AUTO: 0 % (ref 0–0.5)
LYMPHOCYTES # BLD: 1.8 K/UL (ref 0.8–3.5)
LYMPHOCYTES NFR BLD: 31 % (ref 12–49)
MCH RBC QN AUTO: 27.7 PG (ref 26–34)
MCHC RBC AUTO-ENTMCNC: 30.8 G/DL (ref 30–36.5)
MCV RBC AUTO: 89.9 FL (ref 80–99)
MONOCYTES # BLD: 0.6 K/UL (ref 0–1)
MONOCYTES NFR BLD: 10 % (ref 5–13)
NEUTS SEG # BLD: 3.2 K/UL (ref 1.8–8)
NEUTS SEG NFR BLD: 57 % (ref 32–75)
NRBC # BLD: 0 K/UL (ref 0–0.01)
NRBC BLD-RTO: 0 PER 100 WBC
PHOSPHATE SERPL-MCNC: 4 MG/DL (ref 2.6–4.7)
PLATELET # BLD AUTO: 471 K/UL (ref 150–400)
PMV BLD AUTO: 8.2 FL (ref 8.9–12.9)
POTASSIUM SERPL-SCNC: 4.2 MMOL/L (ref 3.5–5.1)
RBC # BLD AUTO: 3.36 M/UL (ref 3.8–5.2)
SODIUM SERPL-SCNC: 137 MMOL/L (ref 136–145)
WBC # BLD AUTO: 5.7 K/UL (ref 3.6–11)

## 2022-05-26 PROCEDURE — 74011250637 HC RX REV CODE- 250/637: Performed by: HOSPITALIST

## 2022-05-26 PROCEDURE — 74011250637 HC RX REV CODE- 250/637: Performed by: SPECIALIST

## 2022-05-26 PROCEDURE — 74011250636 HC RX REV CODE- 250/636: Performed by: PHYSICIAN ASSISTANT

## 2022-05-26 PROCEDURE — 74011000250 HC RX REV CODE- 250: Performed by: NURSE PRACTITIONER

## 2022-05-26 PROCEDURE — 74011250637 HC RX REV CODE- 250/637: Performed by: INTERNAL MEDICINE

## 2022-05-26 PROCEDURE — 94761 N-INVAS EAR/PLS OXIMETRY MLT: CPT

## 2022-05-26 PROCEDURE — 74011000250 HC RX REV CODE- 250: Performed by: INTERNAL MEDICINE

## 2022-05-26 PROCEDURE — 94640 AIRWAY INHALATION TREATMENT: CPT

## 2022-05-26 PROCEDURE — 74011250636 HC RX REV CODE- 250/636: Performed by: HOSPITALIST

## 2022-05-26 PROCEDURE — 80069 RENAL FUNCTION PANEL: CPT

## 2022-05-26 PROCEDURE — 74011250637 HC RX REV CODE- 250/637: Performed by: NURSE PRACTITIONER

## 2022-05-26 PROCEDURE — 65270000046 HC RM TELEMETRY

## 2022-05-26 PROCEDURE — 97530 THERAPEUTIC ACTIVITIES: CPT | Performed by: PHYSICAL THERAPIST

## 2022-05-26 PROCEDURE — 36415 COLL VENOUS BLD VENIPUNCTURE: CPT

## 2022-05-26 PROCEDURE — 85025 COMPLETE CBC W/AUTO DIFF WBC: CPT

## 2022-05-26 RX ORDER — MORPHINE SULFATE 2 MG/ML
1 INJECTION, SOLUTION INTRAMUSCULAR; INTRAVENOUS ONCE
Status: COMPLETED | OUTPATIENT
Start: 2022-05-26 | End: 2022-05-26

## 2022-05-26 RX ADMIN — BUDESONIDE 250 MCG: 0.25 INHALANT RESPIRATORY (INHALATION) at 20:17

## 2022-05-26 RX ADMIN — SODIUM CHLORIDE, PRESERVATIVE FREE 10 ML: 5 INJECTION INTRAVENOUS at 14:04

## 2022-05-26 RX ADMIN — SUCRALFATE 1 G: 1 TABLET ORAL at 16:44

## 2022-05-26 RX ADMIN — PANTOPRAZOLE SODIUM 40 MG: 40 TABLET, DELAYED RELEASE ORAL at 06:37

## 2022-05-26 RX ADMIN — CLONAZEPAM 0.5 MG: 0.5 TABLET ORAL at 08:48

## 2022-05-26 RX ADMIN — GABAPENTIN 300 MG: 300 CAPSULE ORAL at 16:43

## 2022-05-26 RX ADMIN — POTASSIUM BICARBONATE 20 MEQ: 782 TABLET, EFFERVESCENT ORAL at 17:37

## 2022-05-26 RX ADMIN — LEVOTHYROXINE SODIUM 150 MCG: 0.15 TABLET ORAL at 06:15

## 2022-05-26 RX ADMIN — ONDANSETRON HYDROCHLORIDE 4 MG: 2 INJECTION, SOLUTION INTRAMUSCULAR; INTRAVENOUS at 01:04

## 2022-05-26 RX ADMIN — HYDROCODONE BITARTRATE AND ACETAMINOPHEN 1 TABLET: 5; 325 TABLET ORAL at 06:14

## 2022-05-26 RX ADMIN — HYDROCODONE BITARTRATE AND ACETAMINOPHEN 1 TABLET: 5; 325 TABLET ORAL at 18:37

## 2022-05-26 RX ADMIN — BUTALBITAL, ACETAMINOPHEN, AND CAFFEINE 1 TABLET: 50; 325; 40 TABLET ORAL at 17:43

## 2022-05-26 RX ADMIN — PANTOPRAZOLE SODIUM 40 MG: 40 TABLET, DELAYED RELEASE ORAL at 16:44

## 2022-05-26 RX ADMIN — ONDANSETRON HYDROCHLORIDE 4 MG: 2 INJECTION, SOLUTION INTRAMUSCULAR; INTRAVENOUS at 08:45

## 2022-05-26 RX ADMIN — HYDROCODONE BITARTRATE AND ACETAMINOPHEN 1 TABLET: 5; 325 TABLET ORAL at 10:46

## 2022-05-26 RX ADMIN — HYDROCODONE BITARTRATE AND ACETAMINOPHEN 1 TABLET: 5; 325 TABLET ORAL at 14:47

## 2022-05-26 RX ADMIN — SENNOSIDES AND DOCUSATE SODIUM 1 TABLET: 50; 8.6 TABLET ORAL at 08:24

## 2022-05-26 RX ADMIN — SENNOSIDES AND DOCUSATE SODIUM 1 TABLET: 50; 8.6 TABLET ORAL at 17:37

## 2022-05-26 RX ADMIN — BUTALBITAL, ACETAMINOPHEN, AND CAFFEINE 1 TABLET: 50; 325; 40 TABLET ORAL at 06:14

## 2022-05-26 RX ADMIN — GABAPENTIN 300 MG: 300 CAPSULE ORAL at 22:28

## 2022-05-26 RX ADMIN — BUDESONIDE 250 MCG: 0.25 INHALANT RESPIRATORY (INHALATION) at 07:57

## 2022-05-26 RX ADMIN — SUCRALFATE 1 G: 1 TABLET ORAL at 22:27

## 2022-05-26 RX ADMIN — HYDROCODONE BITARTRATE AND ACETAMINOPHEN 1 TABLET: 5; 325 TABLET ORAL at 22:27

## 2022-05-26 RX ADMIN — ONDANSETRON HYDROCHLORIDE 4 MG: 2 INJECTION, SOLUTION INTRAMUSCULAR; INTRAVENOUS at 14:04

## 2022-05-26 RX ADMIN — CLONAZEPAM 0.5 MG: 0.5 TABLET ORAL at 00:59

## 2022-05-26 RX ADMIN — SODIUM CHLORIDE, PRESERVATIVE FREE 10 ML: 5 INJECTION INTRAVENOUS at 06:16

## 2022-05-26 RX ADMIN — POTASSIUM BICARBONATE 20 MEQ: 782 TABLET, EFFERVESCENT ORAL at 08:23

## 2022-05-26 RX ADMIN — GABAPENTIN 300 MG: 300 CAPSULE ORAL at 08:24

## 2022-05-26 RX ADMIN — HYDROCODONE BITARTRATE AND ACETAMINOPHEN 1 TABLET: 5; 325 TABLET ORAL at 00:59

## 2022-05-26 RX ADMIN — SUCRALFATE 1 G: 1 TABLET ORAL at 10:46

## 2022-05-26 RX ADMIN — POLYETHYLENE GLYCOL 3350 17 G: 17 POWDER, FOR SOLUTION ORAL at 08:23

## 2022-05-26 RX ADMIN — HYDROXYCHLOROQUINE SULFATE 200 MG: 200 TABLET ORAL at 08:24

## 2022-05-26 RX ADMIN — ONDANSETRON HYDROCHLORIDE 4 MG: 2 INJECTION, SOLUTION INTRAMUSCULAR; INTRAVENOUS at 19:51

## 2022-05-26 RX ADMIN — FERROUS SULFATE TAB 325 MG (65 MG ELEMENTAL FE) 325 MG: 325 (65 FE) TAB at 08:23

## 2022-05-26 RX ADMIN — FLUOXETINE HYDROCHLORIDE 60 MG: 20 CAPSULE ORAL at 08:23

## 2022-05-26 RX ADMIN — FUROSEMIDE 40 MG: 40 TABLET ORAL at 08:23

## 2022-05-26 RX ADMIN — BUTALBITAL, ACETAMINOPHEN, AND CAFFEINE 1 TABLET: 50; 325; 40 TABLET ORAL at 12:14

## 2022-05-26 RX ADMIN — CLONAZEPAM 0.5 MG: 0.5 TABLET ORAL at 16:44

## 2022-05-26 RX ADMIN — MORPHINE SULFATE 1 MG: 2 INJECTION, SOLUTION INTRAMUSCULAR; INTRAVENOUS at 17:37

## 2022-05-26 RX ADMIN — SODIUM CHLORIDE, PRESERVATIVE FREE 10 ML: 5 INJECTION INTRAVENOUS at 21:27

## 2022-05-26 RX ADMIN — SUCRALFATE 1 G: 1 TABLET ORAL at 06:37

## 2022-05-26 RX ADMIN — LIOTHYRONINE SODIUM 5 MCG: 5 TABLET ORAL at 08:24

## 2022-05-26 NOTE — PROGRESS NOTES
End of Shift Note    Bedside shift change report given to [address] (oncoming nurse) by Tamera Blackwell RN (offgoing nurse). Report included the following information SBAR, Kardex, Intake/Output and MAR    Shift worked:  7p-7a     Shift summary and any significant changes:     Patient stated she was feeling dehydrated. Offered fluids. Patient stated she could not drink water. PAtient educated of benefits of drinkng water     Concerns for physician to address:  None     Zone phone for oncoming shift:   [phone]       Activity:  Activity Level: Up with Assistance  Number times ambulated in hallways past shift: 0  Number of times OOB to chair past shift: 1    Cardiac:   Cardiac Monitoring: No      Cardiac Rhythm: Sinus Tachy    Access:   Current line(s): PIV     Genitourinary:   Urinary status: voiding    Respiratory:   O2 Device: None (Room air)  Chronic home O2 use?: NO  Incentive spirometer at bedside: YES       GI:  Last Bowel Movement Date: 05/23/22  Current diet:  ADULT ORAL NUTRITION SUPPLEMENT Lunch; Protein Modular  DIET ONE TIME MESSAGE  ADULT DIET Dysphagia - Pureed  Passing flatus: YES  Tolerating current diet: NO       Pain Management:   Patient states pain is manageable on current regimen: YES    Skin:  Quan Score: 20  Interventions: increase time out of bed    Patient Safety:  Fall Score:  Total Score: 3  Interventions: assistive device (walker, cane, etc), gripper socks and pt to call before getting OOB  High Fall Risk: Yes    Length of Stay:  Expected LOS: 4d 14h  Actual LOS: [address], RN

## 2022-05-26 NOTE — PROGRESS NOTES
Transition of Care Plan:     RUR:12%  Disposition: SNF-[facility] w/possible LTC  Follow up appointments:  DME needed:n/a-pt owns a walker  Transportation at [address] [place] [address] Loop or means to access home:        IM Medicare Letter:2nd IM provided on 5/25  Is patient a BCPI-A Bundle:                      If yes, was Bundle Letter given?:    Is patient a  and connected with the [facility]?                If yes, was [employer] transfer form completed and [facility] notified? Caregiver Contact:, Karissa Shay [phone]  Discharge Caregiver contacted prior to discharge? Care Conference needed? :         CM confirmed, [facility] can accept pt anytime. A d/c order was placed yesterday. CM provided pt w/2nd IM yesterday. Patient stated she was going to exercise her right to appeal the d/c. CM will continue to follow.     Ronald Sumeet  Ext [phone]

## 2022-05-26 NOTE — PROGRESS NOTES
[facility] [place] at [address], [place], [address] [facility]  W: [phone] F: [phone]    Patient called office to request consult from Hematology. Discussed with Dr. Shea Easley, patient was seen OP (her primary Hematologist is in [place]) and requested IV iron. She then went to the ED for GI Bleed and has been IP at [facility]. Reviewed chart, patient has already received IV iron and there is no role for Hematology input at this time. Did speak with patient and inform her, she reports she is appealing her discharge. Recommended follow up with primary Hematologist in [place].

## 2022-05-26 NOTE — PROGRESS NOTES
Problem: Mobility Impaired (Adult and Pediatric)  Goal: *Acute Goals and Plan of Care (Insert Text)  Description: FUNCTIONAL STATUS PRIOR TO ADMISSION: Patient poor historian giving conflicting reports of PLOF. Uses cane or walker for ambulation    HOME SUPPORT PRIOR TO ADMISSION: Patient stating she is homeless. Per chart she lives in [place] and was visiting family in [place]. Per CM, she has an address in [place]. Uncertain if she lives alone and/or has any support. Goals reviewed 5/26/2022 and remain appropriate for next 7 days. Physical Therapy Goals  Initiated 5/19/2022  1. Patient will move from supine to sit and sit to supine , scoot up and down, and roll side to side in bed with independence within 7 day(s). 2.  Patient will transfer from bed to chair and chair to bed with supervision/set-up using the least restrictive device within 7 day(s). 3.  Patient will perform sit to stand with supervision/set-up within 7 day(s). 4.  Patient will ambulate with supervision/set-up for 100 feet with the least restrictive device within 7 day(s). 5.  Patient will ascend/descend 4 stairs with 1 handrail(s) with supervision/set-up within 7 day(s). Note:   PHYSICAL THERAPY TREATMENT: WEEKLY REASSESSMENT  Patient: Aileen Morrison (76 y.o. female)  Date: 5/26/2022  Primary Diagnosis: GI bleed [K92.2]  Procedure(s) (LRB):  ESOPHAGOGASTRODUODENOSCOPY (EGD) (N/A) 9 Days Post-Op   Precautions: fall         ASSESSMENT  Patient continues with skilled PT services and is not progressing towards goals. Patient's therapy sessions have been limited by pain in multiple joints and patient's frustration in dealing with this pain. Patient in semi patel's upon arrival; agreeable to limited mobility due to pain in spine, right knee, and shoulders. Patient's right knee with decreased flexion due to pain (\"popping\" and slight slide to outside). Patient states her knee feels as if it's out of place.   Able to come to EOB with modified independence using log roll technique with good sitting balance observed. Patient ambulated short distance in room to chair with rolling walker and CGA with slow, antalgic gait. Up in chair at end of session. Recommend SNF at discharge. Patient's progression toward goals since last assessment: slow progress    Current Level of Function Impacting Discharge (mobility/balance): CGA    Functional Outcome Measure: The patient scored 55/100 on the Barthel Index outcome measure which is indicative of 45% decline in mobility. Other factors to consider for discharge: pain control         PLAN :  Goals have been updated based on progression since last assessment. Patient continues to benefit from skilled intervention to address the above impairments. Recommendations and Planned Interventions: transfer training, gait training, therapeutic exercises, and therapeutic activities      Frequency/Duration: Patient will be followed by physical therapy:  3 times a week to address goals. Recommendation for discharge: (in order for the patient to meet his/her long term goals)  Therapy up to 5 days/week in SNF setting    This discharge recommendation:  Has been made in collaboration with the attending provider and/or case management    IF patient discharges home will need the following DME: to be determined (TBD)         SUBJECTIVE:   Patient stated Everything hurts.     OBJECTIVE DATA SUMMARY:   HISTORY:    Past Medical History:   Diagnosis Date    Arthritis     Iron deficiency     Lupus ([facility] [address]     Osteoporosis     Sarcoidosis      Past Surgical History:   Procedure Laterality Date    HX GASTRIC BYPASS      gastric sleeve    HX HERNIA REPAIR  2013    needs a repat she says       Personal factors and/or comorbidities impacting plan of care: joint pain throughout    [address]: Other (comment) (homeless)  # Steps to Enter: 0  One/Two Story Residence: Other (Comment)  # of Interior Steps: 0  Living Alone: Yes (homeless)  Support Systems: Spouse/Significant Other,Other Family Member(s)  Patient Expects to be Discharged to[de-identified] Skilled nursing facility  Current DME Used/Available at Home: Walker, rolling    EXAMINATION/PRESENTATION/DECISION MAKING:   Critical Behavior:  Neurologic State: Alert  Orientation Level: Oriented X4  Cognition: Follows commands  Safety/Judgement: Fall prevention  Hearing: Auditory  Auditory Impairment: None  Skin:  intact  Edema: right knee  Range Of Motion:  AROM: Generally decreased, functional           PROM: Generally decreased, functional           Strength:    Strength: Generally decreased, functional                    Tone & Sensation:   Tone: Normal              Sensation: Impaired               Coordination:  Coordination: Generally decreased, functional  Vision:      Functional Mobility:  Bed Mobility:  Rolling: Modified independent  Supine to Sit: Modified independent     Scooting: Modified independent  Transfers:  Sit to Stand: Contact guard assistance  Stand to Sit: Contact guard assistance        Bed to Chair: Contact guard assistance              Balance:   Sitting: Intact  Standing: Impaired  Standing - Static: Constant support; Fair  Standing - Dynamic : Constant support; Fair  Ambulation/Gait Training:  Distance (ft): 12 Feet (ft)  Assistive Device: Gait belt;Walker, rolling  Ambulation - Level of Assistance: Contact guard assistance        Gait Abnormalities: Antalgic;Decreased step clearance  Right Side Weight Bearing: Full  Left Side Weight Bearing: Full  Base of Support: Shift to left  Stance: Right decreased  Speed/Nahomy: Pace decreased (<100 feet/min)  Step Length: Left shortened;Right shortened                                   Therapeutic Exercises:   Patient can only tolerate ankle pumps    Functional Measure:  Barthel Index:    Bathin  Bladder: 10  Bowels: 10  Groomin  Dressin  Feeding: 10  Mobility: 0  Stairs: 0  Toilet Use: 5  Transfer (Bed to Chair and Back): 10  Total: 55/100       The Barthel ADL Index: Guidelines  1. The index should be used as a record of what a patient does, not as a record of what a patient could do. 2. The main aim is to establish degree of independence from any help, physical or verbal, however minor and for whatever reason. 3. The need for supervision renders the patient not independent. 4. A patient's performance should be established using the best available evidence. Asking the patient, friends/relatives and nurses are the usual sources, but direct observation and common sense are also important. However direct testing is not needed. 5. Usually the patient's performance over the preceding 24-48 hours is important, but occasionally longer periods will be relevant. 6. Middle categories imply that the patient supplies over 50 per cent of the effort. 7. Use of aids to be independent is allowed. Score Interpretation (from [address] [facility] [address])    Independent   60-79 Minimally independent   40-59 Partially dependent   20-39 Very dependent   <20 Totally dependent     -Chuck Harding., Barthel, D.W. (1965). Functional evaluation: the Barthel Index. [address] [facility] ([address] (1997). The Barthel activities of daily living index: self-reporting versus actual performance in the old (> or = 75 years). Journal [address] 45(7), [address] [facility], [facility]., Rafaela Meehan.Adri. (1999). Measuring the change in disability after inpatient rehabilitation; comparison of the responsiveness of the Barthel Index and Functional [place] Measure. Journal of Neurology, Neurosurgery, and Psychiatry, 66(4), 840-206. Frederic Carmen N.J.A, Dimas Ruelas  WMartyJ.BETH, & Waldo Thomas, M.A. (2004) Assessment of post-stroke quality of life in cost-effectiveness studies: The usefulness of the Barthel Index and the EuroQoL-5D.  Quality of Life Research, 13, 427-43           Pain Rating:  No number given    Activity Tolerance:   Fair    After treatment patient left in no apparent distress:   Sitting in chair, Call bell within reach, and Bed / chair alarm activated    COMMUNICATION/EDUCATION:   The patients plan of care was discussed with: Occupational therapist and Registered nurse. Fall prevention education was provided and the patient/caregiver indicated understanding. and Patient/family agree to work toward stated goals and plan of care.     Thank you for this referral.  Selvin Lockett, PT   Time Calculation: 16 mins

## 2022-05-26 NOTE — PROGRESS NOTES
End of Shift Note    Bedside shift change report given to Jocelyne Patino RN (oncoming nurse) by Enma Hackett LPN (offgoing nurse). Report included the following information SBAR, Kardex and MAR    Shift worked:  7a-7p     Shift summary and any significant changes:    Patient has had high levels of anxiety regarding albumin levels, placement after discharge and accusing hospital staff of lying to her about things. States doctor told her \"Once a bed is available, you're going\". Patient also voicing concerns over hospitalist not placing consults for her doctors-perfect served Dr. Bryanna Cheung who said she can follow up with providers outpatient, patient highly upset by this stating she is going to call higher ups to complain about lack of help in hospital. Patient got OOB to chair x1. Patient requiring Klonopin, Norco and Fiorocet around the clock. Concerns for physician to address: See above. Zone phone for oncoming shift:  [phone]     Activity:  Activity Level: Up with Assistance  Number times ambulated in hallways past shift: 0  Number of times OOB to chair past shift: 1    Cardiac:   Cardiac Monitoring: No      Cardiac Rhythm: Sinus Tachy    Access:   Current line(s): PIV     Genitourinary:   Urinary status: voiding    Respiratory:   O2 Device: None (Room air)  Chronic home O2 use?: NO  Incentive spirometer at bedside: NO       GI:  Last Bowel Movement Date: 05/23/22  Current diet:  ADULT ORAL NUTRITION SUPPLEMENT Lunch; Protein Modular  DIET ONE TIME MESSAGE  ADULT DIET Dysphagia - Pureed  Passing flatus: YES  Tolerating current diet: YES       Pain Management:   Patient states pain is manageable on current regimen: NO    Skin:  Quan Score: 20  Interventions: increase time out of bed and nutritional support     Patient Safety:  Fall Score:  Total Score: 3  Interventions: bed/chair alarm and gripper socks  High Fall Risk: Yes    Length of Stay:  Expected LOS: 4d 14h  Actual LOS: [address] [facility] LPN

## 2022-05-26 NOTE — PROGRESS NOTES
Hospitalist Progress Note    NAME: Michael Bergman   :  1960   MRN:  [id number]     Admit date: 2022    Today's date: 22      Assessment / Plan:    Hypovolemic shock  resolved  Acute blood loss anemia   resolved  GI bleeding from Erosive esophagitis,   Iron deficiency  - Reportedly vomited blood PTA, hypotensive in ED  - S/p 2 L IVF in ED  - S/p 1 unit PRBCs   - EGD    Esophagus:             Lower 1/3 of esophagus with scarred appearing mucosa with some friability seen extending from 30-33 cm from incisors.              Slight oozing but no mucosal tear seen.  This appeared c/w partially healed LA Grade B erosive esophagitis.              No bx performed. Best Pott was no other nodularity seen or mucosal lesions in esophagus.          There was a moderate sized hiatal hernia with GEJ located at 40 cm form incisors.    Stomach:               Normal mucosa throughout the stomach that appeared cylindrical shaped in keeping with prior Gastric sleeve.                No ulcers in stomach.  Pylorus patent.   Duodenum:             Normal mucosa to second portion.     - Avoid NSAIDs  - Continue carafate with PPI  -She wants to be on puréed diet   - Iron sucrose 200mg IV x 1 ()  - On  p.o. iron supplement given iron studies compatible with iron deficiency anemia  -Follow-up with GI as OP        Deconditioned  DJD, knees  - was on NSAIDs for DJD.  Try hydrocodone prn.   Baseline walks sometimes by herself but frequently needs cane.  Does not drive.      - PT OT eval and treat.  DC planning to SNF     Abdominal pain  - Patient reported that she needs premedication for that reason  - LFTs obtained within normal limits  -  CT abdomen showed cholelithiasis without Choleycystitis  - Physical exam is benign  - Continue previous Protonix 40 mg twice daily     Bradycardia-resolved  Hypothyroidism  -TSH 0.10  -Home synthroid reduced from 175 mcg to 150 mcg  -also on cytomel PTA.  Needs repeat TSH in 4 weeks with PCP     COPD  -duoneb Q 6 hr  -budesonide neb Q 12     Schizoaffective disorder POA  Recent admit for clonopine/suboxone overdose in [address] [place] [address] several months ago  Chronic pain syndrome-palliative consulted appreciate note 5/25  No suboxone at d/c avoid narcotics  Ok for norco per palliative team  On neurontin   Continue psych meds  Was seen by psych but no new recommendations     Hx of lupus- continue Plaquenil follow-up with primary rheumatologist        Code status: Full  Prophylaxis: SCD's  Recommended Disposition:SNF placement pending,   medically stable at this time      Subjective:     Patient was seen and examined. Chart was reviewed. Patient continues to find excuses not to be discharged. Discussed with hematology no need for IV iron infusions at this time. Patient was discharged yesterday but has appealed and is pending status. No other acute issues medically stable for discharge at this time. Objective:     VITALS:   Last 24hrs VS reviewed since prior progress note. Most recent are:  Patient Vitals for the past 24 hrs:   Temp Pulse Resp BP SpO2   05/26/22 1138 98.8 °F (37.1 °C) 78 17 104/62 96 %   05/26/22 0804 98.6 °F (37 °C) 63 17 (!) 118/57 97 %   05/26/22 0757 -- -- -- -- 99 %   05/25/22 1930 99.2 °F (37.3 °C) (!) 103 16 137/80 98 %   05/25/22 1520 99.2 °F (37.3 °C) 73 17 118/68 98 %   05/25/22 1245 98.7 °F (37.1 °C) 68 17 (!) 106/58 98 %     No intake or output data in the 24 hours ending 05/26/22 1204     Wt Readings from Last 12 Encounters:   05/17/22 56.4 kg (124 lb 5.4 oz)   05/16/22 55.7 kg (122 lb 12.8 oz)   05/10/22 59.9 kg (132 lb)       PHYSICAL EXAM:    I had a face to face encounter and independently examined this patient on 05/26/22 as outlined below:    General: WD WN. EENT:  MMM  Resp:  No accessory muscle use  CV:  Regular  rhythm,  GI:  Soft, Non distended,   Neurologic:  No focal deficit  Psych:   Not anxious nor agitated  Skin:  No rashes. LABS:    Pertinent labs include:  Recent Labs     05/26/22  0357 05/25/22  0212 05/24/22  0255   WBC 5.7 5.7 6.8   HGB 9.3* 9.2* 9.1*   HCT 30.2* 29.9* 29.5*   * 535* 596*     Recent Labs     05/26/22  0357 05/25/22 0212 05/24/22  0255    138  --    K 4.2 3.6  --     103  --    CO2 29 30  --    GLU 93 99  --    BUN 10 11  --    CREA 0.64 0.70  --    CA 8.9 8.8  --    MG  --  2.3 2.3   PHOS 4.0 3.6  --    ALB 2.9* 2.8*  --          Current Facility-Administered Medications:     gabapentin (NEURONTIN) capsule 300 mg, 300 mg, Oral, TID, Bandar Reyna MD, 300 mg at 05/26/22 9254    HYDROcodone-acetaminophen (NORCO) 5-325 mg per tablet 1 Tablet, 1 Tablet, Oral, Q4H PRN, Anuja Alicia NP, 1 Tablet at 05/26/22 1046    senna-docusate (PERICOLACE) 8.6-50 mg per tablet 1 Tablet, 1 Tablet, Oral, BID, Bandar Reyna MD, 1 Tablet at 05/26/22 9370    polyethylene glycol (MIRALAX) packet 17 g, 17 g, Oral, DAILY, Bandar Reyna MD, 17 g at 05/26/22 6640    magnesium hydroxide (MILK OF MAGNESIA) 400 mg/5 mL oral suspension 30 mL, 30 mL, Oral, DAILY PRN, Bandar Reyna MD    potassium bicarb-citric acid (EFFER-K) tablet 20 mEq, 20 mEq, Oral, BID, Bandar Reyna MD, 20 mEq at 05/26/22 1406    ferrous sulfate tablet 325 mg, 1 Tablet, Oral, DAILY WITH BREAKFAST, Lanny Brody MD, 325 mg at 05/26/22 6410    clonazePAM (KlonoPIN) tablet 0.5 mg, 0.5 mg, Oral, Q8H PRN, Francisco Schwab MD, 0.5 mg at 05/26/22 0848    pantoprazole (PROTONIX) tablet 40 mg, 40 mg, Oral, ACB&D, Francisco Schwab MD, 40 mg at 05/26/22 9851    butalbital-acetaminophen-caffeine (FIORICET, ESGIC) -40 mg per tablet 1 Tablet, 1 Tablet, Oral, Q6H PRN, Francisco Schwab MD, 1 Tablet at 05/26/22 5531    levothyroxine (SYNTHROID) tablet 150 mcg, 150 mcg, Oral, 6am, Francisco Schwab MD, 150 mcg at 05/26/22 0615    albuterol-ipratropium (DUO-NEB) 2.5 MG-0.5 MG/3 ML, 3 mL, Nebulization, Q6H PRN, Francisco Schwab MD    liothyronine (CYTOMEL) tablet 5 mcg, 5 mcg, Oral, DAILY, Anne Calzada MD, 5 mcg at 05/26/22 1648    traZODone (DESYREL) tablet 25 mg, 25 mg, Oral, QHS PRN, Anne Calzada MD, 25 mg at 05/24/22 0212    furosemide (LASIX) tablet 40 mg, 40 mg, Oral, DAILY, Anne Calzada MD, 40 mg at 05/26/22 8671    hydrOXYchloroQUINE (PLAQUENIL) tablet 200 mg, 200 mg, Oral, DAILY, Anne Calzada MD, 200 mg at 05/26/22 0824    nicotine (NICODERM CQ) 21 mg/24 hr patch 1 Patch, 1 Patch, TransDERmal, DAILY, Debra Riley NP, 1 Patch at 05/26/22 0824    ondansetron (ZOFRAN) injection 4 mg, 4 mg, IntraVENous, Q6H, Yennifer Bills PA, 4 mg at 05/26/22 0845    sucralfate (CARAFATE) tablet 1 g, 1 g, Oral, AC&HS, Gera Barnett MD, 1 g at 05/26/22 1046    FLUoxetine (PROzac) capsule 60 mg, 60 mg, Oral, DAILY, Frances Akins MD, 60 mg at 05/26/22 4028    lidocaine 4 % patch 1 Patch, 1 Patch, TransDERmal, Q24H, Richa Riggs MD, 1 Patch at 05/25/22 1848    budesonide (PULMICORT) 250 mcg/2ml nebulizer susp, 250 mcg, Nebulization, BID RT, Debra Riley NP, 250 mcg at 05/26/22 0757    sodium chloride (NS) flush 5-40 mL, 5-40 mL, IntraVENous, Q8H, PaulieJazmineon Meter, NP, 10 mL at 05/26/22 0616    sodium chloride (NS) flush 5-40 mL, 5-40 mL, IntraVENous, PRN, [place]ia Risk Clenton Meter, NP    ondansetron (ZOFRAN ODT) tablet 4 mg, 4 mg, Oral, Q8H PRN **OR** ondansetron (ZOFRAN) injection 4 mg, 4 mg, IntraVENous, Q6H PRN, Debra Riley NP, 4 mg at 05/25/22 1847    0.9% sodium chloride infusion 250 mL, 250 mL, IntraVENous, PRN, Debra Riley Y, NP    Signed: Alysia Rodriguez MD

## 2022-05-26 NOTE — PROGRESS NOTES
OT note:  OT reviewed chart and pt had just worked with PT and did not want to work on any ADLs at chair level. She reports that her pain was too bad in her knee and her back.   Pt refusal and she was left sitting up in chair and will continue to follow

## 2022-05-26 NOTE — DISCHARGE SUMMARY
Hospitalist Discharge Summary     Patient ID:  Juarez Shabazz  [id number]  57 y.o.  1960 5/16/2022    PCP on record: Chelsea Patterson MD    Admit date: 5/16/2022  Discharge date and time: 5/26/2022    DISCHARGE DIAGNOSIS:  Hypovolemic shock  Acute blood loss anemia  GI bleed  Chronic pain syndrome  COPD  Schizoaffective disorder  Hypothyroidism  History of lupus  Arthritic knee pains    CONSULTATIONS:  IP CONSULT TO GASTROENTEROLOGY  IP CONSULT TO PSYCHOLOGY  IP CONSULT TO PALLIATIVE CARE - PROVIDER  IP CONSULT TO PAIN MANAGEMENT    Excerpted HPI from H&P of Nanda Young MD:   63 y/o F pt with PMH of COPD, iron deficiency anemia, peptic ulcer disease, lupus, sarcoidosis presented to ED with c/o SOB, N/V/abdominal pain. Pt reports vomiting blood yesterday but no hematemesis today.       Upon arrival to ED, pt hypotensive to 63/40, given 2 L IVF with improvement in BP. Labs significant for Hg of 8.3 (down from 10.3 on 5/10). GI consulted and recommended protonix and will see in am unless she becomes unstable with s/o active bleeding. ICU consulted for admission.     Upon my arrival pt's BP 92/60, HR 50.  C/o being cold and leg and abdominal pain. She is alert and oriented but with a disorganized speech pattern. Will be admitted to the ICU for further management.       ______________________________________________________________________  DISCHARGE SUMMARY/HOSPITAL COURSE:  for full details see H&P, daily progress notes, labs, consult notes.    Hypovolemic shock  resolved  Acute blood loss anemia   resolved  GI bleeding from Erosive esophagitis,   Iron deficiency  - Reportedly vomited blood PTA, hypotensive in ED  - S/p 2 L IVF in ED  - S/p 1 unit PRBCs 5/17  - EGD 5/18   Esophagus:             Lower 1/3 of esophagus with scarred appearing mucosa with some friability seen extending from 30-33 cm from incisors.              Slight oozing but no mucosal tear seen.  This appeared c/w partially healed LA Grade B erosive esophagitis.              No bx performed. Pauleen Vini was no other nodularity seen or mucosal lesions in esophagus.          There was a moderate sized hiatal hernia with GEJ located at 40 cm form incisors.    Stomach:               Normal mucosa throughout the stomach that appeared cylindrical shaped in keeping with prior Gastric sleeve.                No ulcers in stomach.  Pylorus patent. Duodenum:             Normal mucosa to second portion.     - Avoid NSAIDs  - Continue carafate with PPI  -She wants to be on puréed diet   - Iron sucrose 200mg IV x 1 (5/19)  - On  p.o. iron supplement given iron studies compatible with iron deficiency anemia  -Follow-up with GI as OP        Deconditioned  DJD, knees  - was on NSAIDs for DJD. Try hydrocodone prn. Baseline walks sometimes by herself but frequently needs cane. Does not drive.      - PT OT eval and treat. DC planning to SNF     Abdominal pain  - Patient reported that she needs premedication for that reason  - LFTs obtained within normal limits  -  CT abdomen showed cholelithiasis without Choleycystitis  - Physical exam is benign  - Continue previous Protonix 40 mg twice daily     Bradycardia-resolved  Hypothyroidism  -TSH 0.10  -Home synthroid reduced from 175 mcg to 150 mcg  -also on cytomel PTA.   Needs repeat TSH in 4 weeks with PCP    COPD  -duoneb Q 6 hr  -budesonide neb Q 12     Schizoaffective disorder POA  Recent admit for clonopine/suboxone overdose in [address] [place] [address] several months ago  Chronic pain syndrome-palliative consulted appreciate note 5/25  No suboxone at d/c avoid narcotics  Ok for norco per palliative team  On neurontin   Continue psych meds  Was seen by psych but no new recommendations     Hx of lupus- continue Plaquenil follow-up with primary rheumatologist        Code status: Full  Prophylaxis: SCD's  Recommended Disposition:SNF placement pending,   medically stable at this time        _______________________________________________________________________  Patient seen and examined by me on discharge day . Patient maximized inpatient benefit stay and cleared to discharge to skilled nursing facility  bed is available but she had appealed her discharge pending status at this time. Patient seen by palliative pain team before discharge recommending continuing [address] [place] [address] as needed and follow-up with her pain specialist in [place]. No other acute issues medically stable for discharge at this time. _______________________________________________________________________  DISCHARGE MEDICATIONS:   Current Discharge Medication List      START taking these medications    Details   albuterol-ipratropium (DUO-NEB) 2.5 mg-0.5 mg/3 ml nebu 3 mL by Nebulization route every six (6) hours as needed for Wheezing. Qty: 30 Nebule, Refills: 0  Start date: 5/25/2022      budesonide (PULMICORT) 0.25 mg/2 mL nbsp 2 mL by Nebulization route two (2) times a day. Qty: 1 Each, Refills: 0  Start date: 5/25/2022      ferrous sulfate 325 mg (65 mg iron) tablet Take 1 Tablet by mouth daily (with breakfast). Qty: 30 Tablet, Refills: 0  Start date: 5/26/2022      gabapentin (NEURONTIN) 300 mg capsule Take 1 Capsule by mouth three (3) times daily. Max Daily Amount: 900 mg. Qty: 15 Capsule, Refills: 0  Start date: 5/25/2022    Associated Diagnoses: Chronic pain of both knees      HYDROcodone-acetaminophen (NORCO) 5-325 mg per tablet Take 1 Tablet by mouth every four (4) hours as needed for Pain for up to 3 days. Max Daily Amount: 6 Tablets. Qty: 10 Tablet, Refills: 0  Start date: 5/25/2022, End date: 5/28/2022    Associated Diagnoses: Chronic pain of both knees      lidocaine 4 % patch As directed  Qty: 1 Each, Refills: 0  Start date: 5/25/2022      pantoprazole (PROTONIX) 40 mg tablet Take 1 Tablet by mouth Before breakfast and dinner.   Qty: 30 Tablet, Refills: 0  Start date: 5/25/2022      polyethylene glycol (MIRALAX) 17 gram packet Take 1 Packet by mouth daily. Qty: 1 Packet, Refills: 0  Start date: 5/26/2022      senna-docusate (PERICOLACE) 8.6-50 mg per tablet Take 1 Tablet by mouth two (2) times a day. Qty: 10 Tablet, Refills: 0  Start date: 5/25/2022      traZODone (DESYREL) 50 mg tablet Take 0.5 Tablets by mouth nightly as needed for Sleep. Qty: 10 Tablet, Refills: 0  Start date: 5/25/2022         CONTINUE these medications which have CHANGED    Details   butalbital-aspirin-caffeine (FIORINAL) -40 mg tablet Take 1 Tablet by mouth two (2) times daily as needed for Headache. Max Daily Amount: 2 Tablets. Qty: 10 Tablet, Refills: 0  Start date: 5/25/2022    Associated Diagnoses: Chronic pain of both knees      clonazePAM (KlonoPIN) 0.5 mg tablet Take 1 Tablet by mouth every eight (8) hours as needed for Anxiety. Max Daily Amount: 1.5 mg.  Qty: 10 Tablet, Refills: 0  Start date: 5/25/2022    Associated Diagnoses: Chronic pain of both knees      levothyroxine (SYNTHROID) 150 mcg tablet Take 1 Tablet by mouth every morning. Qty: 10 Tablet, Refills: 0  Start date: 5/26/2022         CONTINUE these medications which have NOT CHANGED    Details   sucralfate (CARAFATE) 1 gram tablet Take 1 g by mouth Before breakfast and dinner. azaTHIOprine (IMURAN) 50 mg tablet Take 50 mg by mouth daily. vortioxetine (Trintellix) 10 mg tablet Take 10 mg by mouth daily. FLUoxetine (PROzac) 20 mg capsule Take 60 mg by mouth daily. rosuvastatin (CRESTOR) 20 mg tablet Take 20 mg by mouth daily. hydrOXYchloroQUINE (PLAQUENIL) 200 mg tablet Take 200 mg by mouth daily. tiZANidine (ZANAFLEX) 4 mg tablet Take 4 mg by mouth nightly. liothyronine (CYTOMEL) 5 mcg tablet Take 5 mcg by mouth daily. promethazine (PHENERGAN) 6.25 mg/5 mL syrup Take 12.5 mg by mouth two (2) times daily as needed for Nausea.          STOP taking these medications       furosemide (LASIX) 40 mg tablet Comments:   Reason for Stopping:                 Patient Follow Up Instructions:    Diet-.   As per patient request  Activity slowly increase as tolerated to levels before  Check labs CBC/BMP Monday next week  Return to ER or call MD immediately if symptoms recur or get worse  Strict fall/aspiration/pressure ulcer precautions  Avoid NSAIDs    Follow-up Information     Follow up With Specialties Details Why Annabella Krause [facility] [address]  [phone]    Primary rheumatologist in 2 weeks        PCP after discharge from rehab in 1 to 2 weeks        Charmayne Aw, MD Gastroenterology In 3 weeks for GI issues [address]  [place]  [phone]      Primary psychiatrist in 3 to 4 weeks        Pain management clinic in 4 to 8 weeks            ________________________________________________________________    Risk of deterioration: Moderate due to multiple comorbidities    Condition at Discharge:  Stable  __________________________________________________________________    Disposition  SNF/LTC    ____________________________________________________________________    Code Status: Full Code  ___________________________________________________________________      Total time in minutes spent coordinating this discharge  35  minutes    Signed:  Bandar Carmona MD .

## 2022-05-26 NOTE — PROGRESS NOTES
Problem: Pressure Injury - Risk of  Goal: *Prevention of pressure injury  Description: Document Quan Scale and appropriate interventions in the flowsheet. Outcome: Progressing Towards Goal  Note: Pressure Injury Interventions:       Moisture Interventions: Absorbent underpads,Apply protective barrier, creams and emollients    Activity Interventions: Increase time out of bed    Mobility Interventions: PT/OT evaluation    Nutrition Interventions: Document food/fluid/supplement intake    Friction and Shear Interventions: HOB 30 degrees or less                Problem: Patient Education: Go to Patient Education Activity  Goal: Patient/Family Education  Outcome: Progressing Towards Goal     Problem: Falls - Risk of  Goal: *Absence of Falls  Description: Document Alfredo Fall Risk and appropriate interventions in the flowsheet.   Outcome: Progressing Towards Goal  Note: Fall Risk Interventions:  Mobility Interventions: Bed/chair exit alarm         Medication Interventions: Patient to call before getting OOB    Elimination Interventions: Call light in reach    History of Falls Interventions: Door open when patient unattended

## 2022-05-27 LAB
ERYTHROCYTE [DISTWIDTH] IN BLOOD BY AUTOMATED COUNT: 14.6 % (ref 11.5–14.5)
HCT VFR BLD AUTO: 28.7 % (ref 35–47)
HGB BLD-MCNC: 8.9 G/DL (ref 11.5–16)
MCH RBC QN AUTO: 28.1 PG (ref 26–34)
MCHC RBC AUTO-ENTMCNC: 31 G/DL (ref 30–36.5)
MCV RBC AUTO: 90.5 FL (ref 80–99)
NRBC # BLD: 0 K/UL (ref 0–0.01)
NRBC BLD-RTO: 0 PER 100 WBC
PLATELET # BLD AUTO: 478 K/UL (ref 150–400)
PMV BLD AUTO: 8.8 FL (ref 8.9–12.9)
RBC # BLD AUTO: 3.17 M/UL (ref 3.8–5.2)
WBC # BLD AUTO: 5.9 K/UL (ref 3.6–11)

## 2022-05-27 PROCEDURE — 74011250637 HC RX REV CODE- 250/637: Performed by: HOSPITALIST

## 2022-05-27 PROCEDURE — 74011000250 HC RX REV CODE- 250: Performed by: NURSE PRACTITIONER

## 2022-05-27 PROCEDURE — 74011250637 HC RX REV CODE- 250/637: Performed by: INTERNAL MEDICINE

## 2022-05-27 PROCEDURE — 36415 COLL VENOUS BLD VENIPUNCTURE: CPT

## 2022-05-27 PROCEDURE — 74011250637 HC RX REV CODE- 250/637: Performed by: SPECIALIST

## 2022-05-27 PROCEDURE — 74011250636 HC RX REV CODE- 250/636: Performed by: HOSPITALIST

## 2022-05-27 PROCEDURE — 74011000258 HC RX REV CODE- 258: Performed by: HOSPITALIST

## 2022-05-27 PROCEDURE — 85027 COMPLETE CBC AUTOMATED: CPT

## 2022-05-27 PROCEDURE — 65270000046 HC RM TELEMETRY

## 2022-05-27 PROCEDURE — 74011250637 HC RX REV CODE- 250/637: Performed by: NURSE PRACTITIONER

## 2022-05-27 PROCEDURE — 74011250636 HC RX REV CODE- 250/636: Performed by: PHYSICIAN ASSISTANT

## 2022-05-27 PROCEDURE — 74011000250 HC RX REV CODE- 250: Performed by: INTERNAL MEDICINE

## 2022-05-27 RX ORDER — KETOROLAC TROMETHAMINE 30 MG/ML
15 INJECTION, SOLUTION INTRAMUSCULAR; INTRAVENOUS ONCE
Status: COMPLETED | OUTPATIENT
Start: 2022-05-27 | End: 2022-05-27

## 2022-05-27 RX ADMIN — POLYETHYLENE GLYCOL 3350 17 G: 17 POWDER, FOR SOLUTION ORAL at 09:50

## 2022-05-27 RX ADMIN — HYDROXYCHLOROQUINE SULFATE 200 MG: 200 TABLET ORAL at 09:50

## 2022-05-27 RX ADMIN — SUCRALFATE 1 G: 1 TABLET ORAL at 17:21

## 2022-05-27 RX ADMIN — ONDANSETRON HYDROCHLORIDE 4 MG: 2 INJECTION, SOLUTION INTRAMUSCULAR; INTRAVENOUS at 13:30

## 2022-05-27 RX ADMIN — KETOROLAC TROMETHAMINE 15 MG: 30 INJECTION, SOLUTION INTRAMUSCULAR at 17:21

## 2022-05-27 RX ADMIN — FLUOXETINE HYDROCHLORIDE 60 MG: 20 CAPSULE ORAL at 09:50

## 2022-05-27 RX ADMIN — ONDANSETRON HYDROCHLORIDE 4 MG: 2 INJECTION, SOLUTION INTRAMUSCULAR; INTRAVENOUS at 01:48

## 2022-05-27 RX ADMIN — LIOTHYRONINE SODIUM 5 MCG: 5 TABLET ORAL at 09:00

## 2022-05-27 RX ADMIN — HYDROCODONE BITARTRATE AND ACETAMINOPHEN 1 TABLET: 5; 325 TABLET ORAL at 22:20

## 2022-05-27 RX ADMIN — HYDROCODONE BITARTRATE AND ACETAMINOPHEN 1 TABLET: 5; 325 TABLET ORAL at 09:50

## 2022-05-27 RX ADMIN — GABAPENTIN 300 MG: 300 CAPSULE ORAL at 22:20

## 2022-05-27 RX ADMIN — IRON SUCROSE 200 MG: 20 INJECTION, SOLUTION INTRAVENOUS at 17:59

## 2022-05-27 RX ADMIN — PANTOPRAZOLE SODIUM 40 MG: 40 TABLET, DELAYED RELEASE ORAL at 17:21

## 2022-05-27 RX ADMIN — SENNOSIDES AND DOCUSATE SODIUM 1 TABLET: 50; 8.6 TABLET ORAL at 17:21

## 2022-05-27 RX ADMIN — SENNOSIDES AND DOCUSATE SODIUM 1 TABLET: 50; 8.6 TABLET ORAL at 09:50

## 2022-05-27 RX ADMIN — CLONAZEPAM 0.5 MG: 0.5 TABLET ORAL at 08:38

## 2022-05-27 RX ADMIN — POTASSIUM BICARBONATE 20 MEQ: 782 TABLET, EFFERVESCENT ORAL at 09:50

## 2022-05-27 RX ADMIN — HYDROCODONE BITARTRATE AND ACETAMINOPHEN 1 TABLET: 5; 325 TABLET ORAL at 03:04

## 2022-05-27 RX ADMIN — LEVOTHYROXINE SODIUM 150 MCG: 0.15 TABLET ORAL at 06:48

## 2022-05-27 RX ADMIN — BUTALBITAL, ACETAMINOPHEN, AND CAFFEINE 1 TABLET: 50; 325; 40 TABLET ORAL at 08:38

## 2022-05-27 RX ADMIN — SODIUM CHLORIDE, PRESERVATIVE FREE 10 ML: 5 INJECTION INTRAVENOUS at 22:20

## 2022-05-27 RX ADMIN — ONDANSETRON HYDROCHLORIDE 4 MG: 2 INJECTION, SOLUTION INTRAMUSCULAR; INTRAVENOUS at 08:39

## 2022-05-27 RX ADMIN — CLONAZEPAM 0.5 MG: 0.5 TABLET ORAL at 01:48

## 2022-05-27 RX ADMIN — SODIUM CHLORIDE, PRESERVATIVE FREE 10 ML: 5 INJECTION INTRAVENOUS at 13:30

## 2022-05-27 RX ADMIN — PANTOPRAZOLE SODIUM 40 MG: 40 TABLET, DELAYED RELEASE ORAL at 06:48

## 2022-05-27 RX ADMIN — SUCRALFATE 1 G: 1 TABLET ORAL at 06:48

## 2022-05-27 RX ADMIN — GABAPENTIN 300 MG: 300 CAPSULE ORAL at 09:50

## 2022-05-27 RX ADMIN — SODIUM CHLORIDE, PRESERVATIVE FREE 10 ML: 5 INJECTION INTRAVENOUS at 06:48

## 2022-05-27 RX ADMIN — FUROSEMIDE 40 MG: 40 TABLET ORAL at 09:50

## 2022-05-27 RX ADMIN — BUTALBITAL, ACETAMINOPHEN, AND CAFFEINE 1 TABLET: 50; 325; 40 TABLET ORAL at 14:59

## 2022-05-27 RX ADMIN — CLONAZEPAM 0.5 MG: 0.5 TABLET ORAL at 17:23

## 2022-05-27 RX ADMIN — SUCRALFATE 1 G: 1 TABLET ORAL at 22:20

## 2022-05-27 RX ADMIN — BUTALBITAL, ACETAMINOPHEN, AND CAFFEINE 1 TABLET: 50; 325; 40 TABLET ORAL at 01:48

## 2022-05-27 RX ADMIN — ONDANSETRON 4 MG: 4 TABLET, ORALLY DISINTEGRATING ORAL at 17:21

## 2022-05-27 RX ADMIN — HYDROCODONE BITARTRATE AND ACETAMINOPHEN 1 TABLET: 5; 325 TABLET ORAL at 17:21

## 2022-05-27 RX ADMIN — HYDROCODONE BITARTRATE AND ACETAMINOPHEN 1 TABLET: 5; 325 TABLET ORAL at 13:30

## 2022-05-27 RX ADMIN — GABAPENTIN 300 MG: 300 CAPSULE ORAL at 17:21

## 2022-05-27 RX ADMIN — SUCRALFATE 1 G: 1 TABLET ORAL at 13:30

## 2022-05-27 NOTE — PROGRESS NOTES
Transition of Care Plan to SNF/Rehab    Communication to Patient/Family:  Met with patient and family and they are agreeable to the transition plan. The Plan for Transition of Care is related to the following treatment goals: The Patient and/or patient representative was provided with a choice of provider and agrees  with the discharge plan. Yes [x] No []    A Freedom of choice list was provided with basic dialogue that supports the patient's individualized plan of care/goals and shares the quality data associated with the providers. Yes [x] No []    SNF/Rehab Transition:  Patient has been accepted to [facility] SNF/Rehab and meets criteria for admission. Patient will transported by [facility] and expected to leave at 11am Sat 5/28    Communication to SNF/Rehab:  Bedside RN,  has been notified to update the transition plan to the facility and call report ([phone]). Discharge information has been updated on the AVS. And communicated to facility via [email or web address], or CC link. Discharge instructions to be fax'd to facility at [facility] #). Nursing Please include all hard scripts for controlled substances, med rec and dc summary, and AVS in packet. Reviewed and confirmed with facility, , can manage the patient care needs for the following:     True [place] with (X) only those applicable:  Medication:  []Medications are available at the facility  []IV Antibiotics    [x]Controlled Substance - hard copies available sent.   []Weekly Labs    Equipment:  []CPAP/BiPAP  []Wound Vacuum  []Rose or Urinary Device  []PICC/Central Line  []Nebulizer  []Ventilator    Treatment:  []Isolation (for MRSA, VRE, etc.)  []Surgical Drain Management  []Tracheostomy Care  []Dressing Changes  []Dialysis with transportation  []PEG Care  []Oxygen  []Daily Weights for Heart Failure    Dietary:  [x]Any diet limitations  []Tube Feedings   []Total Parenteral Management (TPN) ADULT ORAL NUTRITION SUPPLEMENT Lunch; Protein Modular  DIET ONE TIME MESSAGE  ADULT DIET Dysphagia - Pureed   Financial Resources:  []Medicaid Application Completed    []UAI Completed and copy given to pt/family  and copy given to pt/family  []A screening has previously been completed. []Level II Completed    [] Private pay individual who will not become   financially eligible for Medicaid within 6 months from admission to a [address] [place] [address] facility. [] Individual refused to have screening conducted. []Medicaid Application Completed    []The screening denied because it was determined individual did not need/did not qualify for nursing facility level of care. [] Out of state residents seeking direct admission to a [address] facility. [] Individuals who are inpatients of an out of state hospital, or in state or out of state veterans/ hospital and seek direct admission to a [address] facility  [] Individuals who are pateints or residents of a state owned/operated facility that is licensed by [employer] and seek direct admission to [address] [place] [address]  [] A screening not required for enrollment in [address] [address] [address] services as set out in [address] [place] [address] 45-  [] [facility]) staff shall perform screenings of the [facility] clients. Advanced Care Plan:  [x]Surrogate Decision Maker of Care  []POA  []Communicated Code Status and copy sent.  , Kane Fry    [phone]   -Other:    -   -

## 2022-05-27 NOTE — PROGRESS NOTES
Problem: Pressure Injury - Risk of  Goal: *Prevention of pressure injury  Description: Document Quan Scale and appropriate interventions in the flowsheet. Outcome: Progressing Towards Goal  Note: Pressure Injury Interventions:       Moisture Interventions: Absorbent underpads,Apply protective barrier, creams and emollients    Activity Interventions: Increase time out of bed    Mobility Interventions: HOB 30 degrees or less    Nutrition Interventions: Document food/fluid/supplement intake    Friction and Shear Interventions: HOB 30 degrees or less                Problem: Falls - Risk of  Goal: *Absence of Falls  Description: Document Alfredo Fall Risk and appropriate interventions in the flowsheet.   Outcome: Progressing Towards Goal  Note: Fall Risk Interventions:  Mobility Interventions: Bed/chair exit alarm         Medication Interventions: Patient to call before getting OOB    Elimination Interventions: Call light in reach    History of Falls Interventions: Door open when patient unattended

## 2022-05-27 NOTE — PROGRESS NOTES
Received notification from bedside RN about patient with regards to: right eye pain that was pre existent prior to admission but now complains of vision problem. No drainage noted, no obvious abnormality noted to sclera, and cornea on assessment.  Pupils equally reactive to light and accomodation  VS: /69, HR 80, RR 16, O2 sat 97% on RA    Intervention given: Naphcon eye drops PRN ordered

## 2022-05-27 NOTE — PROGRESS NOTES
Bedside and Verbal shift change report given to Hannah Cochran RN (oncoming nurse) by Estefany Villegas RN (offgoing nurse). Report included the following information SBAR, Kardex, Intake/Output, MAR and Recent Results.

## 2022-05-27 NOTE — PROGRESS NOTES
After Visit Summary   2018    Srikanth Graves    MRN: [id number]           Patient Information     Date Of Birth          1946        Visit Information        Provider Department      2018 1:50 PM Rohit Pastor MD [facility]        Care Instructions    1 Take medications even the day of seeing the doctor  2  1  To 2 week  for recheck blood pressure  3   TENTs, unit          Follow-ups after your visit        Who to contact     Please call your clinic at [phone] to:    Ask questions about your health    Make or cancel appointments    Discuss your medicines    Learn about your test results    Speak to your doctor            Additional Information About Your Visit        MyChart Information     Ph[employer] is an electronic gateway that provides easy, online access to your medical records. With Ph[employer]esis, you can request a clinic appointment, read your test results, renew a prescription or communicate with your care team.     To sign up for [employer] visit the website at [email or web address]   You will be asked to enter the access code listed below, as well as some personal information. Please follow the directions to create your username and password.     Your access code is: [email or web address]  Expires: 2018 12:03 PM     Your access code will  in 90 days. If you need help or a new code, please contact your [facility] or call [phone] for assistance.        Care EveryWhere ID     This is your Care EveryWhere ID. This could be used by other organizations to access your [facility] medical records  [id number]        Your Vitals Were     Pulse Temperature Respirations Pulse Oximetry          60 98.8  F (37.1  C) (Oral) 28 95%         Blood Pressure from Last 3 Encounters:   18 167/79   18 137/73   18 162/78    Weight from Last 3 Encounters:   18 174 lb 9.6 oz (79.2 kg)   18 173 lb (78.5 kg)   18 176 lb 6.4 oz (80  Contacted Dr. Xiomara Pablo to see if pt is discharged today. Made him aware that pt is refusing iron pill. MD to come see pt.   1300 - Representative from Medicare called stating that pt called to complain that pain was not managed with current regimen and pt was concerned that she was being discharged against her appeal. Informed her that the physician has been made aware of pt's concerns and pt has appealed her discharge. 1400 - Dr. Xiomara Pablo made aware of pt's phone call to Medicare and them calling me regarding her desire for them to [facility] the doctor give her more pain medication. \" Pt making random complaints of \"eye pain, toe pain, back pain, abdomen pain, etc.\" Pt talks in and out of conversation, changing frequently the topic of conversation. 1629 - Pt standing in hallway yelling at staff for more pain medication. Dr. Eh Camacho made aware of pt's inappropriate and drug seeking behavior. 1700 - Pt observed wandering the hallway with her walker looking for nurse for pain medications. Met pt in hallway and walked with her to her room. 1720 - Pt refusing IVP iron and requesting it to be diluted in 100ml NS. Called pharmacy and inquired as to whether this could be done. Pharmacist to change order and send. kg)              Today, you had the following     No orders found for display       Primary Care Provider Office Phone # Fax #    Rohit Pastor [phone]       [address] [place] [id number]        Equal Access to Services     STEPHANIE FLOWERS : Hadraman aad ku hadzuleyma Sanabria, waaxda luqadaha, qaybta kaalmada adeishda, evens ortiz wei salomon. So [facility] [phone].    ATENCIÓN: Si habla español, tiene a andres disposición servicios gratuitos de asistencia lingüística. Llame al [phone].    We comply with applicable federal civil rights laws and [place] laws. We do not discriminate on the basis of race, color, national origin, age, disability, sex, sexual orientation, or gender identity.            Thank you!     Thank you for choosing [facility]  for your care. Our goal is always to provide you with excellent care. Hearing back from our patients is one way we can continue to improve our services. Please take a few minutes to complete the written survey that you may receive in the mail after your visit with us. Thank you!             Your Updated Medication List - Protect others around you: Learn how to safely use, store and throw away your medicines at www.disposemymeds.org.          This list is accurate as of 4/2/18  2:08 PM.  Always use your most recent med list.                   Brand Name Dispense Instructions for use Diagnosis    acetaminophen 500 MG tablet    TYLENOL    180 tablet    Take 2 tablets (1,000 mg) by mouth 3 times daily    Chronic low back pain, unspecified back pain laterality, with sciatica presence unspecified       albuterol 108 (90 BASE) MCG/ACT Inhaler    PROAIR HFA/PROVENTIL HFA/VENTOLIN HFA    3 Inhaler    INHALE TWO PUFFS BY MOUTH FOUR TIMES A DAY AS NEEDED    Chronic obstructive pulmonary disease, unspecified COPD type (H)       aspirin 81 MG tablet     90 tablet    Take 1 tablet (81 mg) by mouth daily    Coronary artery disease of native artery  of native heart with stable angina pectoris (H)       atorvastatin 20 MG tablet    LIPITOR    90 tablet    Take 1 tablet (20 mg) by mouth daily    Coronary artery disease of native artery of native heart with stable angina pectoris (H)       clopidogrel 75 MG tablet    PLAVIX    90 tablet    Take 1 tablet (75 mg) by mouth daily    Coronary artery disease of native artery of native heart with stable angina pectoris (H)       docusate sodium 100 MG tablet    COLACE    60 tablet    Take 100 mg by mouth 2 times daily as needed for constipation    Other constipation       dorzolamide-timolol 2-0.5 % ophthalmic solution    COSOPT    1 Bottle    PLACE 1 DROP IN THE RIGHT EYE ONCE DAILY    Primary open angle glaucoma of right eye, mild stage       furosemide 20 MG tablet    LASIX    90 tablet    Take 1 tablet (20 mg) by mouth daily    Essential hypertension       HYDROCERIN Crea     180 g    Apply topically 3 times daily as needed    Dry skin       isosorbide mononitrate 30 MG 24 hr tablet    IMDUR    30 tablet    Take 1 tablet (30 mg) by mouth daily        lidocaine 5 % ointment    XYLOCAINE    30 g    Apply to affected area three times a day as needed    Chronic low back pain, unspecified back pain laterality, with sciatica presence unspecified       lisinopril 40 MG tablet    PRINIVIL/ZESTRIL    90 tablet    Take 1 tablet (40 mg) by mouth daily    Essential hypertension       metoprolol tartrate 100 MG tablet    LOPRESSOR    90 tablet    Take 0.5 tablets (50 mg) by mouth 2 times daily    Essential hypertension       mometasone-formoterol 200-5 MCG/ACT oral inhaler    DULERA    3 Inhaler    INHALE TWO PUFFS BY MOUTH TWICE A DAY    Chronic obstructive pulmonary disease, unspecified COPD type (H)       nitroGLYcerin 0.4 MG/SPRAY spray    NITROLINGUAL     DISSOLVE 1 SPRAY UNDER TONGUE EVERY 5 MINUTES AS NEEDED FOR CHEST PAIN        order for DME     1 Units    TENS unit    Chronic bilateral low back pain without sciatica        polyvinyl alcohol 1.4 % ophthalmic solution    LIQUIFILM TEARS    15 mL    Place 1 drop into the right eye as needed for dry eyes    Dry eyes       potassium chloride SA 10 MEQ CR tablet    K-DUR/KLOR-CON M    180 tablet    Take 2 tablets (20 mEq) by mouth daily    Hypokalemia       ranitidine 150 MG tablet    ZANTAC    90 tablet    Take 1 tablet (150 mg) by mouth daily    Gastroesophageal reflux disease, esophagitis presence not specified       ranolazine 500 MG 12 hr tablet    RANEXA     Take 1 tablet (500 mg) by mouth 2 times daily        sertraline 50 MG tablet    ZOLOFT    90 tablet    Take 1 tablet (50 mg) by mouth daily    Depression, unspecified depression type       sodium chloride 0.65 % nasal spray    OCEAN    3 Bottle    Spray in each nostril 3-4 times daily as needed    Preventive measure       TAB-A-MARZENA Tabs     90 tablet    Take 1 tablet by mouth daily    Routine adult health maintenance       tiotropium 18 MCG capsule    SPIRIVA    90 capsule    Inhale 1 capsule (18 mcg) into the lungs daily    Chronic obstructive pulmonary disease, unspecified COPD type (H)       traZODone 50 MG tablet    DESYREL    180 tablet    Take 1/2 to 2 tablets by mouth at bedtime as needed for sleep.    Insomnia, unspecified type       vitamin D 2000 UNITS tablet     100 tablet    Take 2,000 Units by mouth daily    Preventive measure

## 2022-05-27 NOTE — PROGRESS NOTES
End of Shift Note    Bedside shift change report given to [address] (oncoming nurse) by Tammi Betancourt RN (offgoing nurse). Report included the following information SBAR, ED Summary and MAR    Shift worked:  7p-7a     Shift summary and any significant changes:     Patient medicated for headahe and generalized pain. Patient stated she was having sharp pain behind right and blurred vision. NP notified and evaluated patient. NO received. Concerns for physician to address:  Patient HgB is trending down and patient does have concerns about this     Zone phone for oncoming shift:   [phone]       Activity:  Activity Level: Up with Assistance  Number times ambulated in hallways past shift: 1  Number of times OOB to chair past shift: 2    Cardiac:   Cardiac Monitoring: No      Cardiac Rhythm: Sinus Tachy    Access:   Current line(s): PIV     Genitourinary:   Urinary status: voiding    Respiratory:   O2 Device: None (Room air)  Chronic home O2 use?: NO  Incentive spirometer at bedside: NO       GI:  Last Bowel Movement Date: 05/26/22  Current diet:  ADULT ORAL NUTRITION SUPPLEMENT Lunch; Protein Modular  DIET ONE TIME MESSAGE  ADULT DIET Dysphagia - Pureed  Passing flatus: YES  Tolerating current diet: YES       Pain Management:   Patient states pain is manageable on current regimen: NO    Skin:  Quan Score: 20  Interventions: increase time out of bed    Patient Safety:  Fall Score:  Total Score: 3  Interventions: gripper socks  High Fall Risk: Yes    Length of Stay:  Expected LOS: 4d 14h  Actual LOS: [address], RN

## 2022-05-27 NOTE — PROGRESS NOTES
Hospitalist Progress Note    NAME: Calos Morse   :  1960   MRN:  [id number]     Admit date: 2022    Today's date: 22      Assessment / Plan:  Hypovolemic shock  resolved  Acute blood loss anemia   resolved  GI bleeding from Erosive esophagitis,   Iron deficiency  - Reportedly vomited blood PTA, hypotensive in ED  - S/p 2 L IVF in ED  - S/p 1 unit PRBCs   - EGD    Esophagus:             Lower 1/3 of esophagus with scarred appearing mucosa with some friability seen extending from 30-33 cm from incisors.              Slight oozing but no mucosal tear seen.  This appeared c/w partially healed LA Grade B erosive esophagitis.              No bx performed. Rickford Math was no other nodularity seen or mucosal lesions in esophagus.          There was a moderate sized hiatal hernia with GEJ located at 40 cm form incisors.    Stomach:               Normal mucosa throughout the stomach that appeared cylindrical shaped in keeping with prior Gastric sleeve.                No ulcers in stomach.  Pylorus patent. Duodenum:             Normal mucosa to second portion.     - Avoid NSAIDs  - Continue carafate with PPI  -She wants to be on puréed diet   -Received I.V iron x 1 dose. We will give another dose of IV iron today  - Due to history of gastric bypass surgery, oral absorption of iron is not reliable   -Follow-up with GI as OP     Deconditioned  DJD, knees  - was on NSAIDs for DJD. Try hydrocodone prn. Baseline walks sometimes by herself but frequently needs cane.   Does not drive.      - Requesting pain medication especially toradol     Abdominal pain  - Patient reported that she needs premedication for that reason  - LFTs obtained within normal limits  -  CT abdomen showed cholelithiasis without Choleycystitis  - Physical exam is benign  - Continue previous Protonix 40 mg twice daily     Bradycardia-resolved  Hypothyroidism  -TSH 0.10  -Home synthroid reduced from 175 mcg to 150 mcg due to slightly low TSH  -also on cytomel PTA. Needs repeat TSH in 4 weeks with PCP    COPD  -duoneb Q 6 hr  -budesonide neb Q 12     Schizoaffective disorder POA  Recent admit for clonopine/suboxone overdose in [address] [place] [address] several months ago  Chronic pain syndrome-palliative consulted appreciate note 5/25  No suboxone at d/c avoid narcotics  Ok for norco per palliative team  On neurontin   Continue psych meds  Was seen by psych but no new recommendations     Hx of lupus- continue Plaquenil follow-up with primary rheumatologist        Code status: Full  Prophylaxis: SCD's  Recommended Disposition:SNF placement pending. Patient has been discharged but she is appealing her discharge. After discussion with patient, she may be agreeable for discharge tomorrow      Subjective:     Patient was seen and examined. Chart was reviewed. Patient continues to complain of intermittent joint shooting pains. Request repeat TSH testing and also requesting Toradol    Objective:     VITALS:   Last 24hrs VS reviewed since prior progress note. Most recent are:  Patient Vitals for the past 24 hrs:   Temp Pulse Resp BP SpO2   05/27/22 0822 98.5 °F (36.9 °C) 62 16 (!) 122/54 99 %   05/26/22 2017 -- -- -- -- 95 %   05/26/22 1940 (!) 96.7 °F (35.9 °C) 80 16 119/69 97 %   05/26/22 1501 99 °F (37.2 °C) 70 17 120/60 95 %       Intake/Output Summary (Last 24 hours) at 5/27/2022 1440  Last data filed at 5/26/2022 1900  Gross per 24 hour   Intake --   Output 850 ml   Net -850 ml        Wt Readings from Last 12 Encounters:   05/17/22 56.4 kg (124 lb 5.4 oz)   05/16/22 55.7 kg (122 lb 12.8 oz)   05/10/22 59.9 kg (132 lb)       PHYSICAL EXAM:    I had a face to face encounter and independently examined this patient on 05/27/22 as outlined below:    General: WD WN.     EENT:  MMM  Resp:  No accessory muscle use  CV:  Regular  rhythm,  GI:  Soft, Non distended, Non tender  Neurologic:  No focal deficit  Psych:   Not anxious nor agitated  Skin:  No julia.      LABS:    Pertinent labs include:  Recent Labs     05/27/22  0303 05/26/22  0357 05/25/22  0212   WBC 5.9 5.7 5.7   HGB 8.9* 9.3* 9.2*   HCT 28.7* 30.2* 29.9*   * 471* 535*     Recent Labs     05/26/22  0357 05/25/22  0212    138   K 4.2 3.6    103   CO2 29 30   GLU 93 99   BUN 10 11   CREA 0.64 0.70   CA 8.9 8.8   MG  --  2.3   PHOS 4.0 3.6   ALB 2.9* 2.8*         Current Facility-Administered Medications:     naphazoline-pheniramine (NAPHCON A) 0.025-0.3 % ophthalmic solution drop 1 Drop, 1 Drop, Right Eye, TID PRN, Poly Mendoza NP    gabapentin (NEURONTIN) capsule 300 mg, 300 mg, Oral, TID, Bandar Reyna MD, 300 mg at 05/27/22 0950    HYDROcodone-acetaminophen (NORCO) 5-325 mg per tablet 1 Tablet, 1 Tablet, Oral, Q4H PRN, Jayashree Alicia NP, 1 Tablet at 05/27/22 1330    senna-docusate (PERICOLACE) 8.6-50 mg per tablet 1 Tablet, 1 Tablet, Oral, BID, Bandar Reyna MD, 1 Tablet at 05/27/22 0950    polyethylene glycol (MIRALAX) packet 17 g, 17 g, Oral, DAILY, Bandar Reyna MD, 17 g at 05/27/22 0950    magnesium hydroxide (MILK OF MAGNESIA) 400 mg/5 mL oral suspension 30 mL, 30 mL, Oral, DAILY PRN, Bandar Reyna MD    ferrous sulfate tablet 325 mg, 1 Tablet, Oral, DAILY WITH BREAKFAST, Lanny Brody MD, 325 mg at 05/26/22 5435    clonazePAM (KlonoPIN) tablet 0.5 mg, 0.5 mg, Oral, Q8H PRN, Suze Townsend MD, 0.5 mg at 05/27/22 3021    pantoprazole (PROTONIX) tablet 40 mg, 40 mg, Oral, ACB&D, Suze Townsend MD, 40 mg at 05/27/22 9063    butalbital-acetaminophen-caffeine (FIORICET, ESGIC) -40 mg per tablet 1 Tablet, 1 Tablet, Oral, Q6H PRN, Suze Townsend MD, 1 Tablet at 05/27/22 2698    levothyroxine (SYNTHROID) tablet 150 mcg, 150 mcg, Oral, 6am, Suze Townsend MD, 150 mcg at 05/27/22 0648    albuterol-ipratropium (DUO-NEB) 2.5 MG-0.5 MG/3 ML, 3 mL, Nebulization, Q6H PRN, Suze Townsend MD    liothyronine (CYTOMEL) tablet 5 mcg, 5 mcg, Oral, DAILY, Suze Townsend MD, 5 mcg at 05/27/22 0900    traZODone (DESYREL) tablet 25 mg, 25 mg, Oral, QHS PRN, Sandhya Jj MD, 25 mg at 05/24/22 0212    furosemide (LASIX) tablet 40 mg, 40 mg, Oral, DAILY, Sandhya Jj MD, 40 mg at 05/27/22 0950    hydrOXYchloroQUINE (PLAQUENIL) tablet 200 mg, 200 mg, Oral, DAILY, Sandhya Jj MD, 200 mg at 05/27/22 0950    nicotine (NICODERM CQ) 21 mg/24 hr patch 1 Patch, 1 Patch, TransDERmal, DAILY, Sarah Arrieta NP, 1 Patch at 05/27/22 0951    ondansetron (ZOFRAN) injection 4 mg, 4 mg, IntraVENous, Q6H, Yennifer Bills PA, 4 mg at 05/27/22 1330    sucralfate (CARAFATE) tablet 1 g, 1 g, Oral, AC&HS, Kamar Tyson MD, 1 g at 05/27/22 1330    FLUoxetine (PROzac) capsule 60 mg, 60 mg, Oral, DAILY, Farhat Goodwin MD, 60 mg at 05/27/22 0950    lidocaine 4 % patch 1 Patch, 1 Patch, TransDERmal, Q24H, Mirella Riggs MD, 1 Patch at 05/26/22 1837    budesonide (PULMICORT) 250 mcg/2ml nebulizer susp, 250 mcg, Nebulization, BID RT, Sarah Arrieta NP, 250 mcg at 05/26/22 2017    sodium chloride (NS) flush 5-40 mL, 5-40 mL, IntraVENous, Q8H, Juan José Apodaca NP, 10 mL at 05/27/22 1330    sodium chloride (NS) flush 5-40 mL, 5-40 mL, IntraVENous, PRN, Juan José Rodriguez NP    ondansetron (ZOFRAN ODT) tablet 4 mg, 4 mg, Oral, Q8H PRN **OR** ondansetron (ZOFRAN) injection 4 mg, 4 mg, IntraVENous, Q6H PRN, Sarah Arrieta NP, 4 mg at 05/25/22 1847    0.9% sodium chloride infusion 250 mL, 250 mL, IntraVENous, PRN, Sarah DAVIES, NP    Signed: Sherry Car MD

## 2022-05-28 VITALS
OXYGEN SATURATION: 96 % | BODY MASS INDEX: 22.03 KG/M2 | TEMPERATURE: 97.6 F | HEIGHT: 63 IN | WEIGHT: 124.34 LBS | SYSTOLIC BLOOD PRESSURE: 126 MMHG | DIASTOLIC BLOOD PRESSURE: 64 MMHG | RESPIRATION RATE: 12 BRPM | HEART RATE: 63 BPM

## 2022-05-28 LAB
ANION GAP SERPL CALC-SCNC: 7 MMOL/L (ref 5–15)
BUN SERPL-MCNC: 21 MG/DL (ref 6–20)
BUN/CREAT SERPL: 31 (ref 12–20)
CALCIUM SERPL-MCNC: 8.3 MG/DL (ref 8.5–10.1)
CHLORIDE SERPL-SCNC: 102 MMOL/L (ref 97–108)
CO2 SERPL-SCNC: 28 MMOL/L (ref 21–32)
CREAT SERPL-MCNC: 0.68 MG/DL (ref 0.55–1.02)
ERYTHROCYTE [DISTWIDTH] IN BLOOD BY AUTOMATED COUNT: 14.9 % (ref 11.5–14.5)
GLUCOSE SERPL-MCNC: 95 MG/DL (ref 65–100)
HCT VFR BLD AUTO: 27.9 % (ref 35–47)
HGB BLD-MCNC: 8.5 G/DL (ref 11.5–16)
MCH RBC QN AUTO: 27.6 PG (ref 26–34)
MCHC RBC AUTO-ENTMCNC: 30.5 G/DL (ref 30–36.5)
MCV RBC AUTO: 90.6 FL (ref 80–99)
NRBC # BLD: 0 K/UL (ref 0–0.01)
NRBC BLD-RTO: 0 PER 100 WBC
PLATELET # BLD AUTO: 416 K/UL (ref 150–400)
PMV BLD AUTO: 8.7 FL (ref 8.9–12.9)
POTASSIUM SERPL-SCNC: 3.9 MMOL/L (ref 3.5–5.1)
RBC # BLD AUTO: 3.08 M/UL (ref 3.8–5.2)
SODIUM SERPL-SCNC: 137 MMOL/L (ref 136–145)
WBC # BLD AUTO: 5.6 K/UL (ref 3.6–11)

## 2022-05-28 PROCEDURE — 74011250637 HC RX REV CODE- 250/637: Performed by: HOSPITALIST

## 2022-05-28 PROCEDURE — 74011250637 HC RX REV CODE- 250/637: Performed by: NURSE PRACTITIONER

## 2022-05-28 PROCEDURE — 85027 COMPLETE CBC AUTOMATED: CPT

## 2022-05-28 PROCEDURE — 80048 BASIC METABOLIC PNL TOTAL CA: CPT

## 2022-05-28 PROCEDURE — 74011250637 HC RX REV CODE- 250/637: Performed by: INTERNAL MEDICINE

## 2022-05-28 PROCEDURE — 74011000258 HC RX REV CODE- 258: Performed by: HOSPITALIST

## 2022-05-28 PROCEDURE — 74011250637 HC RX REV CODE- 250/637: Performed by: SPECIALIST

## 2022-05-28 PROCEDURE — 36415 COLL VENOUS BLD VENIPUNCTURE: CPT

## 2022-05-28 PROCEDURE — 74011250636 HC RX REV CODE- 250/636: Performed by: NURSE PRACTITIONER

## 2022-05-28 PROCEDURE — 74011250636 HC RX REV CODE- 250/636: Performed by: HOSPITALIST

## 2022-05-28 RX ADMIN — IRON SUCROSE 200 MG: 20 INJECTION, SOLUTION INTRAVENOUS at 11:06

## 2022-05-28 RX ADMIN — HYDROCODONE BITARTRATE AND ACETAMINOPHEN 1 TABLET: 5; 325 TABLET ORAL at 15:00

## 2022-05-28 RX ADMIN — FUROSEMIDE 40 MG: 40 TABLET ORAL at 09:21

## 2022-05-28 RX ADMIN — SUCRALFATE 1 G: 1 TABLET ORAL at 11:33

## 2022-05-28 RX ADMIN — SENNOSIDES AND DOCUSATE SODIUM 1 TABLET: 50; 8.6 TABLET ORAL at 09:23

## 2022-05-28 RX ADMIN — FLUOXETINE HYDROCHLORIDE 60 MG: 20 CAPSULE ORAL at 09:22

## 2022-05-28 RX ADMIN — ONDANSETRON 4 MG: 2 INJECTION INTRAMUSCULAR; INTRAVENOUS at 03:23

## 2022-05-28 RX ADMIN — BUTALBITAL, ACETAMINOPHEN, AND CAFFEINE 1 TABLET: 50; 325; 40 TABLET ORAL at 08:02

## 2022-05-28 RX ADMIN — GABAPENTIN 300 MG: 300 CAPSULE ORAL at 09:23

## 2022-05-28 RX ADMIN — SUCRALFATE 1 G: 1 TABLET ORAL at 08:13

## 2022-05-28 RX ADMIN — HYDROXYCHLOROQUINE SULFATE 200 MG: 200 TABLET ORAL at 10:26

## 2022-05-28 RX ADMIN — HYDROCODONE BITARTRATE AND ACETAMINOPHEN 1 TABLET: 5; 325 TABLET ORAL at 03:28

## 2022-05-28 RX ADMIN — CLONAZEPAM 0.5 MG: 0.5 TABLET ORAL at 03:48

## 2022-05-28 RX ADMIN — BUTALBITAL, ACETAMINOPHEN, AND CAFFEINE 1 TABLET: 50; 325; 40 TABLET ORAL at 14:26

## 2022-05-28 RX ADMIN — HYDROCODONE BITARTRATE AND ACETAMINOPHEN 1 TABLET: 5; 325 TABLET ORAL at 10:07

## 2022-05-28 RX ADMIN — ONDANSETRON 4 MG: 2 INJECTION INTRAMUSCULAR; INTRAVENOUS at 08:20

## 2022-05-28 RX ADMIN — LIOTHYRONINE SODIUM 5 MCG: 5 TABLET ORAL at 09:23

## 2022-05-28 RX ADMIN — PANTOPRAZOLE SODIUM 40 MG: 40 TABLET, DELAYED RELEASE ORAL at 08:04

## 2022-05-28 RX ADMIN — CLONAZEPAM 0.5 MG: 0.5 TABLET ORAL at 11:33

## 2022-05-28 RX ADMIN — LEVOTHYROXINE SODIUM 150 MCG: 0.15 TABLET ORAL at 07:57

## 2022-05-28 NOTE — PROGRESS NOTES
No further CM needs identified. CM notified pt's nurse of d/c. Transition of Care Plan:     RUR: 14% - \"low risk\"  Disposition: SNF - [address]   *Report: [phone]   *Fax: [phone]  Follow up appointments: PCP & specialist as indicated  DME needed: Defer to SNF for DME needs  Transportation at Discharge: [facility] transport secured for 11:00 AM; PCS completed, copy on chart  Keys or means to access home: N/A - pt transitioning to SNF at d/c       IM Medicare Letter: 2nd  provided 5/25/22  Is patient a BCPI-A Bundle: N/A          Is patient a [place] and connected with the [facility]? N/A               Caregiver Contact: Pt's  (Jose Elias Kingston: [phone])  Discharge Caregiver contacted prior to discharge? Per request  Care Conference needed?: N/A     Update - 9:55 AM: MD & CM completed meeting with pt at bedside. MD addressed each concern/inquiry pt had; pt agreeable to proceeding with the d/c plan. RN to contact CM when [facility] crew arrives; CM to provide emotional support to pt as needed. Initial note: CM received call from RN reporting pt is refusing to leave to SNF today & would like to appeal d/c for a second time. Pt's initial Medicare appeal was denied 5/27/22. CM met with pt in attempt to defuse situation/proceed with d/c plan. Pt reported she's been in contact with Medicare & was informed she can appeal the d/c a second time. Pt informed medical status has not changed since initial appeal request. Pt expressed concern regarding her RBC & Hgb levels; pt requested to speak with attending MD regarding concerns. Pt reported she doesn't want to d/c to SNF & be sent back to the E.D if her Hgb drops. CM provided active listening as needed. Pt continuing to refuse plan for d/c.     CM contacted CM Manager, Ann Amezquita to report update. CM Manager to issue [facility] letter if pt continues to refuse plan for d/c; CM Manager on stand-by to proceed with issuing [facility] letter as needed.      CM contacted attending MD to report pt's concerns. MD & CM to meet with pt at bedside to attempt to address concerns & avoid delaying d/c today to SNF. CM will report updates once available. Care Management Interventions  PCP Verified by CM:  Yes  Palliative Care Criteria Met (RRAT>21 & CHF Dx)?: Yes  Palliative Consult Recommended?: Yes  Mode of Transport at Discharge: [address]  [address] Time of Discharge: [facility] (CM Consult): SNF  Partner SNF: Yes  Discharge Durable Medical Equipment: No  Physical Therapy Consult: Yes  Occupational Therapy Consult: Yes  Speech Therapy Consult: No  Support Systems: Spouse/Significant Other  Confirm Follow Up Transport: Family  The Plan for Transition of Care is Related to the Following Treatment Goals : SNF  The Patient and/or Patient Representative was Provided with a Choice of Provider and Agrees with the Discharge Plan?: Yes  Name of the Patient Representative Who was Provided with a Choice of Provider and Agrees with the Discharge Plan: patient (Grazyna Inch)  [place] of Choice List was Provided with Basic Dialogue that Supports the Patient's Individualized Plan of Care/Goals, Treatment Preferences and Shares the Quality Data Associated with the Providers?: Yes   Resource Information Provided?: No  Discharge Location  Patient Expects to be Discharged to[de-identified] Skilled nursing facility ([address])    Mandi Mclaughlin, [address] [facility], [facility]  [phone]

## 2022-05-28 NOTE — PROGRESS NOTES
Report called to [facility] care nurse, Kassandra Dean RN. SBAR , Kardex and orders reviewed. PIV x 2 removed .  Patient agreeable to discharge today, approximate  time by [facility] is 2pm.

## 2022-05-28 NOTE — PROGRESS NOTES
End of Shift Note    Bedside shift change report given to Crystal Lamb (oncoming nurse) by Alexi Vann RN (offgoing nurse). Report included the following information SBAR, Kardex, Intake/Output, MAR and Recent Results    Shift worked:  7p-7a     Shift summary and any significant changes:     Patient received medication for pain--see [facility]    Patient requested AM labs; this RN messaged the on call hospitalist regarding these labs; AM labs ordered and collected. Concerns for physician to address:       Zone phone for oncoming shift:          Activity:  Activity Level: Up with Assistance  Number times ambulated in hallways past shift: 0  Number of times OOB to chair past shift: 0    Cardiac:   Cardiac Monitoring: No      Cardiac Rhythm: Sinus Tachy    Access:   Current line(s): PIV ; PICC  Genitourinary:   Urinary status: voiding    Respiratory:   O2 Device: None (Room air)    Incentive spirometer at bedside: YES       GI:  Last Bowel Movement Date: 05/26/22  Current diet:  ADULT ORAL NUTRITION SUPPLEMENT Lunch; Protein Modular  DIET ONE TIME MESSAGE  ADULT DIET Dysphagia - Pureed  DIET ONE TIME MESSAGE  Passing flatus: YES  Tolerating current diet: YES       Pain Management:   Patient states pain is manageable on current regimen: YES    Skin:  Quan Score: 20  Interventions: increase time out of bed    Patient Safety:  Fall Score:  Total Score: 3  Interventions: assistive device (walker, cane, etc), gripper socks and pt to call before getting OOB  High Fall Risk: Yes    Length of Stay:  Expected LOS: 4d 14h  Actual LOS: 12      Alexi Vann RN

## 2022-05-28 NOTE — DISCHARGE SUMMARY
Hospitalist Discharge Summary     Patient ID:  Camille Desir  [id number]  57 y.o.  1960 5/16/2022    PCP on record: Chelsea Patterson MD    Admit date: 5/16/2022  Discharge date and time: 5/28/2022    DISCHARGE DIAGNOSIS:  Hypovolemic shock  Acute blood loss anemia  GI bleed  Chronic pain syndrome  COPD  Schizoaffective disorder  Hypothyroidism  History of lupus  Arthritic knee pains    CONSULTATIONS:  IP CONSULT TO GASTROENTEROLOGY  IP CONSULT TO PSYCHOLOGY  IP CONSULT TO PALLIATIVE CARE - PROVIDER  IP CONSULT TO PAIN MANAGEMENT    Excerpted HPI from H&P of Bill Davila MD:   63 y/o F pt with PMH of COPD, iron deficiency anemia, peptic ulcer disease, lupus, sarcoidosis presented to ED with c/o SOB, N/V/abdominal pain. Pt reports vomiting blood yesterday but no hematemesis today.       Upon arrival to ED, pt hypotensive to 63/40, given 2 L IVF with improvement in BP. Labs significant for Hg of 8.3 (down from 10.3 on 5/10). GI consulted and recommended protonix and will see in am unless she becomes unstable with s/o active bleeding. ICU consulted for admission.     Upon my arrival pt's BP 92/60, HR 50.  C/o being cold and leg and abdominal pain. She is alert and oriented but with a disorganized speech pattern. Will be admitted to the ICU for further management.       ______________________________________________________________________  DISCHARGE SUMMARY/HOSPITAL COURSE:  for full details see H&P, daily progress notes, labs, consult notes.    Hypovolemic shock  resolved  Acute blood loss anemia   resolved  GI bleeding from Erosive esophagitis,   Iron deficiency  - Reportedly vomited blood PTA, hypotensive in ED  - S/p 2 L IVF in ED  - S/p 1 unit PRBCs 5/17  - EGD 5/18   Esophagus:             Lower 1/3 of esophagus with scarred appearing mucosa with some friability seen extending from 30-33 cm from incisors.              Slight oozing but no mucosal tear seen.  This appeared c/w partially healed LA Grade B erosive esophagitis.              No bx performed. Raya Conner was no other nodularity seen or mucosal lesions in esophagus.          There was a moderate sized hiatal hernia with GEJ located at 40 cm form incisors.    Stomach:               Normal mucosa throughout the stomach that appeared cylindrical shaped in keeping with prior Gastric sleeve.                No ulcers in stomach.  Pylorus patent. Duodenum:             Normal mucosa to second portion.     - Avoid NSAIDs  - Continue carafate with PPI  -She wants to be on puréed diet   - Iron sucrose 200mg IV x 1 (5/19)  - On  p.o. iron supplement given iron studies compatible with iron deficiency anemia  -Follow-up with GI as OP        Deconditioned  DJD, knees  - was on NSAIDs for DJD. Try hydrocodone prn. Baseline walks sometimes by herself but frequently needs cane. Does not drive.      - PT OT eval and treat. DC planning to SNF     Abdominal pain  - Patient reported that she needs premedication for that reason  - LFTs obtained within normal limits  -  CT abdomen showed cholelithiasis without Choleycystitis  - Physical exam is benign  - Continue previous Protonix 40 mg twice daily     Bradycardia-resolved  Hypothyroidism  -TSH 0.10  -Home synthroid reduced from 175 mcg to 150 mcg  -also on cytomel PTA. Needs repeat TSH in 4 weeks with PCP    COPD  -duoneb Q 6 hr  -budesonide neb Q 12     Schizoaffective disorder POA  Recent admit for clonopine/suboxone overdose in [place] several months ago  Chronic pain syndrome-palliative consulted appreciate note 5/25  No suboxone at d/c avoid narcotics  Ok for norco per palliative team  On neurontin   Continue psych meds  Was seen by psych but no new recommendations     Hx of lupus- continue Plaquenil follow-up with primary rheumatologist     Pt agreed to go to SNF today and will follow up with GI and Hematology on out pt basis. _______________________________________________________________________  Patient seen and examined by me on discharge day . Patient maximized inpatient benefit stay and cleared to discharge to skilled nursing facility  bed is available but she had appealed her discharge pending status at this time. Patient seen by palliative pain team before discharge recommending continuing Cecil Lowe as needed and follow-up with her pain specialist in [place]. No other acute issues medically stable for discharge at this time. _______________________________________________________________________  DISCHARGE MEDICATIONS:   Current Discharge Medication List      START taking these medications    Details   albuterol-ipratropium (DUO-NEB) 2.5 mg-0.5 mg/3 ml nebu 3 mL by Nebulization route every six (6) hours as needed for Wheezing. Qty: 30 Nebule, Refills: 0  Start date: 5/25/2022      budesonide (PULMICORT) 0.25 mg/2 mL nbsp 2 mL by Nebulization route two (2) times a day. Qty: 1 Each, Refills: 0  Start date: 5/25/2022      ferrous sulfate 325 mg (65 mg iron) tablet Take 1 Tablet by mouth daily (with breakfast). Qty: 30 Tablet, Refills: 0  Start date: 5/26/2022      gabapentin (NEURONTIN) 300 mg capsule Take 1 Capsule by mouth three (3) times daily. Max Daily Amount: 900 mg. Qty: 15 Capsule, Refills: 0  Start date: 5/25/2022    Associated Diagnoses: Chronic pain of both knees      HYDROcodone-acetaminophen (NORCO) 5-325 mg per tablet Take 1 Tablet by mouth every four (4) hours as needed for Pain for up to 3 days. Max Daily Amount: 6 Tablets. Qty: 10 Tablet, Refills: 0  Start date: 5/25/2022, End date: 5/28/2022    Associated Diagnoses: Chronic pain of both knees      lidocaine 4 % patch As directed  Qty: 1 Each, Refills: 0  Start date: 5/25/2022      pantoprazole (PROTONIX) 40 mg tablet Take 1 Tablet by mouth Before breakfast and dinner.   Qty: 30 Tablet, Refills: 0  Start date: 5/25/2022      polyethylene glycol (MIRALAX) 17 gram packet Take 1 Packet by mouth daily. Qty: 1 Packet, Refills: 0  Start date: 5/26/2022      senna-docusate (PERICOLACE) 8.6-50 mg per tablet Take 1 Tablet by mouth two (2) times a day. Qty: 10 Tablet, Refills: 0  Start date: 5/25/2022      traZODone (DESYREL) 50 mg tablet Take 0.5 Tablets by mouth nightly as needed for Sleep. Qty: 10 Tablet, Refills: 0  Start date: 5/25/2022         CONTINUE these medications which have CHANGED    Details   butalbital-aspirin-caffeine (FIORINAL) -40 mg tablet Take 1 Tablet by mouth two (2) times daily as needed for Headache. Max Daily Amount: 2 Tablets. Qty: 10 Tablet, Refills: 0  Start date: 5/25/2022    Associated Diagnoses: Chronic pain of both knees      clonazePAM (KlonoPIN) 0.5 mg tablet Take 1 Tablet by mouth every eight (8) hours as needed for Anxiety. Max Daily Amount: 1.5 mg.  Qty: 10 Tablet, Refills: 0  Start date: 5/25/2022    Associated Diagnoses: Chronic pain of both knees      levothyroxine (SYNTHROID) 150 mcg tablet Take 1 Tablet by mouth every morning. Qty: 10 Tablet, Refills: 0  Start date: 5/26/2022         CONTINUE these medications which have NOT CHANGED    Details   sucralfate (CARAFATE) 1 gram tablet Take 1 g by mouth Before breakfast and dinner. azaTHIOprine (IMURAN) 50 mg tablet Take 50 mg by mouth daily. vortioxetine (Trintellix) 10 mg tablet Take 10 mg by mouth daily. FLUoxetine (PROzac) 20 mg capsule Take 60 mg by mouth daily. rosuvastatin (CRESTOR) 20 mg tablet Take 20 mg by mouth daily. hydrOXYchloroQUINE (PLAQUENIL) 200 mg tablet Take 200 mg by mouth daily. tiZANidine (ZANAFLEX) 4 mg tablet Take 4 mg by mouth nightly. liothyronine (CYTOMEL) 5 mcg tablet Take 5 mcg by mouth daily. promethazine (PHENERGAN) 6.25 mg/5 mL syrup Take 12.5 mg by mouth two (2) times daily as needed for Nausea.          STOP taking these medications       furosemide (LASIX) 40 mg tablet Comments:   Reason for Stopping:                 Patient Follow Up Instructions:    Diet-.   As per patient request  Activity slowly increase as tolerated to levels before  Check labs CBC/BMP Monday next week  Return to ER or call MD immediately if symptoms recur or get worse  Strict fall/aspiration/pressure ulcer precautions  Avoid NSAIDs    Follow-up Information     Follow up With Specialties Details Why [address] [facility] [address] Trivedi [address]  [phone]    Primary rheumatologist in 2 weeks        PCP after discharge from rehab in 1 to 2 weeks        Ben Daniel MD Gastroenterology In 3 weeks for GI issues [address] [id number]  [phone]      Primary psychiatrist in 3 to 4 weeks        Pain management clinic in 4 to 8 weeks        Other, MD Chelsea    Patient can only remember the practice name and not the physician          ________________________________________________________________    Risk of deterioration: Moderate due to multiple comorbidities    Condition at Discharge:  Stable  __________________________________________________________________    Disposition  SNF/LTC    ____________________________________________________________________    Code Status: Full Code  ___________________________________________________________________      Total time in minutes spent coordinating this discharge  35  minutes    Signed:  Bridget Alegria MD .

## 2022-05-28 NOTE — PROGRESS NOTES
End of Shift Note    Bedside shift change report given to Crystal Bowie (oncoming nurse) by Mani Howe RN (offgoing nurse). Report included the following information SBAR, Kardex, Intake/Output, MAR and Recent Results    Shift worked:  7p-7a     Shift summary and any significant changes:     Patient received medication for pain--see [facility]    Patient requested AM labs; this RN messaged the on call hospitalist regarding these labs; AM labs ordered and collected. Concerns for physician to address:       Zone phone for oncoming shift:          Activity:  Activity Level: Up with Assistance  Number times ambulated in hallways past shift: 0  Number of times OOB to chair past shift: 0    Cardiac:   Cardiac Monitoring: No      Cardiac Rhythm: Sinus Tachy    Access:   Current line(s): PIV ; PICC  Genitourinary:   Urinary status: voiding    Respiratory:   O2 Device: None (Room air)    Incentive spirometer at bedside: YES       GI:  Last Bowel Movement Date: 05/26/22  Current diet:  ADULT ORAL NUTRITION SUPPLEMENT Lunch; Protein Modular  DIET ONE TIME MESSAGE  ADULT DIET Dysphagia - Pureed  DIET ONE TIME MESSAGE  Passing flatus: YES  Tolerating current diet: YES       Pain Management:   Patient states pain is manageable on current regimen: YES    Skin:  Quan Score: 20  Interventions: increase time out of bed    Patient Safety:  Fall Score:  Total Score: 3  Interventions: assistive device (walker, cane, etc), gripper socks and pt to call before getting OOB  High Fall Risk: Yes    Length of Stay:  Expected LOS: 4d 14h  Actual LOS: 12      Mani Howe RN

## 2022-05-28 NOTE — PROGRESS NOTES
[facility] called to update transportation status. They will be arriving to p/u this patient at D/C around 1215. If they are able to come earlier they will call back. Primary nurse - Keisha Sepulveda, notified by this writer.

## 2023-07-22 NOTE — ED NOTES
.Patient is being transferred to [facility] Critical [facility] 1, Room # 3412 [id number]. Report given to PANFILO Long on [employer] for routine progression of care. Report consisted of the following information SBAR, ED Summary, Intake/Output, MAR, Recent Results and Cardiac Rhythm SB to SR. Patient transferred to receiving unit by: Lionel Juarez (RN or tech name). Outstanding consults needed: Yes, as ordered    Next labs due: Yes, as ordered    The following personal items will be sent with the patient during transfer to the floor: All valuables:    Cardiac monitoring ordered: Yes, as ordered    The following CURRENT information was reported to the receiving RN:    Code status: Full Code at time of transfer    Last set of vital signs:  Vital Signs  Level of Consciousness: Alert (0) (05/16/22 1652)  Temp: 98.1 °F (36.7 °C) (05/16/22 1652)  Temp Source: Oral (05/16/22 1652)  Pulse (Heart Rate): (!) 51 (05/16/22 2237)  Cardiac Rhythm: Sinus Jermain (05/16/22 2300)  Resp Rate: 21 (05/16/22 2237)  BP: 92/60 (05/16/22 2237)  MAP (Monitor): 71 (05/16/22 2237)  MAP (Calculated): 71 (05/16/22 2237)  BP 1 Method: Automatic (05/16/22 1652)  MEWS Score: 4 (05/16/22 1652)         Oxygen Therapy  O2 Sat (%): 100 % (05/16/22 2300)  Pulse via Oximetry: 51 beats per minute (05/16/22 2237)  O2 Device: None (Room air) (05/16/22 2300)      Last pain assessment:  Pain 1  Pain Scale 1: Numeric (0 - 10)      Wounds: No     Urinary catheter: external catheter  Is there a [place] order: No     LDAs:       Peripheral IV 05/16/22 Right Antecubital (Active)       Peripheral IV 05/16/22 Left;Posterior Forearm (Active)         Opportunity for questions and clarification was provided. Kianna Greco RN        .
Patient to room, now assuming care
Pt is a 75y F with a pmh of HTN. Pt reports foot pain and states her feet felt like they are burning. Symptoms have resolved.

## 2025-01-24 NOTE — PROGRESS NOTES
SHIFT REPORT    0700: Bedside shift change report given to 26 Hetal Jacobo (oncoming nurse) by Mercedes WHITTAKER (offgoing nurse). Report included the following information SBAR, Kardex, Intake/Output, MAR, Recent Results and Cardiac Rhythm NSR. Resting quietly in bed  AOx4  PIV x 1  Sitter present     7383: Transfer report given to Surg Tele. See note for details. [de-identified] : FEVER AND COUGHING4 DAY HX OF FEVER , NASAL CONGESTION, COUGH

## 2025-05-08 NOTE — PROGRESS NOTES
Patient is ready for d/c from CM standpoint    Transition of Care Plan:     RUR:12%  Disposition: SNF-[facility] w/possible LTC  Follow up appointments:  DME needed:n/a-pt owns a walker  Transportation at [email or web address] Sat 5/28  [place] or means to access home:        IM Medicare Letter:2nd IM provided on 5/25  Is patient a BCPI-A Bundle:                      If yes, was Bundle Letter given?:    Is patient a  and connected with the [place]?                If yes, was [place] transfer form completed and [place] notified? Caregiver Contact:, Deanna Weinberg [phone]  Discharge Caregiver contacted prior to discharge? Care Conference needed?:      Effie Oliveira w/[facility] verified SNF is ready for pt tomorrow. RN to call report to [phone]    Patients  is in agreement w/d/c plan to SNF. CM provided  w/number to SNF. CM left  a vm, requesting a return call to discuss d/c plan.       Patients d/c appeal has been denied. CM informed pt, transport to SNF has been scheduled for 11am tomorrow. Per pt's request, CM will phone pt's  w/d/c plan.     Care Management Interventions  PCP Verified by CM: No  Mode of Transport at Discharge: BLS  Transition of Care Consult (CM Consult): SNF  Partner SNF: Yes  Discharge Durable Medical Equipment: No (pt uses a walker & has home O2 when needed)  Physical Therapy Consult: No  Occupational Therapy Consult: No  Support Systems: Spouse/Significant Other,Other Family Member(s)  Confirm Follow Up Transport: Family  The Patient and/or Patient Representative was Provided with a Choice of Provider and Agrees with the Discharge Plan?: Yes  Name of the Patient Representative Who was Provided with a Choice of Provider and Agrees with the Discharge Plan: patient  Freedom of Choice List was Provided with Basic Dialogue that Supports the Patient's Individualized Plan of Care/Goals, Treatment Preferences and Shares the Quality Data Associated with Pulmonary / Critical Care Progress Note      Patient Name: Nancie Grissom  : 1942  MRN: [id number]  Primary Care Physician:  Wen Leblanc MD  Date of admission: 2025    Subjective   Subjective   Follow-up for left pleural effusion, left-sided infiltrate and enlarged mediastinal lymph node.    Over past 24 hours: Patient had thoracentesis as well as bronchoscopy.  Remained on antibiotics.  Continued to have cough.  Started on Tussionex.    No acute events overnight.     This morning,   Feels slightly better.  Tussionex helping.  Still has cough, not able to clear secretions  Has no nausea or vomiting  No fever or chills.  No significant chest pain or chest tightness.      Review of Systems  General:  Fatigue, No Fever  HEENT: No dysphagia, No Visual Changes, no rhinorrhea  Respiratory:  + Dry cough,+Dyspnea, No Pleuritic Pain, + wheezing, no hemoptysis  Cardiovascular: Denies chest pain, denies palpitations,+CASTRO, No Chest Pressure  Gastrointestinal:  No Abdominal Pain, No Nausea, No Vomiting, No Diarrhea  Genitourinary:  No Dysuria, No Frequency, No Hesitancy  Musculoskeletal: No muscle pain or swelling  Endocrine:  No Heat Intolerance, No Cold Intolerance  Hematologic:  No Bleeding, No Bruising  Psychiatric:  No Anxiety, No Depression  Neurologic:  No Confusion, no Dysarthria, No Headaches  Skin:  No Rash, No Open Wounds          Objective   Objective     Vitals:   Temp:  [97.5 °F (36.4 °C)-98.8 °F (37.1 °C)] 98.6 °F (37 °C)  Heart Rate:  [] 94  Resp:  [10-20] 16  BP: ()/() 118/74  Flow (L/min) (Oxygen Therapy):  [2-4] 2  Physical Exam   Vital Signs Reviewed   General:  WDWN, Alert, in no acute distress, has conversational dyspnea  HEENT:  PERRL, EOMI.  OP, nares clear, no sinus tenderness  Neck:  Supple, no JVD, no thyromegaly  Chest:  good aeration, clear to auscultation bilaterally, tympanic to percussion bilaterally, no work of breathing noted  CV: RRR, no MGR, pulses 2+,  equal.  Abd:  Soft, NT, ND, + BS, no HSM  EXT:  no clubbing, no cyanosis, no edema  Neuro:  A&Ox3, CN grossly intact, no focal deficits.  Skin: No rashes or lesions noted      Result Review    Result Review:  I have personally reviewed the results from the time of this admission to 5/8/2025 07:39 EDT and agree with these findings:  [x]  Laboratory  [x]  Microbiology  [x]  Radiology  [x]  EKG/Telemetry   [x]  Cardiology/Vascular   []  Pathology  []  Old records  []  Other:  Most notable findings include:         Lab 05/08/25  0534 05/07/25  0537 05/06/25  1627   WBC 7.94 7.99 11.21*   HEMOGLOBIN 11.7* 12.5 14.0   HEMATOCRIT 35.2 37.5 41.6   PLATELETS 217 225 309   SODIUM 136 138 137   POTASSIUM 3.8 3.9 4.2   CHLORIDE 102 103 101   CO2 24.5 23.9 24.4   BUN 13 16 17   CREATININE 0.77 0.88 1.08*   GLUCOSE 87 91 106*   CALCIUM 8.3* 8.4* 8.9   TOTAL PROTEIN  --   --  7.0   ALBUMIN  --   --  3.8   GLOBULIN  --   --  3.2       XR Chest 1 View  Result Date: 5/7/2025  XR CHEST 1 VW Date of Exam: 5/7/2025 12:22 PM EDT Indication: Thoracentesis Comparison: 5/6/2025 Findings: Cardiac size is stable. The right lung is clear. There is a large hiatal hernia. There is an increase in infiltrate in the left lung. There is no pneumothorax following left thoracentesis. There is no definite residual left pleural fluid.     Impression: Impression: 1.No pneumothorax following left thoracentesis. 2.Increase in left lung infiltrate. 3.Large hiatal hernia. Electronically Signed: Hamlet Barker MD  5/7/2025 12:58 PM EDT  Workstation ID: [id number]    XR Chest 1 View  Result Date: 5/6/2025  XR CHEST 1 VW Date of Exam: 5/6/2025 4:43 PM EDT Indication: Weak/Dizzy/AMS triage protocol Comparison: Chest x-ray 4/23/2025 Findings: There is persistent airspace opacity in the left lung. There is volume loss. Given the long-term persistence CT chest is recommended to evaluate for obstructing lesion or neoplasm. Right lung is clear.     Impression:  the Providers?: Yes  Discharge Location  Patient Expects to be Discharged to[de-identified] Skilled nursing facility ([address])      [facility]  Ext [phone] Impression: Diffuse airspace opacity in the left lung. This is slightly progressed from previous exam. CT chest is recommended. Persistent pneumonia versus neoplastic process. Electronically Signed: Alexus Dillard MD  5/6/2025 4:54 PM EDT  Workstation ID: [id number]      CT Chest With Contrast Diagnostic  Result Date: 5/6/2025  CT CHEST W CONTRAST DIAGNOSTIC Date of Exam: 5/6/2025 8:32 PM EDT Indication: Persistent left lower lobe infiltrate for 5 weeks shortness of breath. Comparison: Chest x-ray 5/6/2025, 4/23/2025 Technique: Axial CT images were obtained of the chest after the uneventful intravenous administration of iodinated contrast.  Reconstructed coronal and sagittal images were also obtained. Automated exposure control and iterative construction methods were  used. Findings: Hilum and Mediastinum: There is a 11 mm short axis prevascular lymph node on image #90. Subcarinal lymph node measures 10 mm in short axis. Mild coronary artery atherosclerotic calcification. Heart size normal. No pericardial effusion. Large hernia with stomach above the diaphragm. No pulmonary artery emboli on this exam. Lung Parenchyma and Pleura: There is consolidation in the left upper lobe with masslike appearance and septal thickening. There is complete opacification of the lingula. There is narrowing and nonopacification of the lingula airways. There is diffuse bronchial wall thickening. There is septal thickening and small centrilobular nodules in the left lower lobe. There is a moderate size left pleural effusion. The right lung is clear. Upper Abdomen: Enhancing mass at the right posterior hepatic lobe on image 3 and 81 measures 11 mm. Soft tissues: There is a round mass in the lateral left breast. It measures 1.5 x 1.5 cm it is in the upper outer aspect, reference image 76. Osseous structures: Osteopenia. Small sclerotic foci at the superior T10 and superior T11 vertebral body. The T10 sclerotic focus measures about 5 mm. The T11  measures about 4 mm.     Impression: 1.There is masslike consolidation in the left upper lobe and lingula with septal thickening and centrilobular nodules in the left lower lobe. There is a moderate size left pleural effusion. Findings are suspicious for pneumonia. Underlying malignancy cannot be excluded. Recommend bronchoscopy directed towards the left upper lobe or lingula for evaluation. 2.There is a mass in the upper outer left breast measuring up to 1.5 cm. Diagnostic mammogram and ultrasound is recommended. 3.There is an enhancing mass in the right posterior hepatic lobe measuring 11 mm. This could be seen with a flash filling hemangioma. Metastatic disease cannot be excluded. 4.There are small sclerotic foci in the T10 and T11 vertebral bodies. These could be seen with bone islands. Metastatic disease cannot be excluded. 5.Large hernia with stomach above the diaphragm. 6.Mildly enlarged mediastinal lymph nodes could be reactive or metastatic. 7.PET/CT may be helpful for better evaluation. Electronically Signed: Alexus Dillard MD  5/6/2025 8:46 PM EDT  Workstation ID: [id number]        Assessment & Plan   Assessment / Plan     Active Hospital Problems:  Active Hospital Problems    Diagnosis     **Pneumonia          Impression:   Concern for pneumonia from unknown organism  Left pleural effusion  Recent pneumonia, failed Augmentin  Hepatic lobe mass  Anastomosis versus malignancy of T10/T11  Hiatal hernia  Enlarged mediastinal lymph nodes, reactive versus metastatic    Plan:   - Continue to maintain SpO2 greater than 90%  - CT personally reviewed demonstrating left pleural effusion and consolidation of the left upper lobe  - Status post thoracentesis and bronchoscopy.  -Cultures and pathology pending.  - Pneumonia panel negative.  Can discontinue antibiotics.  Would restart prednisone and taper slowly over a month.  Consider PET scan as an outpatient given enlarged mediastinal lymph nodes and hepatic lobe mass as  well as T10/11 abnormality.  - Encourage activity as tolerated and incentive spirometer use     VTE Prophylaxis:  Mechanical VTE prophylaxis orders are present.    CODE STATUS:   Code Status (Patient has no pulse and is not breathing): CPR (Attempt to Resuscitate)  Medical Interventions (Patient has pulse or is breathing): Full Support    I personally reviewed pertinent labs, imaging and provider notes. Discussed with bedside nurse and will discuss with primary service.     Electronically signed by Raleigh Tompkins MD, 5/8/2025, 07:39 EDT.

## (undated) DEVICE — HYPODERMIC SAFETY NEEDLE: Brand: MAGELLAN

## (undated) DEVICE — BLOCK BITE ENDOSCP AD 21 MM W/ DIL BLU LF DISP

## (undated) DEVICE — Device

## (undated) DEVICE — YANKAUER,TAPERED BULBOUS TIP,W/O VENT: Brand: MEDLINE

## (undated) DEVICE — 1200 GUARD II KIT W/5MM TUBE W/O VAC TUBE: Brand: GUARDIAN

## (undated) DEVICE — SET ADMIN 16ML TBNG L100IN 2 Y INJ SITE IV PIGGY BK DISP

## (undated) DEVICE — SYR 10ML LUER LOK 1/5ML GRAD --

## (undated) DEVICE — SYR 3ML LL TIP 1/10ML GRAD --

## (undated) DEVICE — ELECTRODE,RADIOTRANSLUCENT,FOAM,5PK: Brand: MEDLINE

## (undated) DEVICE — TOWEL 4 PLY TISS 19X30 SUE WHT

## (undated) DEVICE — SOLIDIFIER FLD 2OZ 1500CC N DISINF IN BTL DISP SAFESORB

## (undated) DEVICE — CATH IV AUTOGRD BC PNK 20GA 25 -- INSYTE

## (undated) DEVICE — BASIN EMSIS 16OZ GRAPHITE PLAS KID SHP MOLD GRAD FOR ORAL

## (undated) DEVICE — Z DISCONTINUED PER MEDLINE LINE GAS SAMPLING O2/CO2 LNG AD 13 FT NSL W/ TBNG FILTERLINE

## (undated) DEVICE — NEONATAL-ADULT SPO2 SENSOR: Brand: NELLCOR